# Patient Record
Sex: MALE | Race: WHITE | Employment: OTHER | URBAN - METROPOLITAN AREA
[De-identification: names, ages, dates, MRNs, and addresses within clinical notes are randomized per-mention and may not be internally consistent; named-entity substitution may affect disease eponyms.]

---

## 2017-01-01 ENCOUNTER — OFFICE VISIT (OUTPATIENT)
Dept: FAMILY MEDICINE | Facility: CLINIC | Age: 82
End: 2017-01-01
Payer: MEDICARE

## 2017-01-01 ENCOUNTER — DOCUMENTATION ONLY (OUTPATIENT)
Dept: FAMILY MEDICINE | Facility: CLINIC | Age: 82
End: 2017-01-01

## 2017-01-01 ENCOUNTER — TELEPHONE (OUTPATIENT)
Dept: FAMILY MEDICINE | Facility: CLINIC | Age: 82
End: 2017-01-01

## 2017-01-01 ENCOUNTER — NURSING HOME VISIT (OUTPATIENT)
Dept: GERIATRICS | Facility: CLINIC | Age: 82
End: 2017-01-01

## 2017-01-01 ENCOUNTER — APPOINTMENT (OUTPATIENT)
Dept: GENERAL RADIOLOGY | Facility: CLINIC | Age: 82
End: 2017-01-01
Attending: EMERGENCY MEDICINE
Payer: MEDICARE

## 2017-01-01 ENCOUNTER — DOCUMENTATION ONLY (OUTPATIENT)
Dept: CARE COORDINATION | Facility: CLINIC | Age: 82
End: 2017-01-01

## 2017-01-01 ENCOUNTER — OFFICE VISIT (OUTPATIENT)
Dept: BEHAVIORAL HEALTH | Facility: CLINIC | Age: 82
End: 2017-01-01
Payer: MEDICARE

## 2017-01-01 ENCOUNTER — TRANSFERRED RECORDS (OUTPATIENT)
Dept: HEALTH INFORMATION MANAGEMENT | Facility: CLINIC | Age: 82
End: 2017-01-01

## 2017-01-01 ENCOUNTER — OFFICE VISIT (OUTPATIENT)
Dept: PHARMACY | Facility: CLINIC | Age: 82
End: 2017-01-01
Payer: COMMERCIAL

## 2017-01-01 ENCOUNTER — OFFICE VISIT (OUTPATIENT)
Dept: BEHAVIORAL HEALTH | Facility: CLINIC | Age: 82
End: 2017-01-01
Payer: COMMERCIAL

## 2017-01-01 ENCOUNTER — OFFICE VISIT (OUTPATIENT)
Dept: FAMILY MEDICINE | Facility: CLINIC | Age: 82
End: 2017-01-01
Payer: COMMERCIAL

## 2017-01-01 ENCOUNTER — HOSPITAL LABORATORY (OUTPATIENT)
Dept: OTHER | Facility: CLINIC | Age: 82
End: 2017-01-01

## 2017-01-01 ENCOUNTER — MEDICAL CORRESPONDENCE (OUTPATIENT)
Dept: HEALTH INFORMATION MANAGEMENT | Facility: CLINIC | Age: 82
End: 2017-01-01

## 2017-01-01 ENCOUNTER — APPOINTMENT (OUTPATIENT)
Dept: GENERAL RADIOLOGY | Facility: CLINIC | Age: 82
End: 2017-01-01
Attending: PHYSICIAN ASSISTANT
Payer: MEDICARE

## 2017-01-01 ENCOUNTER — HOSPITAL ENCOUNTER (EMERGENCY)
Facility: CLINIC | Age: 82
Discharge: HOME | End: 2017-05-11
Attending: EMERGENCY MEDICINE | Admitting: EMERGENCY MEDICINE
Payer: MEDICARE

## 2017-01-01 ENCOUNTER — CARE COORDINATION (OUTPATIENT)
Dept: CARE COORDINATION | Facility: CLINIC | Age: 82
End: 2017-01-01

## 2017-01-01 ENCOUNTER — OFFICE VISIT (OUTPATIENT)
Dept: FAMILY MEDICINE | Facility: CLINIC | Age: 82
End: 2017-01-01

## 2017-01-01 ENCOUNTER — APPOINTMENT (OUTPATIENT)
Dept: PHYSICAL THERAPY | Facility: CLINIC | Age: 82
End: 2017-01-01
Attending: PHYSICIAN ASSISTANT
Payer: MEDICARE

## 2017-01-01 ENCOUNTER — DOCUMENTATION ONLY (OUTPATIENT)
Dept: OTHER | Facility: CLINIC | Age: 82
End: 2017-01-01

## 2017-01-01 ENCOUNTER — APPOINTMENT (OUTPATIENT)
Dept: MRI IMAGING | Facility: CLINIC | Age: 82
End: 2017-01-01
Attending: EMERGENCY MEDICINE
Payer: MEDICARE

## 2017-01-01 ENCOUNTER — DISCHARGE SUMMARY NURSING HOME (OUTPATIENT)
Dept: GERIATRICS | Facility: CLINIC | Age: 82
End: 2017-01-01
Payer: MEDICARE

## 2017-01-01 ENCOUNTER — NURSING HOME VISIT (OUTPATIENT)
Dept: GERIATRICS | Facility: CLINIC | Age: 82
End: 2017-01-01
Payer: MEDICARE

## 2017-01-01 ENCOUNTER — HOSPITAL ENCOUNTER (OUTPATIENT)
Facility: CLINIC | Age: 82
Setting detail: OBSERVATION
Discharge: HOME | End: 2017-05-04
Attending: EMERGENCY MEDICINE | Admitting: HOSPITALIST
Payer: MEDICARE

## 2017-01-01 ENCOUNTER — APPOINTMENT (OUTPATIENT)
Dept: CT IMAGING | Facility: CLINIC | Age: 82
End: 2017-01-01
Attending: EMERGENCY MEDICINE
Payer: MEDICARE

## 2017-01-01 VITALS — HEART RATE: 82 BPM | OXYGEN SATURATION: 98 %

## 2017-01-01 VITALS
RESPIRATION RATE: 20 BRPM | HEART RATE: 80 BPM | OXYGEN SATURATION: 94 % | SYSTOLIC BLOOD PRESSURE: 122 MMHG | DIASTOLIC BLOOD PRESSURE: 65 MMHG

## 2017-01-01 VITALS
RESPIRATION RATE: 21 BRPM | HEIGHT: 65 IN | SYSTOLIC BLOOD PRESSURE: 133 MMHG | HEART RATE: 97 BPM | OXYGEN SATURATION: 95 % | BODY MASS INDEX: 38.32 KG/M2 | DIASTOLIC BLOOD PRESSURE: 75 MMHG | WEIGHT: 230 LBS | TEMPERATURE: 98.1 F

## 2017-01-01 VITALS
WEIGHT: 213.4 LBS | RESPIRATION RATE: 20 BRPM | OXYGEN SATURATION: 96 % | BODY MASS INDEX: 35.56 KG/M2 | HEIGHT: 65 IN | SYSTOLIC BLOOD PRESSURE: 134 MMHG | DIASTOLIC BLOOD PRESSURE: 76 MMHG | HEART RATE: 79 BPM | TEMPERATURE: 98 F

## 2017-01-01 VITALS
BODY MASS INDEX: 37.82 KG/M2 | WEIGHT: 227 LBS | TEMPERATURE: 98.5 F | OXYGEN SATURATION: 95 % | DIASTOLIC BLOOD PRESSURE: 72 MMHG | HEART RATE: 91 BPM | RESPIRATION RATE: 21 BRPM | SYSTOLIC BLOOD PRESSURE: 133 MMHG | HEIGHT: 65 IN

## 2017-01-01 VITALS — HEART RATE: 77 BPM | OXYGEN SATURATION: 96 % | SYSTOLIC BLOOD PRESSURE: 130 MMHG | DIASTOLIC BLOOD PRESSURE: 70 MMHG

## 2017-01-01 VITALS
BODY MASS INDEX: 38.85 KG/M2 | TEMPERATURE: 98.2 F | SYSTOLIC BLOOD PRESSURE: 155 MMHG | WEIGHT: 233.2 LBS | OXYGEN SATURATION: 98 % | DIASTOLIC BLOOD PRESSURE: 71 MMHG | HEIGHT: 65 IN | RESPIRATION RATE: 21 BRPM | HEART RATE: 74 BPM

## 2017-01-01 VITALS
BODY MASS INDEX: 38.85 KG/M2 | TEMPERATURE: 97.4 F | HEIGHT: 65 IN | DIASTOLIC BLOOD PRESSURE: 74 MMHG | WEIGHT: 233.2 LBS | RESPIRATION RATE: 20 BRPM | SYSTOLIC BLOOD PRESSURE: 157 MMHG | HEART RATE: 76 BPM | OXYGEN SATURATION: 95 %

## 2017-01-01 VITALS
HEART RATE: 86 BPM | SYSTOLIC BLOOD PRESSURE: 142 MMHG | RESPIRATION RATE: 20 BRPM | DIASTOLIC BLOOD PRESSURE: 74 MMHG | TEMPERATURE: 98.3 F | OXYGEN SATURATION: 99 % | HEIGHT: 65 IN

## 2017-01-01 VITALS
DIASTOLIC BLOOD PRESSURE: 84 MMHG | TEMPERATURE: 97.9 F | RESPIRATION RATE: 20 BRPM | OXYGEN SATURATION: 98 % | HEART RATE: 87 BPM | WEIGHT: 230 LBS | BODY MASS INDEX: 38.32 KG/M2 | HEIGHT: 65 IN | SYSTOLIC BLOOD PRESSURE: 171 MMHG

## 2017-01-01 VITALS
OXYGEN SATURATION: 96 % | TEMPERATURE: 97.7 F | SYSTOLIC BLOOD PRESSURE: 112 MMHG | RESPIRATION RATE: 18 BRPM | DIASTOLIC BLOOD PRESSURE: 65 MMHG | HEART RATE: 66 BPM

## 2017-01-01 VITALS
DIASTOLIC BLOOD PRESSURE: 63 MMHG | OXYGEN SATURATION: 94 % | RESPIRATION RATE: 18 BRPM | HEART RATE: 73 BPM | SYSTOLIC BLOOD PRESSURE: 131 MMHG | TEMPERATURE: 97.1 F

## 2017-01-01 VITALS
DIASTOLIC BLOOD PRESSURE: 70 MMHG | TEMPERATURE: 98 F | WEIGHT: 229.94 LBS | OXYGEN SATURATION: 96 % | BODY MASS INDEX: 38.26 KG/M2 | RESPIRATION RATE: 20 BRPM | SYSTOLIC BLOOD PRESSURE: 132 MMHG

## 2017-01-01 VITALS
RESPIRATION RATE: 18 BRPM | OXYGEN SATURATION: 98 % | SYSTOLIC BLOOD PRESSURE: 140 MMHG | HEART RATE: 80 BPM | DIASTOLIC BLOOD PRESSURE: 60 MMHG

## 2017-01-01 VITALS
HEIGHT: 65 IN | DIASTOLIC BLOOD PRESSURE: 71 MMHG | HEART RATE: 78 BPM | OXYGEN SATURATION: 95 % | WEIGHT: 230 LBS | TEMPERATURE: 97.8 F | SYSTOLIC BLOOD PRESSURE: 159 MMHG | RESPIRATION RATE: 20 BRPM | BODY MASS INDEX: 38.32 KG/M2

## 2017-01-01 VITALS
OXYGEN SATURATION: 96 % | DIASTOLIC BLOOD PRESSURE: 64 MMHG | RESPIRATION RATE: 18 BRPM | SYSTOLIC BLOOD PRESSURE: 120 MMHG | HEART RATE: 74 BPM

## 2017-01-01 VITALS — OXYGEN SATURATION: 97 % | HEART RATE: 78 BPM | DIASTOLIC BLOOD PRESSURE: 78 MMHG | SYSTOLIC BLOOD PRESSURE: 138 MMHG

## 2017-01-01 DIAGNOSIS — R26.9 ABNORMALITY OF GAIT AND MOBILITY: ICD-10-CM

## 2017-01-01 DIAGNOSIS — R53.83 FATIGUE, UNSPECIFIED TYPE: ICD-10-CM

## 2017-01-01 DIAGNOSIS — J43.1 PANLOBULAR EMPHYSEMA (H): ICD-10-CM

## 2017-01-01 DIAGNOSIS — J44.9 CHRONIC OBSTRUCTIVE PULMONARY DISEASE, UNSPECIFIED COPD TYPE (H): ICD-10-CM

## 2017-01-01 DIAGNOSIS — S62.621S: ICD-10-CM

## 2017-01-01 DIAGNOSIS — R53.1 GENERALIZED WEAKNESS: ICD-10-CM

## 2017-01-01 DIAGNOSIS — I50.33 ACUTE ON CHRONIC DIASTOLIC CONGESTIVE HEART FAILURE (H): Primary | ICD-10-CM

## 2017-01-01 DIAGNOSIS — J43.9 EMPHYSEMA, UNSPECIFIED (H): ICD-10-CM

## 2017-01-01 DIAGNOSIS — K59.01 SLOW TRANSIT CONSTIPATION: ICD-10-CM

## 2017-01-01 DIAGNOSIS — Z71.89 ACP (ADVANCE CARE PLANNING): Chronic | ICD-10-CM
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
## 2017-01-01 DIAGNOSIS — S62.621A CLOSED DISPLACED FRACTURE OF MIDDLE PHALANX OF LEFT INDEX FINGER, INITIAL ENCOUNTER: ICD-10-CM

## 2017-01-01 DIAGNOSIS — F51.01 PRIMARY INSOMNIA: Primary | ICD-10-CM

## 2017-01-01 DIAGNOSIS — R52 GENERALIZED PAIN: ICD-10-CM

## 2017-01-01 DIAGNOSIS — F51.01 PRIMARY INSOMNIA: ICD-10-CM

## 2017-01-01 DIAGNOSIS — M79.645 PAIN OF FINGER OF LEFT HAND: Primary | ICD-10-CM

## 2017-01-01 DIAGNOSIS — N40.0 BENIGN PROSTATIC HYPERPLASIA, PRESENCE OF LOWER URINARY TRACT SYMPTOMS UNSPECIFIED, UNSPECIFIED MORPHOLOGY: ICD-10-CM

## 2017-01-01 DIAGNOSIS — M26.629 CHRONIC TMJ PAIN: ICD-10-CM

## 2017-01-01 DIAGNOSIS — F33.1 MAJOR DEPRESSIVE DISORDER, RECURRENT EPISODE, MODERATE (H): ICD-10-CM

## 2017-01-01 DIAGNOSIS — I50.32 CHRONIC DIASTOLIC CONGESTIVE HEART FAILURE (H): ICD-10-CM

## 2017-01-01 DIAGNOSIS — J44.1 COPD EXACERBATION (H): ICD-10-CM

## 2017-01-01 DIAGNOSIS — R60.0 BILATERAL EDEMA OF LOWER EXTREMITY: ICD-10-CM

## 2017-01-01 DIAGNOSIS — I50.33 ACUTE ON CHRONIC DIASTOLIC CONGESTIVE HEART FAILURE (H): ICD-10-CM

## 2017-01-01 DIAGNOSIS — R53.81 PHYSICAL DECONDITIONING: ICD-10-CM

## 2017-01-01 DIAGNOSIS — I10 ESSENTIAL HYPERTENSION: ICD-10-CM

## 2017-01-01 DIAGNOSIS — Z71.89 ADVANCED DIRECTIVES, COUNSELING/DISCUSSION: ICD-10-CM

## 2017-01-01 DIAGNOSIS — R60.0 BILATERAL EDEMA OF LOWER EXTREMITY: Primary | ICD-10-CM

## 2017-01-01 DIAGNOSIS — J44.1 OBSTRUCTIVE CHRONIC BRONCHITIS WITH EXACERBATION (H): ICD-10-CM

## 2017-01-01 DIAGNOSIS — M62.81 GENERALIZED MUSCLE WEAKNESS: ICD-10-CM

## 2017-01-01 DIAGNOSIS — L03.115 CELLULITIS OF RIGHT LOWER EXTREMITY: Primary | ICD-10-CM

## 2017-01-01 DIAGNOSIS — S09.90XA CLOSED HEAD INJURY, INITIAL ENCOUNTER: ICD-10-CM

## 2017-01-01 DIAGNOSIS — G89.29 CHRONIC TMJ PAIN: Primary | ICD-10-CM

## 2017-01-01 DIAGNOSIS — K21.9 GASTROESOPHAGEAL REFLUX DISEASE WITHOUT ESOPHAGITIS: ICD-10-CM

## 2017-01-01 DIAGNOSIS — I50.23 ACUTE ON CHRONIC SYSTOLIC CONGESTIVE HEART FAILURE (H): ICD-10-CM

## 2017-01-01 DIAGNOSIS — J44.1 OBSTRUCTIVE CHRONIC BRONCHITIS WITH EXACERBATION (H): Primary | ICD-10-CM

## 2017-01-01 DIAGNOSIS — R33.8 HYPERTROPHY OF PROSTATE WITH URINARY RETENTION: ICD-10-CM

## 2017-01-01 DIAGNOSIS — F33.1 MAJOR DEPRESSIVE DISORDER, RECURRENT EPISODE, MODERATE (H): Primary | ICD-10-CM

## 2017-01-01 DIAGNOSIS — B02.9 HERPES ZOSTER WITHOUT COMPLICATION: ICD-10-CM

## 2017-01-01 DIAGNOSIS — G47.01 INSOMNIA DUE TO MEDICAL CONDITION: ICD-10-CM

## 2017-01-01 DIAGNOSIS — I10 BENIGN ESSENTIAL HYPERTENSION: ICD-10-CM

## 2017-01-01 DIAGNOSIS — M25.512 ACUTE PAIN OF LEFT SHOULDER: ICD-10-CM

## 2017-01-01 DIAGNOSIS — S92.501A: ICD-10-CM

## 2017-01-01 DIAGNOSIS — I50.23 ACUTE ON CHRONIC SYSTOLIC CONGESTIVE HEART FAILURE (H): Primary | ICD-10-CM
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
## 2017-01-01 DIAGNOSIS — L03.115 CELLULITIS OF RIGHT LOWER EXTREMITY: ICD-10-CM

## 2017-01-01 DIAGNOSIS — Z63.8 CAREGIVER ROLE STRAIN: Primary | ICD-10-CM

## 2017-01-01 DIAGNOSIS — G89.29 CHRONIC TMJ PAIN: ICD-10-CM

## 2017-01-01 DIAGNOSIS — M25.561 ACUTE PAIN OF RIGHT KNEE: ICD-10-CM

## 2017-01-01 DIAGNOSIS — F33.1 DEPRESSION, MAJOR, RECURRENT, MODERATE (H): ICD-10-CM

## 2017-01-01 DIAGNOSIS — R05.9 COUGH: ICD-10-CM

## 2017-01-01 DIAGNOSIS — S91.311D FOOT LACERATION, RIGHT, SUBSEQUENT ENCOUNTER: ICD-10-CM

## 2017-01-01 DIAGNOSIS — N40.1 HYPERTROPHY OF PROSTATE WITH URINARY RETENTION: ICD-10-CM

## 2017-01-01 DIAGNOSIS — S62.621D CLOSED DISPLACED FRACTURE OF MIDDLE PHALANX OF LEFT INDEX FINGER WITH ROUTINE HEALING, SUBSEQUENT ENCOUNTER: Primary | ICD-10-CM

## 2017-01-01 DIAGNOSIS — L03.115 CELLULITIS OF RIGHT FOOT: ICD-10-CM

## 2017-01-01 DIAGNOSIS — K59.03 DRUG-INDUCED CONSTIPATION: ICD-10-CM

## 2017-01-01 DIAGNOSIS — S91.119D: ICD-10-CM

## 2017-01-01 DIAGNOSIS — S09.90XD CLOSED HEAD INJURY, SUBSEQUENT ENCOUNTER: ICD-10-CM

## 2017-01-01 DIAGNOSIS — R53.81 PHYSICAL DECONDITIONING: Primary | ICD-10-CM

## 2017-01-01 DIAGNOSIS — M62.838 MUSCLE SPASM: ICD-10-CM

## 2017-01-01 DIAGNOSIS — K59.00 CONSTIPATION, UNSPECIFIED CONSTIPATION TYPE: ICD-10-CM

## 2017-01-01 DIAGNOSIS — M79.674 PAIN OF TOE OF RIGHT FOOT: ICD-10-CM
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
## 2017-01-01 DIAGNOSIS — S91.114S: ICD-10-CM

## 2017-01-01 DIAGNOSIS — M26.629 CHRONIC TMJ PAIN: Primary | ICD-10-CM

## 2017-01-01 DIAGNOSIS — G89.29 OTHER CHRONIC PAIN: ICD-10-CM

## 2017-01-01 DIAGNOSIS — S01.511A LIP LACERATION, INITIAL ENCOUNTER: ICD-10-CM

## 2017-01-01 DIAGNOSIS — Z51.89 VISIT FOR WOUND CHECK: ICD-10-CM

## 2017-01-01 DIAGNOSIS — H92.02 PAIN OF MASTOID REGION, LEFT: ICD-10-CM

## 2017-01-01 DIAGNOSIS — M26.609 TMJ (TEMPOROMANDIBULAR JOINT SYNDROME): ICD-10-CM

## 2017-01-01 DIAGNOSIS — G62.9 SENSORY NEUROPATHY: ICD-10-CM

## 2017-01-01 DIAGNOSIS — Z51.5 PALLIATIVE CARE PATIENT: ICD-10-CM

## 2017-01-01 DIAGNOSIS — G25.81 RESTLESS LEGS SYNDROME (RLS): ICD-10-CM

## 2017-01-01 LAB
ALBUMIN SERPL-MCNC: 3.8 G/DL (ref 3.4–5)
ALBUMIN UR-MCNC: NEGATIVE MG/DL
ALP SERPL-CCNC: 88 U/L (ref 40–150)
ALT SERPL W P-5'-P-CCNC: 23 U/L (ref 0–70)
ANION GAP SERPL CALCULATED.3IONS-SCNC: 3 MMOL/L (ref 3–14)
ANION GAP SERPL CALCULATED.3IONS-SCNC: 5 MMOL/L (ref 3–14)
ANION GAP SERPL CALCULATED.3IONS-SCNC: 6 MMOL/L (ref 3–14)
ANION GAP SERPL CALCULATED.3IONS-SCNC: 7 MMOL/L (ref 3–14)
ANION GAP SERPL CALCULATED.3IONS-SCNC: 9 MMOL/L (ref 3–14)
APPEARANCE UR: CLEAR
AST SERPL W P-5'-P-CCNC: 27 U/L (ref 0–45)
BACTERIA SPEC CULT: NO GROWTH
BACTERIA SPEC CULT: NO GROWTH
BASOPHILS # BLD AUTO: 0 10E9/L (ref 0–0.2)
BASOPHILS NFR BLD AUTO: 0.2 %
BASOPHILS NFR BLD AUTO: 0.3 %
BASOPHILS NFR BLD AUTO: 0.4 %
BILIRUB SERPL-MCNC: 0.3 MG/DL (ref 0.2–1.3)
BILIRUB UR QL STRIP: NEGATIVE
BUN SERPL-MCNC: 14 MG/DL (ref 7–30)
BUN SERPL-MCNC: 16 MG/DL (ref 7–30)
BUN SERPL-MCNC: 17 MG/DL (ref 7–30)
BUN SERPL-MCNC: 20 MG/DL (ref 7–30)
BUN SERPL-MCNC: 21 MG/DL (ref 7–30)
BUN SERPL-MCNC: 22 MG/DL (ref 7–30)
BUN SERPL-MCNC: 25 MG/DL (ref 7–30)
CALCIUM SERPL-MCNC: 8.8 MG/DL (ref 8.5–10.1)
CALCIUM SERPL-MCNC: 8.9 MG/DL (ref 8.5–10.1)
CALCIUM SERPL-MCNC: 9 MG/DL (ref 8.5–10.1)
CALCIUM SERPL-MCNC: 9 MG/DL (ref 8.5–10.1)
CALCIUM SERPL-MCNC: 9.1 MG/DL (ref 8.5–10.1)
CALCIUM SERPL-MCNC: 9.2 MG/DL (ref 8.5–10.1)
CALCIUM SERPL-MCNC: 9.3 MG/DL (ref 8.5–10.1)
CHLORIDE SERPL-SCNC: 101 MMOL/L (ref 94–109)
CHLORIDE SERPL-SCNC: 102 MMOL/L (ref 94–109)
CHLORIDE SERPL-SCNC: 97 MMOL/L (ref 94–109)
CHLORIDE SERPL-SCNC: 98 MMOL/L (ref 94–109)
CHLORIDE SERPL-SCNC: 99 MMOL/L (ref 94–109)
CO2 SERPL-SCNC: 28 MMOL/L (ref 20–32)
CO2 SERPL-SCNC: 32 MMOL/L (ref 20–32)
CO2 SERPL-SCNC: 33 MMOL/L (ref 20–32)
CO2 SERPL-SCNC: 34 MMOL/L (ref 20–32)
CO2 SERPL-SCNC: 34 MMOL/L (ref 20–32)
CO2 SERPL-SCNC: 35 MMOL/L (ref 20–32)
CO2 SERPL-SCNC: 36 MMOL/L (ref 20–32)
COLOR UR AUTO: YELLOW
CREAT SERPL-MCNC: 0.72 MG/DL (ref 0.66–1.25)
CREAT SERPL-MCNC: 0.79 MG/DL (ref 0.66–1.25)
CREAT SERPL-MCNC: 0.82 MG/DL (ref 0.66–1.25)
CREAT SERPL-MCNC: 0.85 MG/DL (ref 0.66–1.25)
CREAT SERPL-MCNC: 0.86 MG/DL (ref 0.66–1.25)
CREAT SERPL-MCNC: 0.89 MG/DL (ref 0.66–1.25)
CREAT SERPL-MCNC: 0.98 MG/DL (ref 0.66–1.25)
DIFFERENTIAL METHOD BLD: ABNORMAL
EOSINOPHIL # BLD AUTO: 0.2 10E9/L (ref 0–0.7)
EOSINOPHIL NFR BLD AUTO: 1.5 %
EOSINOPHIL NFR BLD AUTO: 2.4 %
EOSINOPHIL NFR BLD AUTO: 3.1 %
ERYTHROCYTE [DISTWIDTH] IN BLOOD BY AUTOMATED COUNT: 14.1 % (ref 10–15)
ERYTHROCYTE [DISTWIDTH] IN BLOOD BY AUTOMATED COUNT: 14.2 % (ref 10–15)
ERYTHROCYTE [DISTWIDTH] IN BLOOD BY AUTOMATED COUNT: 14.4 % (ref 10–15)
ERYTHROCYTE [DISTWIDTH] IN BLOOD BY AUTOMATED COUNT: 14.4 % (ref 10–15)
ERYTHROCYTE [DISTWIDTH] IN BLOOD BY AUTOMATED COUNT: 15 % (ref 10–15)
GFR SERPL CREATININE-BSD FRML MDRD: 73 ML/MIN/1.7M2
GFR SERPL CREATININE-BSD FRML MDRD: 82 ML/MIN/1.7M2
GFR SERPL CREATININE-BSD FRML MDRD: 84 ML/MIN/1.7M2
GFR SERPL CREATININE-BSD FRML MDRD: 85 ML/MIN/1.7M2
GFR SERPL CREATININE-BSD FRML MDRD: 90 ML/MIN/1.7M2
GFR SERPL CREATININE-BSD FRML MDRD: ABNORMAL ML/MIN/1.7M2
GFR SERPL CREATININE-BSD FRML MDRD: NORMAL ML/MIN/1.7M2
GLUCOSE SERPL-MCNC: 107 MG/DL (ref 70–99)
GLUCOSE SERPL-MCNC: 108 MG/DL (ref 70–99)
GLUCOSE SERPL-MCNC: 110 MG/DL (ref 70–99)
GLUCOSE SERPL-MCNC: 124 MG/DL (ref 70–99)
GLUCOSE SERPL-MCNC: 129 MG/DL (ref 70–99)
GLUCOSE SERPL-MCNC: 173 MG/DL (ref 70–99)
GLUCOSE SERPL-MCNC: 87 MG/DL (ref 70–99)
GLUCOSE UR STRIP-MCNC: NEGATIVE MG/DL
HCT VFR BLD AUTO: 34.3 % (ref 40–53)
HCT VFR BLD AUTO: 34.5 % (ref 40–53)
HCT VFR BLD AUTO: 35.1 % (ref 40–53)
HCT VFR BLD AUTO: 37.9 % (ref 40–53)
HCT VFR BLD AUTO: 47.5 % (ref 40–53)
HGB BLD-MCNC: 10.8 G/DL (ref 13.3–17.7)
HGB BLD-MCNC: 11.8 G/DL (ref 13.3–17.7)
HGB BLD-MCNC: 14.6 G/DL (ref 13.3–17.7)
HGB UR QL STRIP: NEGATIVE
HYALINE CASTS #/AREA URNS LPF: 2 /LPF (ref 0–2)
IMM GRANULOCYTES # BLD: 0.1 10E9/L (ref 0–0.4)
IMM GRANULOCYTES # BLD: 0.2 10E9/L (ref 0–0.4)
IMM GRANULOCYTES # BLD: 0.2 10E9/L (ref 0–0.4)
IMM GRANULOCYTES NFR BLD: 1.4 %
IMM GRANULOCYTES NFR BLD: 1.5 %
IMM GRANULOCYTES NFR BLD: 2 %
INR PPP: 0.88 (ref 0.86–1.14)
INTERPRETATION ECG - MUSE: NORMAL
KETONES UR STRIP-MCNC: NEGATIVE MG/DL
LACTATE BLD-SCNC: 1.6 MMOL/L (ref 0.7–2.1)
LACTATE BLD-SCNC: 1.7 MMOL/L (ref 0.7–2.1)
LEUKOCYTE ESTERASE UR QL STRIP: ABNORMAL
LIPASE SERPL-CCNC: 89 U/L (ref 73–393)
LYMPHOCYTES # BLD AUTO: 0.6 10E9/L (ref 0.8–5.3)
LYMPHOCYTES # BLD AUTO: 0.9 10E9/L (ref 0.8–5.3)
LYMPHOCYTES # BLD AUTO: 1 10E9/L (ref 0.8–5.3)
LYMPHOCYTES NFR BLD AUTO: 11.4 %
LYMPHOCYTES NFR BLD AUTO: 16.3 %
LYMPHOCYTES NFR BLD AUTO: 4.4 %
Lab: NORMAL
Lab: NORMAL
MCH RBC QN AUTO: 28.3 PG (ref 26.5–33)
MCH RBC QN AUTO: 29 PG (ref 26.5–33)
MCH RBC QN AUTO: 29.1 PG (ref 26.5–33)
MCH RBC QN AUTO: 29.2 PG (ref 26.5–33)
MCH RBC QN AUTO: 29.4 PG (ref 26.5–33)
MCHC RBC AUTO-ENTMCNC: 30.7 G/DL (ref 31.5–36.5)
MCHC RBC AUTO-ENTMCNC: 30.8 G/DL (ref 31.5–36.5)
MCHC RBC AUTO-ENTMCNC: 31.1 G/DL (ref 31.5–36.5)
MCHC RBC AUTO-ENTMCNC: 31.3 G/DL (ref 31.5–36.5)
MCHC RBC AUTO-ENTMCNC: 31.5 G/DL (ref 31.5–36.5)
MCV RBC AUTO: 92 FL (ref 78–100)
MCV RBC AUTO: 93 FL (ref 78–100)
MCV RBC AUTO: 94 FL (ref 78–100)
MICRO REPORT STATUS: NORMAL
MICRO REPORT STATUS: NORMAL
MONOCYTES # BLD AUTO: 0.5 10E9/L (ref 0–1.3)
MONOCYTES # BLD AUTO: 0.7 10E9/L (ref 0–1.3)
MONOCYTES # BLD AUTO: 0.9 10E9/L (ref 0–1.3)
MONOCYTES NFR BLD AUTO: 6.6 %
MONOCYTES NFR BLD AUTO: 8.5 %
MONOCYTES NFR BLD AUTO: 9.6 %
NEUTROPHILS # BLD AUTO: 11.8 10E9/L (ref 1.6–8.3)
NEUTROPHILS # BLD AUTO: 3.8 10E9/L (ref 1.6–8.3)
NEUTROPHILS # BLD AUTO: 6.6 10E9/L (ref 1.6–8.3)
NEUTROPHILS NFR BLD AUTO: 69.2 %
NEUTROPHILS NFR BLD AUTO: 75.4 %
NEUTROPHILS NFR BLD AUTO: 85.8 %
NITRATE UR QL: NEGATIVE
NRBC # BLD AUTO: 0 10*3/UL
NRBC BLD AUTO-RTO: 0 /100
PH UR STRIP: 5 PH (ref 5–7)
PLATELET # BLD AUTO: 231 10E9/L (ref 150–450)
PLATELET # BLD AUTO: 291 10E9/L (ref 150–450)
PLATELET # BLD AUTO: 369 10E9/L (ref 150–450)
PLATELET # BLD AUTO: 395 10E9/L (ref 150–450)
PLATELET # BLD AUTO: 414 10E9/L (ref 150–450)
POTASSIUM SERPL-SCNC: 4 MMOL/L (ref 3.4–5.3)
POTASSIUM SERPL-SCNC: 4.2 MMOL/L (ref 3.4–5.3)
POTASSIUM SERPL-SCNC: 4.3 MMOL/L (ref 3.4–5.3)
PROT SERPL-MCNC: 7.4 G/DL (ref 6.8–8.8)
RBC # BLD AUTO: 3.67 10E12/L (ref 4.4–5.9)
RBC # BLD AUTO: 3.7 10E12/L (ref 4.4–5.9)
RBC # BLD AUTO: 3.73 10E12/L (ref 4.4–5.9)
RBC # BLD AUTO: 4.05 10E12/L (ref 4.4–5.9)
RBC # BLD AUTO: 5.15 10E12/L (ref 4.4–5.9)
RBC #/AREA URNS AUTO: <1 /HPF (ref 0–2)
SODIUM SERPL-SCNC: 137 MMOL/L (ref 133–144)
SODIUM SERPL-SCNC: 137 MMOL/L (ref 133–144)
SODIUM SERPL-SCNC: 138 MMOL/L (ref 133–144)
SODIUM SERPL-SCNC: 139 MMOL/L (ref 133–144)
SODIUM SERPL-SCNC: 140 MMOL/L (ref 133–144)
SP GR UR STRIP: 1.02 (ref 1–1.03)
SPECIMEN SOURCE: NORMAL
SPECIMEN SOURCE: NORMAL
SQUAMOUS #/AREA URNS AUTO: <1 /HPF (ref 0–1)
TROPONIN I BLD-MCNC: 0.01 UG/L (ref 0–0.1)
URN SPEC COLLECT METH UR: ABNORMAL
UROBILINOGEN UR STRIP-MCNC: 0 MG/DL (ref 0–2)
WBC # BLD AUTO: 11.3 10E9/L (ref 4–11)
WBC # BLD AUTO: 13.7 10E9/L (ref 4–11)
WBC # BLD AUTO: 5.5 10E9/L (ref 4–11)
WBC # BLD AUTO: 8.7 10E9/L (ref 4–11)
WBC # BLD AUTO: 9.9 10E9/L (ref 4–11)
WBC #/AREA URNS AUTO: 6 /HPF (ref 0–2)

## 2017-01-01 PROCEDURE — 71020 XR CHEST 2 VW: CPT | Mod: XU

## 2017-01-01 PROCEDURE — 25000125 ZZHC RX 250: Performed by: PHYSICIAN ASSISTANT

## 2017-01-01 PROCEDURE — 83605 ASSAY OF LACTIC ACID: CPT | Performed by: EMERGENCY MEDICINE

## 2017-01-01 PROCEDURE — 85610 PROTHROMBIN TIME: CPT | Performed by: EMERGENCY MEDICINE

## 2017-01-01 PROCEDURE — 96375 TX/PRO/DX INJ NEW DRUG ADDON: CPT

## 2017-01-01 PROCEDURE — 99349 HOME/RES VST EST MOD MDM 40: CPT | Mod: GW | Performed by: NURSE PRACTITIONER

## 2017-01-01 PROCEDURE — G0378 HOSPITAL OBSERVATION PER HR: HCPCS

## 2017-01-01 PROCEDURE — 99350 HOME/RES VST EST HIGH MDM 60: CPT | Performed by: NURSE PRACTITIONER

## 2017-01-01 PROCEDURE — 25000132 ZZH RX MED GY IP 250 OP 250 PS 637: Performed by: PHYSICIAN ASSISTANT

## 2017-01-01 PROCEDURE — A9585 GADOBUTROL INJECTION: HCPCS | Performed by: RADIOLOGY

## 2017-01-01 PROCEDURE — 25000128 H RX IP 250 OP 636: Performed by: EMERGENCY MEDICINE

## 2017-01-01 PROCEDURE — 80048 BASIC METABOLIC PNL TOTAL CA: CPT | Performed by: EMERGENCY MEDICINE

## 2017-01-01 PROCEDURE — 90834 PSYTX W PT 45 MINUTES: CPT | Mod: GW | Performed by: SOCIAL WORKER

## 2017-01-01 PROCEDURE — 12001 RPR S/N/AX/GEN/TRNK 2.5CM/<: CPT

## 2017-01-01 PROCEDURE — 90834 PSYTX W PT 45 MINUTES: CPT | Performed by: SOCIAL WORKER

## 2017-01-01 PROCEDURE — 70450 CT HEAD/BRAIN W/O DYE: CPT

## 2017-01-01 PROCEDURE — 99354 ZZC PROLONGED SERV,OFFICE,1ST HR: CPT | Performed by: NURSE PRACTITIONER

## 2017-01-01 PROCEDURE — 99310 SBSQ NF CARE HIGH MDM 45: CPT | Mod: GW | Performed by: NURSE PRACTITIONER

## 2017-01-01 PROCEDURE — 96365 THER/PROPH/DIAG IV INF INIT: CPT | Mod: 59

## 2017-01-01 PROCEDURE — 99207 ZZC CDG-CORRECTLY CODED, REVIEWED AND AGREE: CPT | Performed by: NURSE PRACTITIONER

## 2017-01-01 PROCEDURE — 99285 EMERGENCY DEPT VISIT HI MDM: CPT | Mod: 25

## 2017-01-01 PROCEDURE — 40000275 ZZH STATISTIC RCP TIME EA 10 MIN

## 2017-01-01 PROCEDURE — 99316 NF DSCHRG MGMT 30 MIN+: CPT | Mod: GW | Performed by: NURSE PRACTITIONER

## 2017-01-01 PROCEDURE — 73130 X-RAY EXAM OF HAND: CPT | Mod: LT

## 2017-01-01 PROCEDURE — 94640 AIRWAY INHALATION TREATMENT: CPT

## 2017-01-01 PROCEDURE — 81001 URINALYSIS AUTO W/SCOPE: CPT | Performed by: PHYSICIAN ASSISTANT

## 2017-01-01 PROCEDURE — 85025 COMPLETE CBC W/AUTO DIFF WBC: CPT | Performed by: EMERGENCY MEDICINE

## 2017-01-01 PROCEDURE — 72125 CT NECK SPINE W/O DYE: CPT

## 2017-01-01 PROCEDURE — 80048 BASIC METABOLIC PNL TOTAL CA: CPT

## 2017-01-01 PROCEDURE — 80053 COMPREHEN METABOLIC PANEL: CPT | Performed by: EMERGENCY MEDICINE

## 2017-01-01 PROCEDURE — 73630 X-RAY EXAM OF FOOT: CPT | Mod: RT

## 2017-01-01 PROCEDURE — 93005 ELECTROCARDIOGRAM TRACING: CPT

## 2017-01-01 PROCEDURE — 27210509 ZZH TRAY VALVED DOUBLE LUMEN

## 2017-01-01 PROCEDURE — 73720 MRI LWR EXTREMITY W/O&W/DYE: CPT | Mod: RT

## 2017-01-01 PROCEDURE — 99497 ADVNCD CARE PLAN 30 MIN: CPT | Performed by: NURSE PRACTITIONER

## 2017-01-01 PROCEDURE — 99350 HOME/RES VST EST HIGH MDM 60: CPT | Mod: GW | Performed by: NURSE PRACTITIONER

## 2017-01-01 PROCEDURE — 99219 ZZC INITIAL OBSERVATION CARE,LEVL II: CPT | Mod: GW | Performed by: PHYSICIAN ASSISTANT

## 2017-01-01 PROCEDURE — 25500064 ZZH RX 255 OP 636: Performed by: RADIOLOGY

## 2017-01-01 PROCEDURE — 36415 COLL VENOUS BLD VENIPUNCTURE: CPT

## 2017-01-01 PROCEDURE — 29130 APPL FINGER SPLINT STATIC: CPT | Mod: F2

## 2017-01-01 PROCEDURE — 99607 MTMS BY PHARM ADDL 15 MIN: CPT | Performed by: PHARMACIST

## 2017-01-01 PROCEDURE — 99217 ZZC OBSERVATION CARE DISCHARGE: CPT | Mod: GW | Performed by: PHYSICIAN ASSISTANT

## 2017-01-01 PROCEDURE — 99309 SBSQ NF CARE MODERATE MDM 30: CPT | Mod: GV | Performed by: NURSE PRACTITIONER

## 2017-01-01 PROCEDURE — 99606 MTMS BY PHARM EST 15 MIN: CPT | Performed by: PHARMACIST

## 2017-01-01 PROCEDURE — 73562 X-RAY EXAM OF KNEE 3: CPT | Mod: RT

## 2017-01-01 PROCEDURE — 99207 ZZC CDG-CORRECTLY CODED, REVIEWED AND AGREE: CPT | Performed by: INTERNAL MEDICINE

## 2017-01-01 PROCEDURE — 72170 X-RAY EXAM OF PELVIS: CPT

## 2017-01-01 PROCEDURE — 99309 SBSQ NF CARE MODERATE MDM 30: CPT | Mod: GW | Performed by: NURSE PRACTITIONER

## 2017-01-01 PROCEDURE — 99306 1ST NF CARE HIGH MDM 50: CPT | Mod: GV | Performed by: INTERNAL MEDICINE

## 2017-01-01 PROCEDURE — 97161 PT EVAL LOW COMPLEX 20 MIN: CPT | Mod: GP | Performed by: PHYSICAL THERAPIST

## 2017-01-01 PROCEDURE — 73552 X-RAY EXAM OF FEMUR 2/>: CPT | Mod: RT

## 2017-01-01 PROCEDURE — 94640 AIRWAY INHALATION TREATMENT: CPT | Mod: 76

## 2017-01-01 PROCEDURE — 99605 MTMS BY PHARM NP 15 MIN: CPT | Performed by: PHARMACIST

## 2017-01-01 PROCEDURE — 87040 BLOOD CULTURE FOR BACTERIA: CPT | Performed by: EMERGENCY MEDICINE

## 2017-01-01 PROCEDURE — 96361 HYDRATE IV INFUSION ADD-ON: CPT | Mod: 59

## 2017-01-01 PROCEDURE — 99350 HOME/RES VST EST HIGH MDM 60: CPT | Mod: GV | Performed by: NURSE PRACTITIONER

## 2017-01-01 PROCEDURE — 84484 ASSAY OF TROPONIN QUANT: CPT

## 2017-01-01 PROCEDURE — 36569 INSJ PICC 5 YR+ W/O IMAGING: CPT

## 2017-01-01 PROCEDURE — 96374 THER/PROPH/DIAG INJ IV PUSH: CPT

## 2017-01-01 PROCEDURE — 40000193 ZZH STATISTIC PT WARD VISIT: Performed by: PHYSICAL THERAPIST

## 2017-01-01 PROCEDURE — 83690 ASSAY OF LIPASE: CPT | Performed by: EMERGENCY MEDICINE

## 2017-01-01 PROCEDURE — 36415 COLL VENOUS BLD VENIPUNCTURE: CPT | Performed by: EMERGENCY MEDICINE

## 2017-01-01 RX ORDER — BISACODYL 10 MG
10 SUPPOSITORY, RECTAL RECTAL 2 TIMES DAILY PRN
Status: DISCONTINUED | OUTPATIENT
Start: 2017-01-01 | End: 2017-01-01 | Stop reason: HOSPADM

## 2017-01-01 RX ORDER — ONDANSETRON 2 MG/ML
4 INJECTION INTRAMUSCULAR; INTRAVENOUS EVERY 30 MIN PRN
Status: DISCONTINUED | OUTPATIENT
Start: 2017-01-01 | End: 2017-01-01

## 2017-01-01 RX ORDER — AMOXICILLIN 250 MG
1-2 CAPSULE ORAL 2 TIMES DAILY PRN
Status: DISCONTINUED | OUTPATIENT
Start: 2017-01-01 | End: 2017-01-01 | Stop reason: HOSPADM

## 2017-01-01 RX ORDER — HALOPERIDOL 0.5 MG/1
.5-1 TABLET ORAL EVERY 6 HOURS PRN
Status: ON HOLD | COMMUNITY
End: 2017-01-01

## 2017-01-01 RX ORDER — TAMSULOSIN HYDROCHLORIDE 0.4 MG/1
0.4 CAPSULE ORAL DAILY
Status: DISCONTINUED | OUTPATIENT
Start: 2017-01-01 | End: 2017-01-01 | Stop reason: HOSPADM

## 2017-01-01 RX ORDER — PREDNISONE 10 MG/1
10 TABLET ORAL DAILY
COMMUNITY

## 2017-01-01 RX ORDER — GADOBUTROL 604.72 MG/ML
10 INJECTION INTRAVENOUS ONCE
Status: DISCONTINUED | OUTPATIENT
Start: 2017-01-01 | End: 2017-01-01 | Stop reason: CLARIF

## 2017-01-01 RX ORDER — NALOXONE HYDROCHLORIDE 0.4 MG/ML
.1-.4 INJECTION, SOLUTION INTRAMUSCULAR; INTRAVENOUS; SUBCUTANEOUS
Status: DISCONTINUED | OUTPATIENT
Start: 2017-01-01 | End: 2017-01-01 | Stop reason: HOSPADM

## 2017-01-01 RX ORDER — ACETAMINOPHEN 500 MG
1000 TABLET ORAL
Qty: 240 TABLET | Refills: 0 | COMMUNITY
Start: 2017-01-01

## 2017-01-01 RX ORDER — TORSEMIDE 10 MG/1
10 TABLET ORAL DAILY
Qty: 30 TABLET | Refills: 1 | Status: SHIPPED | OUTPATIENT
Start: 2017-01-01 | End: 2017-01-01

## 2017-01-01 RX ORDER — DIPHENHYDRAMINE HCL 25 MG
1 CAPSULE ORAL
COMMUNITY

## 2017-01-01 RX ORDER — LIDOCAINE 50 MG/G
OINTMENT TOPICAL 3 TIMES DAILY PRN
Qty: 50 G | Refills: 1 | COMMUNITY
Start: 2017-01-01

## 2017-01-01 RX ORDER — GABAPENTIN 300 MG/1
CAPSULE ORAL
Qty: 120 CAPSULE | Refills: 0 | OUTPATIENT
Start: 2017-01-01

## 2017-01-01 RX ORDER — HALOPERIDOL 0.5 MG/1
.5-1 TABLET ORAL EVERY 6 HOURS PRN
Status: DISCONTINUED | OUTPATIENT
Start: 2017-01-01 | End: 2017-01-01 | Stop reason: HOSPADM

## 2017-01-01 RX ORDER — TRAZODONE HYDROCHLORIDE 50 MG/1
TABLET, FILM COATED ORAL
Qty: 90 TABLET | Refills: 1 | Status: SHIPPED | OUTPATIENT
Start: 2017-01-01 | End: 2017-01-01

## 2017-01-01 RX ORDER — OXYCODONE HYDROCHLORIDE 5 MG/1
2.5-5 TABLET ORAL
Qty: 20 TABLET | Refills: 0 | Status: SHIPPED | OUTPATIENT
Start: 2017-01-01

## 2017-01-01 RX ORDER — FORMOTEROL FUMARATE DIHYDRATE 20 UG/2ML
20 SOLUTION RESPIRATORY (INHALATION) EVERY 12 HOURS
Qty: 2 ML | Refills: 1 | Status: SHIPPED | OUTPATIENT
Start: 2017-01-01 | End: 2017-01-01

## 2017-01-01 RX ORDER — AMOXICILLIN 250 MG
1 CAPSULE ORAL 2 TIMES DAILY
Qty: 100 TABLET | Refills: 3 | COMMUNITY
Start: 2017-01-01 | End: 2017-01-01

## 2017-01-01 RX ORDER — ONDANSETRON 2 MG/ML
4 INJECTION INTRAMUSCULAR; INTRAVENOUS EVERY 6 HOURS PRN
Status: DISCONTINUED | OUTPATIENT
Start: 2017-01-01 | End: 2017-01-01 | Stop reason: HOSPADM

## 2017-01-01 RX ORDER — LORAZEPAM 0.5 MG/1
.25-.5 TABLET ORAL EVERY 4 HOURS PRN
Status: DISCONTINUED | OUTPATIENT
Start: 2017-01-01 | End: 2017-01-01 | Stop reason: HOSPADM

## 2017-01-01 RX ORDER — METHADONE HYDROCHLORIDE 5 MG/1
2.5 TABLET ORAL EVERY 12 HOURS
Status: ON HOLD | COMMUNITY
End: 2017-01-01

## 2017-01-01 RX ORDER — GABAPENTIN 300 MG/1
600 CAPSULE ORAL 2 TIMES DAILY
Status: DISCONTINUED | OUTPATIENT
Start: 2017-01-01 | End: 2017-01-01 | Stop reason: HOSPADM

## 2017-01-01 RX ORDER — DULOXETIN HYDROCHLORIDE 30 MG/1
60 CAPSULE, DELAYED RELEASE ORAL DAILY
Status: DISCONTINUED | OUTPATIENT
Start: 2017-01-01 | End: 2017-01-01 | Stop reason: HOSPADM

## 2017-01-01 RX ORDER — SIMETHICONE 80 MG
160 TABLET,CHEWABLE ORAL 2 TIMES DAILY
Status: DISCONTINUED | OUTPATIENT
Start: 2017-01-01 | End: 2017-01-01 | Stop reason: HOSPADM

## 2017-01-01 RX ORDER — MONTELUKAST SODIUM 10 MG/1
10 TABLET ORAL AT BEDTIME
Status: DISCONTINUED | OUTPATIENT
Start: 2017-01-01 | End: 2017-01-01 | Stop reason: HOSPADM

## 2017-01-01 RX ORDER — LIDOCAINE HYDROCHLORIDE 10 MG/ML
INJECTION, SOLUTION EPIDURAL; INFILTRATION; INTRACAUDAL; PERINEURAL
Status: DISCONTINUED
Start: 2017-01-01 | End: 2017-01-01 | Stop reason: HOSPADM

## 2017-01-01 RX ORDER — TAMSULOSIN HYDROCHLORIDE 0.4 MG/1
0.4 CAPSULE ORAL DAILY
Qty: 90 CAPSULE | Refills: 0 | Status: SHIPPED | OUTPATIENT
Start: 2017-01-01

## 2017-01-01 RX ORDER — BUDESONIDE 0.5 MG/2ML
0.5 INHALANT ORAL 2 TIMES DAILY
Status: DISCONTINUED | OUTPATIENT
Start: 2017-01-01 | End: 2017-01-01 | Stop reason: HOSPADM

## 2017-01-01 RX ORDER — MORPHINE SULFATE 20 MG/ML
2.5-5 SOLUTION ORAL
Status: ON HOLD | COMMUNITY
End: 2017-01-01

## 2017-01-01 RX ORDER — LIDOCAINE 40 MG/G
CREAM TOPICAL
Status: DISCONTINUED | OUTPATIENT
Start: 2017-01-01 | End: 2017-01-01

## 2017-01-01 RX ORDER — TIOTROPIUM BROMIDE 18 UG/1
CAPSULE ORAL; RESPIRATORY (INHALATION)
Qty: 30 CAPSULE | Refills: 1 | Status: SHIPPED | OUTPATIENT
Start: 2017-01-01 | End: 2017-01-01

## 2017-01-01 RX ORDER — DULOXETIN HYDROCHLORIDE 60 MG/1
CAPSULE, DELAYED RELEASE ORAL
Qty: 90 CAPSULE | Refills: 0 | Status: SHIPPED | OUTPATIENT
Start: 2017-01-01

## 2017-01-01 RX ORDER — TORSEMIDE 10 MG/1
10 TABLET ORAL DAILY
Status: DISCONTINUED | OUTPATIENT
Start: 2017-01-01 | End: 2017-01-01 | Stop reason: HOSPADM

## 2017-01-01 RX ORDER — POLYETHYLENE GLYCOL 3350 17 G/17G
17 POWDER, FOR SOLUTION ORAL DAILY PRN
COMMUNITY

## 2017-01-01 RX ORDER — FORMOTEROL FUMARATE DIHYDRATE 20 UG/2ML
20 SOLUTION RESPIRATORY (INHALATION) EVERY 12 HOURS
Qty: 2 ML | Refills: 1 | COMMUNITY
Start: 2017-01-01 | End: 2017-01-01

## 2017-01-01 RX ORDER — PREDNISONE 10 MG/1
10 TABLET ORAL DAILY
Status: DISCONTINUED | OUTPATIENT
Start: 2017-01-01 | End: 2017-01-01 | Stop reason: HOSPADM

## 2017-01-01 RX ORDER — BUPROPION HYDROCHLORIDE 150 MG/1
150 TABLET ORAL DAILY
Status: DISCONTINUED | OUTPATIENT
Start: 2017-01-01 | End: 2017-01-01 | Stop reason: HOSPADM

## 2017-01-01 RX ORDER — LIDOCAINE 40 MG/G
CREAM TOPICAL
Status: DISCONTINUED | OUTPATIENT
Start: 2017-01-01 | End: 2017-01-01 | Stop reason: HOSPADM

## 2017-01-01 RX ORDER — POLYETHYLENE GLYCOL 3350 17 G/17G
17 POWDER, FOR SOLUTION ORAL DAILY PRN
Status: DISCONTINUED | OUTPATIENT
Start: 2017-01-01 | End: 2017-01-01 | Stop reason: HOSPADM

## 2017-01-01 RX ORDER — SIMETHICONE 180 MG
180 CAPSULE ORAL 2 TIMES DAILY
COMMUNITY

## 2017-01-01 RX ORDER — SENNOSIDES 8.6 MG
2 TABLET ORAL 2 TIMES DAILY
COMMUNITY
End: 2017-01-01

## 2017-01-01 RX ORDER — GABAPENTIN 300 MG/1
CAPSULE ORAL
Qty: 120 CAPSULE | Refills: 0 | Status: SHIPPED | OUTPATIENT
Start: 2017-01-01

## 2017-01-01 RX ORDER — TRAMADOL HYDROCHLORIDE 50 MG/1
25 TABLET ORAL 3 TIMES DAILY PRN
Qty: 90 TABLET | Refills: 0 | Status: SHIPPED | OUTPATIENT
Start: 2017-01-01 | End: 2017-01-01

## 2017-01-01 RX ORDER — POLYETHYLENE GLYCOL 3350 17 G/17G
17 POWDER, FOR SOLUTION ORAL DAILY PRN
Status: DISCONTINUED | OUTPATIENT
Start: 2017-01-01 | End: 2017-01-01

## 2017-01-01 RX ORDER — AMOXICILLIN 250 MG
2 CAPSULE ORAL 2 TIMES DAILY
Status: DISCONTINUED | OUTPATIENT
Start: 2017-01-01 | End: 2017-01-01 | Stop reason: HOSPADM

## 2017-01-01 RX ORDER — ACETAMINOPHEN 325 MG/1
650 TABLET ORAL EVERY 4 HOURS PRN
Status: DISCONTINUED | OUTPATIENT
Start: 2017-01-01 | End: 2017-01-01 | Stop reason: HOSPADM

## 2017-01-01 RX ORDER — GADOBUTROL 604.72 MG/ML
10 INJECTION INTRAVENOUS ONCE
Status: COMPLETED | OUTPATIENT
Start: 2017-01-01 | End: 2017-01-01

## 2017-01-01 RX ORDER — IPRATROPIUM BROMIDE AND ALBUTEROL SULFATE 2.5; .5 MG/3ML; MG/3ML
1 SOLUTION RESPIRATORY (INHALATION)
COMMUNITY

## 2017-01-01 RX ORDER — IPRATROPIUM BROMIDE AND ALBUTEROL SULFATE 2.5; .5 MG/3ML; MG/3ML
1 SOLUTION RESPIRATORY (INHALATION) 4 TIMES DAILY
Status: DISCONTINUED | OUTPATIENT
Start: 2017-01-01 | End: 2017-01-01 | Stop reason: HOSPADM

## 2017-01-01 RX ORDER — TORSEMIDE 10 MG/1
20 TABLET ORAL DAILY
Qty: 30 TABLET | Refills: 1 | COMMUNITY
Start: 2017-01-01

## 2017-01-01 RX ORDER — MORPHINE SULFATE 4 MG/ML
4 INJECTION, SOLUTION INTRAMUSCULAR; INTRAVENOUS
Status: COMPLETED | OUTPATIENT
Start: 2017-01-01 | End: 2017-01-01

## 2017-01-01 RX ORDER — IPRATROPIUM BROMIDE AND ALBUTEROL SULFATE 2.5; .5 MG/3ML; MG/3ML
1 SOLUTION RESPIRATORY (INHALATION) 4 TIMES DAILY
COMMUNITY

## 2017-01-01 RX ORDER — ALBUTEROL SULFATE 0.83 MG/ML
2.5 SOLUTION RESPIRATORY (INHALATION)
Status: DISCONTINUED | OUTPATIENT
Start: 2017-01-01 | End: 2017-01-01 | Stop reason: HOSPADM

## 2017-01-01 RX ORDER — HYDROXYZINE HYDROCHLORIDE 25 MG/1
25 TABLET, FILM COATED ORAL EVERY 6 HOURS PRN
Status: DISCONTINUED | OUTPATIENT
Start: 2017-01-01 | End: 2017-01-01 | Stop reason: HOSPADM

## 2017-01-01 RX ORDER — BISACODYL 10 MG
10 SUPPOSITORY, RECTAL RECTAL DAILY PRN
COMMUNITY

## 2017-01-01 RX ORDER — AMOXICILLIN 250 MG
4 CAPSULE ORAL 2 TIMES DAILY
Qty: 100 TABLET | Refills: 3 | COMMUNITY
Start: 2017-01-01

## 2017-01-01 RX ORDER — TRAMADOL HYDROCHLORIDE 50 MG/1
50 TABLET ORAL 3 TIMES DAILY PRN
Qty: 90 TABLET | Refills: 0 | Status: ON HOLD | COMMUNITY
Start: 2017-01-01 | End: 2017-01-01

## 2017-01-01 RX ORDER — SODIUM CHLORIDE 9 MG/ML
INJECTION, SOLUTION INTRAVENOUS CONTINUOUS
Status: DISCONTINUED | OUTPATIENT
Start: 2017-01-01 | End: 2017-01-01

## 2017-01-01 RX ORDER — ATROPINE SULFATE 10 MG/ML
2-4 SOLUTION/ DROPS OPHTHALMIC
COMMUNITY

## 2017-01-01 RX ORDER — LORAZEPAM 0.5 MG/1
.25-.5 TABLET ORAL EVERY 4 HOURS PRN
Status: ON HOLD | COMMUNITY
End: 2017-01-01

## 2017-01-01 RX ORDER — TRAMADOL HYDROCHLORIDE 50 MG/1
25 TABLET ORAL
Status: ON HOLD | COMMUNITY
End: 2017-01-01

## 2017-01-01 RX ORDER — METHADONE HYDROCHLORIDE 5 MG/1
2.5 TABLET ORAL EVERY 12 HOURS
Qty: 10 TABLET | Refills: 0 | Status: SHIPPED | DISCHARGE
Start: 2017-01-01

## 2017-01-01 RX ORDER — GUAIFENESIN 200 MG/1
400 TABLET ORAL EVERY 4 HOURS PRN
Qty: 60 TABLET | Refills: 0 | COMMUNITY
Start: 2017-01-01

## 2017-01-01 RX ORDER — ACETAMINOPHEN 325 MG/1
650 TABLET ORAL EVERY 6 HOURS PRN
Qty: 30 TABLET | Refills: 0 | Status: SHIPPED | OUTPATIENT
Start: 2017-01-01 | End: 2017-01-01 | Stop reason: DRUGHIGH

## 2017-01-01 RX ORDER — HYDROXYZINE HYDROCHLORIDE 25 MG/1
25 TABLET, FILM COATED ORAL EVERY 6 HOURS PRN
Qty: 20 TABLET | Refills: 0 | Status: SHIPPED | OUTPATIENT
Start: 2017-01-01

## 2017-01-01 RX ORDER — PIPERACILLIN SODIUM, TAZOBACTAM SODIUM 36; 4.5 G/152ML; G/152ML
3.38 INJECTION, POWDER, LYOPHILIZED, FOR SOLUTION INTRAVENOUS 4 TIMES DAILY
Qty: 675.2 ML | Refills: 0 | Status: SHIPPED | OUTPATIENT
Start: 2017-01-01 | End: 2017-01-01

## 2017-01-01 RX ORDER — ONDANSETRON 4 MG/1
4 TABLET, ORALLY DISINTEGRATING ORAL EVERY 6 HOURS PRN
Status: DISCONTINUED | OUTPATIENT
Start: 2017-01-01 | End: 2017-01-01 | Stop reason: HOSPADM

## 2017-01-01 RX ADMIN — SIMETHICONE CHEW TAB 80 MG 160 MG: 80 TABLET ORAL at 09:00

## 2017-01-01 RX ADMIN — GABAPENTIN 600 MG: 300 CAPSULE ORAL at 08:59

## 2017-01-01 RX ADMIN — PREDNISONE 10 MG: 10 TABLET ORAL at 17:22

## 2017-01-01 RX ADMIN — OXYCODONE HYDROCHLORIDE 5 MG: 5 TABLET ORAL at 06:44

## 2017-01-01 RX ADMIN — ACETAMINOPHEN 650 MG: 325 TABLET, FILM COATED ORAL at 16:00

## 2017-01-01 RX ADMIN — BUDESONIDE 0.5 MG: 0.5 INHALANT RESPIRATORY (INHALATION) at 08:23

## 2017-01-01 RX ADMIN — BUDESONIDE 0.5 MG: 0.5 INHALANT RESPIRATORY (INHALATION) at 21:56

## 2017-01-01 RX ADMIN — GADOBUTROL 10 ML: 604.72 INJECTION INTRAVENOUS at 14:38

## 2017-01-01 RX ADMIN — MONTELUKAST SODIUM 10 MG: 10 TABLET, FILM COATED ORAL at 20:24

## 2017-01-01 RX ADMIN — HYDROXYZINE HYDROCHLORIDE 25 MG: 25 TABLET ORAL at 20:23

## 2017-01-01 RX ADMIN — PREDNISONE 10 MG: 10 TABLET ORAL at 09:00

## 2017-01-01 RX ADMIN — METOPROLOL TARTRATE 12.5 MG: 25 TABLET ORAL at 09:00

## 2017-01-01 RX ADMIN — MORPHINE SULFATE 4 MG: 4 INJECTION, SOLUTION INTRAMUSCULAR; INTRAVENOUS at 09:26

## 2017-01-01 RX ADMIN — TAMSULOSIN HYDROCHLORIDE 0.4 MG: 0.4 CAPSULE ORAL at 09:00

## 2017-01-01 RX ADMIN — METHADONE HYDROCHLORIDE 2.5 MG: 5 TABLET ORAL at 09:00

## 2017-01-01 RX ADMIN — SENNOSIDES AND DOCUSATE SODIUM 1 TABLET: 8.6; 5 TABLET ORAL at 20:22

## 2017-01-01 RX ADMIN — IPRATROPIUM BROMIDE AND ALBUTEROL SULFATE 3 ML: .5; 3 SOLUTION RESPIRATORY (INHALATION) at 11:42

## 2017-01-01 RX ADMIN — OMEPRAZOLE 20 MG: 20 CAPSULE, DELAYED RELEASE ORAL at 17:22

## 2017-01-01 RX ADMIN — BUPROPION HYDROCHLORIDE 150 MG: 150 TABLET, FILM COATED, EXTENDED RELEASE ORAL at 09:00

## 2017-01-01 RX ADMIN — IPRATROPIUM BROMIDE AND ALBUTEROL SULFATE 3 ML: .5; 3 SOLUTION RESPIRATORY (INHALATION) at 21:49

## 2017-01-01 RX ADMIN — OMEPRAZOLE 20 MG: 20 CAPSULE, DELAYED RELEASE ORAL at 08:59

## 2017-01-01 RX ADMIN — TAZOBACTAM SODIUM AND PIPERACILLIN SODIUM 3.38 G: 375; 3 INJECTION, SOLUTION INTRAVENOUS at 12:10

## 2017-01-01 RX ADMIN — ACETAMINOPHEN 650 MG: 325 TABLET, FILM COATED ORAL at 06:45

## 2017-01-01 RX ADMIN — ONDANSETRON 4 MG: 2 INJECTION INTRAMUSCULAR; INTRAVENOUS at 09:26

## 2017-01-01 RX ADMIN — SENNOSIDES AND DOCUSATE SODIUM 1 TABLET: 8.6; 5 TABLET ORAL at 09:00

## 2017-01-01 RX ADMIN — TORSEMIDE 10 MG: 10 TABLET ORAL at 09:00

## 2017-01-01 RX ADMIN — SODIUM CHLORIDE: 9 INJECTION, SOLUTION INTRAVENOUS at 09:30

## 2017-01-01 RX ADMIN — OXYCODONE HYDROCHLORIDE 5 MG: 5 TABLET ORAL at 20:23

## 2017-01-01 RX ADMIN — METOPROLOL TARTRATE 12.5 MG: 25 TABLET ORAL at 20:23

## 2017-01-01 RX ADMIN — OXYCODONE HYDROCHLORIDE 5 MG: 5 TABLET ORAL at 17:23

## 2017-01-01 RX ADMIN — DULOXETINE HYDROCHLORIDE 60 MG: 30 CAPSULE, DELAYED RELEASE PELLETS ORAL at 09:00

## 2017-01-01 RX ADMIN — DULOXETINE HYDROCHLORIDE 60 MG: 30 CAPSULE, DELAYED RELEASE PELLETS ORAL at 17:21

## 2017-01-01 RX ADMIN — GABAPENTIN 600 MG: 300 CAPSULE ORAL at 20:23

## 2017-01-01 RX ADMIN — IPRATROPIUM BROMIDE AND ALBUTEROL SULFATE 3 ML: .5; 3 SOLUTION RESPIRATORY (INHALATION) at 16:25

## 2017-01-01 RX ADMIN — METHADONE HYDROCHLORIDE 2.5 MG: 5 TABLET ORAL at 20:22

## 2017-01-01 RX ADMIN — IPRATROPIUM BROMIDE AND ALBUTEROL SULFATE 3 ML: .5; 3 SOLUTION RESPIRATORY (INHALATION) at 08:23

## 2017-01-01 RX ADMIN — BUPROPION HYDROCHLORIDE 150 MG: 150 TABLET, FILM COATED, EXTENDED RELEASE ORAL at 17:20

## 2017-01-01 RX ADMIN — SIMETHICONE CHEW TAB 80 MG 160 MG: 80 TABLET ORAL at 20:23

## 2017-01-01 RX ADMIN — TAMSULOSIN HYDROCHLORIDE 0.4 MG: 0.4 CAPSULE ORAL at 17:23

## 2017-01-01 RX ADMIN — ACETAMINOPHEN 650 MG: 325 TABLET, FILM COATED ORAL at 14:07

## 2017-01-01 SDOH — SOCIAL STABILITY - SOCIAL INSECURITY: OTHER SPECIFIED PROBLEMS RELATED TO PRIMARY SUPPORT GROUP: Z63.8

## 2017-01-01 ASSESSMENT — ENCOUNTER SYMPTOMS
VOMITING: 0
HEADACHES: 1
DIZZINESS: 0
DIARRHEA: 0
NAUSEA: 1
LIGHT-HEADEDNESS: 0
WOUND: 1

## 2017-01-04 NOTE — PROGRESS NOTES
SUBJECTIVE:                                                    Karan Vaz is a 84 year old male with a complex PMH significant for COPD, CHF, sensory neuropathy, MDD, RLS, is being followed by the complex care program and is seen in the home today for the following health issues:     Patient is without hearing today. Communication was very challenging. Encounter held with patient and wife.     Heart Failure Follow-up    Symptoms:    Shortness of breath: happens with exertion and at rest- stable oxygen dependent 3L    Lower extremity edema: worse at night, elevates legs and improves.     Chest pain: No    Using more pillows than normal: No    Cough at night: No    Weight:    Checking weight daily: No    Weight change: none    Cardiology visits, ER/UC, or hospital admissions since last visit: None    Medication side effects: none     Depression Followup    Status since last visit: Stable. Says that sleep again is his most concerning symptom.     See PHQ-9 for current symptoms.  Other associated symptoms: None    Complicating factors:   Significant life event:  Yes-  declining health    Current substance abuse:  None  Anxiety or Panic symptoms:  No    PHQ-9  English PHQ-9   Any Language          Left sided ear/jaw  pain  Ongoing for months. Was seen by ENT and had biopsy returned negative for dysplasia and or malignancy. All imaging returned negative.  Was felt that it was r/t to TMJ arthralgia. Was recommended to have his top dentures properly fitted. Dentures were realigned and he was advised to do salt water rinses but pain remains the same. No new symptoms. Per wife patient has been taking 3-4 tablets of 500 mg tylenol at one time.     CT MAXILLOFACIAL WITHOUT CONTRAST 10/11/2016 6:01 PM       HISTORY: Left posterior ear pain.     TECHNIQUE: Axial images were obtained through the maxillofacial region  without contrast. Radiation dose for this scan was reduced using  automated exposure control, adjustment of  "the mA and/or kV according  to patient size, or iterative reconstruction technique. Coronal and  sagittal reconstructions were also acquired.     FINDINGS: The visualized paranasal sinuses are clear. Visualized left  mastoid air cells are clear. Left middle ear cavity and right middle  ear cavity are clear. Vascular calcifications are noted. There is no  evidence for fracture. Degenerative changes are seen in the upper  cervical spine. Soft tissues are unremarkable including the  nasopharynx.     IMPRESSION: Negative maxillofacial CT    Insomnia  Was started on Trazodone 25 mg HS last month. He says it's done \"nothing\". He's waking twice a night to go to the bathroom then he returns back to sleep within 30 min. This is a significant improvement from our initial encounter when he reported that he does not sleep at all.  He eats ice cream right before bed,wathces TV  and he goes to bed at 11pm. He doesn't have a bedtime ritual.     Wife wants to know if its safe to leave him home alone. She is the primary caregiver. Long discussion held today surrounding his needs and that it would be beneficial to have patient's daughter involved in conversation. When discussed extra services patient says they have no money for this.     COPD Follow-Up    Symptoms are currently: stable    Current fatigue or dyspnea with ambulation: stable     Shortness of breath: stable    Increased or change in Cough/Sputum: No    Fever(s): No    Baseline ambulation without stopping to rest 0 feet. Able to walk up 0 flights of stairs without stopping to rest.    Any ER/UC or hospital admissions since your last visit? No     History   Smoking status     Former Smoker     Quit date: 01/01/1990   Smokeless tobacco     Never Used     Comment: 20 years ago     No results found for this basename: FEV1, [id number]       Amount of exercise or physical activity: able to walk a few feet around house with walker     Problems taking medications regularly: No " daughter sets up his medications with a radha box.     Medication side effects: none    Diet: regular (no restrictions)    Self Care: with help from wife  Activity/mobility:walks with walker.   Nutrition: eats what he wants. Really doesn't monitor his diet  Housing: lives at home with wife.   Significant life event in past year: physical health decline  :     Problem list and histories reviewed & adjusted, as indicated.  Additional history: as documented    Patient Active Problem List   Diagnosis     Myoclonus     History of peptic ulcer disease     elastofibroma thoracic wall, benign     Restless legs syndrome (RLS)     Sensory neuropathy (H)     Health Care Home     Hypertrophy of prostate with urinary retention     Carpal tunnel syndrome     Glaucoma     Degeneration of cervical intervertebral disc     Anal sphincter incompetence     ACP (advance care planning)     Primary insomnia     Panlobular emphysema (H)     Primary osteoarthritis of right shoulder     Bilateral edema of lower extremity     Essential hypertension     Acute on chronic respiratory failure (H)     Acute on chronic diastolic congestive heart failure (H)     Dysphagia     Gastroesophageal reflux disease, esophagitis presence not specified     Major depressive disorder, recurrent episode, moderate (H)     Past Surgical History   Procedure Laterality Date     C shoulder surg proc unlisted       Shoulder     C shoulder surg proc unlisted       Shoulder     C shoulder surg proc unlisted       Shoulder     Hc revise median n/carpal tunnel surg  7/01/02     simonet, left     Hc revise median n/carpal tunnel surg  09/24/02     Dr Montiel left     Arthroplasty hip bilateral       Arthroscopy shoulder distal clavicle repair  5/30/2012     Procedure:ARTHROSCOPY SHOULDER DISTAL CLAVICLE RESECTION; Left shoulder arthroscopy, Bicep tenontomy, debridement of partial thickness cuff tear, Subacromial Decompression.; Surgeon:MONIE MENDIETA; Location: OR      Prost. microwave thermotx  2012     dr Sin      Sigmoidoscopy flexible N/A 2/16/2016     Procedure: SIGMOIDOSCOPY FLEXIBLE;  Surgeon: Ky Small MD;  Location:  GI       Social History   Substance Use Topics     Smoking status: Former Smoker     Quit date: 01/01/1990     Smokeless tobacco: Never Used      Comment: 20 years ago     Alcohol Use: No     Family History   Problem Relation Age of Onset     Colon Cancer No family hx of          Current Outpatient Prescriptions   Medication Sig Dispense Refill     gabapentin (NEURONTIN) 300 MG capsule TAKE TWO CAPSULES BY MOUTH TWICE DAILY 120 capsule 0     budesonide (PULMICORT) 0.5 MG/2ML neb solution Take 2 mLs (0.5 mg) by nebulization 2 times daily 60 mL 3     torsemide (DEMADEX) 10 MG tablet TAKE ONE TABLET BY MOUTH ONE TIME DAILY  30 tablet 0     omeprazole (PRILOSEC) 20 MG CR capsule TAKE ONE CAPSULE BY MOUTH ONE TIME DAILY  30 capsule 0     SPIRIVA HANDIHALER 18 MCG capsule INHALE 1 CAPSULE (18 MCG) BY INHALATION ROUTE ONCE DAILY 30 capsule 0     BROVANA 15 MCG/2ML NEBU neb solution Inhale 1 vial (2mls) by nebulization 2 times a day 120 mL 0     metoprolol (LOPRESSOR) 25 MG tablet TAKE ONE-HALF TABLET BY MOUTH 2 TIMES DAY 90 tablet 0     buPROPion (WELLBUTRIN XL) 150 MG 24 hr tablet TAKE ONE TABLET BY MOUTH EVERY NIGHT AT BEDTIME 90 tablet 0     tamsulosin (FLOMAX) 0.4 MG capsule TAKE ONE CAPSULE BY MOUTH EVERY DAY 90 capsule 0     montelukast (SINGULAIR) 10 MG tablet TAKE ONE TABLET BY MOUTH EVERY NIGHT AT BEDTIME 90 tablet 0     traZODone (DESYREL) 50 MG tablet Take 12.5 mg at bedtime for 1 week, then increase 25 mg at bedtime. 90 tablet 1     mirtazapine (REMERON) 15 MG tablet Decrease 7.5 mg at bedtime for 1 week then discontinue. 30 tablet 0     albuterol (PROAIR HFA, PROVENTIL HFA, VENTOLIN HFA) 108 (90 BASE) MCG/ACT inhaler Inhale 2 puffs into the lungs every 6 hours as needed for shortness of breath / dyspnea 1 Inhaler 6     Simethicone 180 MG  CAPS Take 1 capsule by mouth 2 times daily 60 capsule      lidocaine (XYLOCAINE) 5 % ointment Apply topically as needed for moderate pain 50 g 1     senna-docusate (SENOKOT-S;PERICOLACE) 8.6-50 MG per tablet Take 1 tablet by mouth 2 times daily as needed for constipation 100 tablet 3     albuterol (2.5 MG/3ML) 0.083% nebulizer solution INHALE 1 VIAL BY NEBULIZATION EVERY 2 HOURS AS NEEDED FOR DYSPNEA  540 mL 0     order for DME Equipment being ordered: Oxygen Titration visit [facility]  Fax # [phone]  [phone]     DULoxetine (CYMBALTA) 60 MG capsule Take 1 capsule (60 mg) by mouth daily 90 capsule 0     MAPAP 500 MG tablet TAKE 1 TABLET BY MOUTH EVERY 4 HOURS AS NEEDED AND 2 TABLETS BY MOUTH EVERY NIGHT AT BEDTIME 240 tablet 0     Allergies   Allergen Reactions     No Known Allergies      Recent Labs   Lab Test  10/11/16   1620  04/18/16   0600   02/21/16   0450   02/19/16   0630   05/14/14   1345   04/07/13   0553   06/23/11   0500 03/30/11   A1C   --    --    --    --    --   6.3*   --    --    --   5.4   --    --    --    LDL   --    --    --    --    --    --    --    --    --    --    --   132*   --    HDL   --    --    --    --    --    --    --    --    --    --    --   41   --    TRIG   --    --    --    --    --    --    --    --    --    --    --   195*   --    ALT  23   --    --   58   --   37   < >  22   --    --    < >   --    --    CR  0.94  0.74   < >  0.81   < >  0.74   < >  0.86   < >  0.72   < >   --    --    GFRESTIMATED  76  >90  Non  GFR Calc     < >  >90  Non  GFR Calc     < >  >90  Non  GFR Calc     < >  81   < >  >90   < >   --    --    GFRESTBLACK  >90   GFR Calc    >90   GFR Calc     < >  >90   GFR Calc     < >  >90   GFR Calc     < >   --    --   >90   < >   --    --    POTASSIUM  4.6  4.3   < >  4.0   < >  4.5   < >  4.9   < >  4.2   < >   --    --    TSH   --    --    --     --    --    --    --   1.15   --    --    --    --   0.86    < > = values in this interval not displayed.      BP Readings from Last 3 Encounters:   11/29/16 120/70   11/16/16 144/68   11/01/16 116/58    Wt Readings from Last 3 Encounters:   10/11/16 225 lb (102.059 kg)   10/11/16 226 lb (102.513 kg)   04/22/16 204 lb 12.8 oz (92.897 kg)            Labs reviewed in EPIC  Problem list, Medication list, Allergies, and Medical/Social/Surgical histories reviewed in [facility] and updated as appropriate.    ROS:  Constitutional, HEENT, cardiovascular, pulmonary, GI, , musculoskeletal, neuro, skin, endocrine and psych systems are negative, except as otherwise noted.    OBJECTIVE:                                                    /70 mmHg  Pulse 77  SpO2 96%  There is no weight on file to calculate BMI.  GENERAL:  male, alert, sitting up at kitchen table, oxygen dependent .  EYES: Eyes grossly normal to inspection, PERRL and conjunctivae and sclerae normal  HENT:Head-normocephalic without lesions. Left ear: auricle and pinna non tender, mouth 1 tooth lower, no erythema no palpable masses.   RESP: lungs clear to auscultation - no rales, rhonchi or wheezes  CV: regular rate and rhythm, normal S1 S2, no S3 or S4, no murmur, click or rub  ABDOMEN: obese, soft, nontender, without hepatosplenomegaly or masses and bowel sounds normal  MS: no gross musculoskeletal defects noted, edema improved.  SKIN: no suspicious lesions or rashes  NEURO: Normal strength and tone, mentation intact and speech normal  PSYCH: seemed disengaged, somewhat cooperative but pleasant    Diagnostic Test Results:  none      ASSESSMENT/PLAN:                                                      1. Primary insomnia  Was started on trazodone 25 mg last encounter. Patient does not feel his sleep has improved though what he tells me today compared with or initial encounter there has been significant improvement  He has very poor sleep hygiene. Long  discussion surrounding this today. Unsure if he is committed to make any lifestyle modifications. No medication change.     2. Acute on chronic diastolic congestive heart failure (H)  Stable. Breathing is at baseline oxygen dependent.     3. TMJ (temporomandibular joint syndrome)  Pain is unchanged since last encounter. Was seen by dentist and had dentures sized.    Patient has been self medicating with high doses of acetaminophen. Discussed risks of this.   Reviewed treatment options both pharmacologic therapy and self care . He's uninterested in medication. He's also not very engaged in behavior change. MTM out to see patient next week. Starting a low dose skeletal muscle relaxant could be beneficial.        EMI Osborn [facility]  75 min spent in direct face to face time with this patient and wife in the home, greater than 50% in counseling sleep, meds, breathing, as stated above and coordination of care as stated above.

## 2017-01-04 NOTE — PROGRESS NOTES
[facility] Complex [facility]  January 4, 2017      Behavioral Health Clinician Progress Note    Patient Name: Karan Vaz           Service Type: Individual      Service Location:   Face to Face in Home / Community     Session Start Time: 10:30 a.m.  Session End Time: 11:20 a.m.      Session Length: 38 - 52      Attendees: Patient and PCP    Visit Activities (Refresh list every visit): [facility] [employer]    Diagnostic Assessment Date: 12/8/16  Treatment Plan Review Date: 4/4/17  See Flowsheets for today's PHQ-9 and SIDNEY-7 results  Previous PHQ-9:   PHQ-9 SCORE 7/2/2012 10/12/2015 10/25/2016   Total Score 19 - -   Total Score - 18 22     Previous SIDNEY-7:   SIDNEY-7 SCORE 10/25/2016   Total Score 18       TANNER LEVEL:  TANNER Score (Last Two) 3/25/2016   TANNER Raw Score 0   Activation Score 0   TANNER Level 1       DATA  Extended Session (60+ minutes): No  Interactive Complexity: No  Crisis: No    Treatment Objective(s) Addressed in This Session:  Target Behavior(s): disease management/lifestyle changes depression    Depressed Mood: Increase interest, engagement, and pleasure in doing things  Decrease frequency and intensity of feeling down, depressed, hopeless  Improve quantity and quality of night time sleep / decrease daytime naps  Feel less tired and more energy during the day   Identify negative self-talk and behaviors: challenge core beliefs, myths, and actions  Decrease thoughts that you'd be better off dead or of suicide / self-harm    Current Stressors / Issues:  [facility] co-visit with patient, PCP, and patient's wife, Britt. Patient's hearing aid needs replacement, impairing communication today. Patient reports that medication is not helping with sleep due to chronic RLS. Patient reports he will wake in the middle of the night and walk to the kitchen to get something to eat. Discussed sleep hygiene regimen - pt reports that he turns off the television and lies down around 11:00, then wakes in the middle of the night and  cannot fall back to sleep. Patient's wife wakes him in the morning and he returns to sleep after taking his medications, and gets up for the day around noon. Explained sleep hygiene recommendations and whether it would be helpful if pt tried to rest within 30 minutes of taking trazodone. Discussed meditating on peaceful images or engaging in a calming practice before going to bed.   Patient reports his mood is generally unchanged since the last visit. He enjoyed the holidays, though worried about his breathing much of the time.   Patient has concerns about incurring charges for clinic visits.     Progress on Treatment Objective(s) / Homework:  Stable - ACTION (Actively working towards change); Intervened by reinforcing change plan / affirming steps taken    Motivational Interviewing    MI Intervention: Co-Developed Goal: review/update psychiatric medications, Expressed Empathy/Understanding, Supported Autonomy, Collaboration, Evocation, Permission to raise concern or advise, Open-ended questions and Reflections: simple and complex     Change Talk Expressed by the Patient: Desire to change Ability to change Reasons to change Need to change    Provider Response to Change Talk: E - Evoked more info from patient about behavior change, A - Affirmed patient's thoughts, decisions, or attempts at behavior change, R - Reflected patient's change talk and S - Summarized patient's change talk statements      Care Plan review completed: No    Medication Review:  No changes to current psychiatric medication(s) MTM will review dosage of Trazodone.      Medication Compliance:  Yes    Changes in Health Issues:   Yes: Sleep disturbance, Associated Psychological Distress  Chronic disease management, Associated Psychological Distress    Chemical Use Review:   Substance Use: Chemical use reviewed, no active concerns identified      Tobacco Use: No current tobacco use.      Assessment: Current Emotional / Mental Status (status of  significant symptoms):  Risk status (Self / Other harm or suicidal ideation)  Patient denies a history of suicidal ideation, suicide attempts, self-injurious behavior, homicidal ideation, homicidal behavior and and other safety concerns  Patient denies current fears or concerns for personal safety.  Patient denies current or recent suicidal ideation or behaviors.  Patient denies current or recent homicidal ideation or behaviors.  Patient denies current or recent self injurious behavior or ideation.  Patient denies other safety concerns.  A safety and risk management plan has not been developed at this time, however patient was encouraged to call [place] [phone] should there be a change in any of these risk factors.  Patient endorsed passive thoughts of being better off dead, as he is frustrated by loss of independence and reliance on his wife.    Appearance:   Appropriate   Eye Contact:   Good   Psychomotor Behavior: Agitated  Normal   Attitude:   Cooperative   Orientation:   All  Speech   Rate / Production: Normal    Volume:  Normal   Mood:    Anxious  Normal  Affect:    Appropriate   Thought Content:  Clear   Thought Form:  Coherent  Logical   Insight:    Fair     Diagnoses:  1. Major depressive disorder, recurrent episode, moderate (H)        Collateral Reports Completed:  Not Applicable    Plan: (Homework, other):  Patient was given information about behavioral services and scheduled a follow up appointment with the clinic [facility] on 1/12/17 with MTM. He was also given information about mental health symptoms and treatment options , deep Breathing Strategies to practice when experiencing anxiety and depression and sleep hygiene strategies.  CD Recommendations: No indications of CD issues.       TORSTEN Patel, [facility]                                                    Treatment Plan    Client's Name: Karan Vaz  YOB: 1932    Date: 1/4/17    DSM-V Diagnoses: 296.32 Major Depressive Disorder,  Recurrent Episode, Moderate _ and With anxious distress  Psychosocial / Contextual Factors: patient lives at home with his wife, who is also his primary caregiver. Both are stressed as it is difficult for his wife to care for him.  WHODAS: 58    Referral / Collaboration:  The following referral(s) was/were discussed but client declines follow up at this time. [facility].    Anticipated number of session or this episode of care: 12-24/year      MeasurableTreatment Goal(s) related to diagnosis / functional impairment(s)  Goal 1: Client will practice sleep hygiene and reduce anxiety in regard to breathing.    I will know I've met my goal when I can sleep and breathe better.      Objective #A (Client Action)    Client will identify mindfulness strategies to more effectively address stressors.  Status: New - Date: 1/4/17     Intervention(s)  Therapist will teach mindfulness skills: deep breathing, imagery, mediation, sleep hygiene.    Client has reviewed and agreed to the above plan.      ATILIO Patel, [facility]  January 4, 2017

## 2017-01-04 NOTE — MR AVS SNAPSHOT
After Visit Summary   1/4/2017    Karan Vaz    MRN: [id number]           Patient Information     Date Of Birth          10/7/1932        Visit Information        Provider Department      1/4/2017 12:15 PM Nidia Mitchell APRN CNP [facility]        Today's Diagnoses     Primary insomnia    -  1     Acute on chronic diastolic congestive heart failure (H)         TMJ (temporomandibular joint syndrome)           Care Instructions    No medication changes    Please do not take more than 3000 mg of Tylenol in 24 hrs    Stay on trazodone 25 mg at bedtime. We had a long discussion about good sleep hygiene. No eating past 7 pm, limit fluids to avoid having to go to the bathroom, limit TV and reading. Take trazodone 30 mins before bedtime.     For left sided ear pain we reviewed all of your imaging studies and there was nothing abnormal that was found.Try to practice relaxation techniques, and avoiding stress.  WE can consider trying a muscle relaxant to help your muscles around your jaw relax. Karla will be out next week.             Follow-ups after your visit        Your next 10 appointments already scheduled     Jan 12, 2017  3:15 PM   Office Visit with Karla Palafox [facility] ([facility])    [address] [place] [id number] [phone]           Bring a current list of meds and any records pertaining to this visit.  For Physicals, please bring immunization records and any forms needing to be filled out.  Please arrive 10 minutes early to complete paperwork.            Jan 12, 2017  3:15 PM   [facility]    [address] [place] [id number] [phone]            Feb 10, 2017 11:00 AM   Return Visit with EMI Orta CNP   [facility] ([facility])    [address]  [address]  [facility] [address] [phone]            Mar 10, 2017  9:45 AM   Return Visit with EMI Orta CNP   [facility] ([facility])    [address]  [address]  [facility] [id number] [phone]              Who to contact     If you have questions or need follow up information about today's clinic visit or your schedule please contact [facility] directly at [phone].  Normal or non-critical lab and imaging results will be communicated to you by MyChart, letter or phone within 4 business days after the clinic has received the results. If you do not hear from us within 7 days, please contact the clinic through MyChart or phone. If you have a critical or abnormal lab result, we will notify you by phone as soon as possible.  Submit refill requests through [facility] or call your pharmacy and they will forward the refill request to us. Please allow 3 business days for your refill to be completed.          Additional Information About Your Visit        Care EveryWhere ID     This is your Care EveryWhere ID. This could be used by other organizations to access your [place] medical records  [id number]        Your Vitals Were     Pulse Pulse Oximetry                77 96%           Blood Pressure from Last 3 Encounters:   01/04/17 130/70   11/29/16 120/70   11/16/16 144/68    Weight from Last 3 Encounters:   10/11/16 225 lb (102.059 kg)   10/11/16 226 lb (102.513 kg)   04/22/16 204 lb 12.8 oz (92.897 kg)              Today, you had the following     No orders found for display       Primary Care Provider Office Phone # Fax #    EMI Orta [phone]       [facility] [address]  [facility] [address]        Thank you!     Thank you for choosing [facility]  for your care. Our goal is  always to provide you with excellent care. Hearing back from our patients is one way we can continue to improve our services. Please take a few minutes to complete the written survey that you may receive in the mail after your visit with us. Thank you!             Your Updated Medication List - Protect others around you: Learn how to safely use, store and throw away your medicines at www.disposemymeds.org.          This list is accurate as of: 1/4/17 11:59 PM.  Always use your most recent med list.                   Brand Name Dispense Instructions for use    * albuterol (2.5 MG/3ML) 0.083% neb solution     540 mL    INHALE 1 VIAL BY NEBULIZATION EVERY 2 HOURS AS NEEDED FOR DYSPNEA       * albuterol 108 (90 BASE) MCG/ACT Inhaler    PROAIR HFA/PROVENTIL HFA/VENTOLIN HFA    1 Inhaler    Inhale 2 puffs into the lungs every 6 hours as needed for shortness of breath / dyspnea       BROVANA 15 MCG/2ML Nebu neb solution   Generic drug:  arformoterol     120 mL    Inhale 1 vial (2mls) by nebulization 2 times a day       budesonide 0.5 MG/2ML neb solution    PULMICORT    60 mL    Take 2 mLs (0.5 mg) by nebulization 2 times daily       buPROPion 150 MG 24 hr tablet    WELLBUTRIN XL    90 tablet    TAKE ONE TABLET BY MOUTH EVERY NIGHT AT BEDTIME       DULoxetine 60 MG EC capsule    CYMBALTA    90 capsule    Take 1 capsule (60 mg) by mouth daily       gabapentin 300 MG capsule    NEURONTIN    120 capsule    TAKE TWO CAPSULES BY MOUTH TWICE DAILY       lidocaine 5 % ointment    XYLOCAINE    50 g    Apply topically as needed for moderate pain       MAPAP 500 MG tablet   Generic drug:  acetaminophen     240 tablet    TAKE 1 TABLET BY MOUTH EVERY 4 HOURS AS NEEDED AND 2 TABLETS BY MOUTH EVERY NIGHT AT BEDTIME       metoprolol 25 MG tablet    LOPRESSOR    90 tablet    TAKE ONE-HALF TABLET BY MOUTH 2 TIMES DAY       mirtazapine 15 MG tablet    REMERON    30 tablet    Decrease 7.5 mg at bedtime for 1 week then discontinue.        montelukast 10 MG tablet    SINGULAIR    90 tablet    TAKE ONE TABLET BY MOUTH EVERY NIGHT AT BEDTIME       omeprazole 20 MG CR capsule    priLOSEC    30 capsule    TAKE ONE CAPSULE BY MOUTH ONE TIME DAILY       order for DME      Equipment being ordered: Oxygen Titration visit [facility] Fax # [phone]       senna-docusate 8.6-50 MG per tablet    SENOKOT-S;PERICOLACE    100 tablet    Take 1 tablet by mouth 2 times daily as needed for constipation       Simethicone 180 MG Caps     60 capsule    Take 1 capsule by mouth 2 times daily       SPIRIVA HANDIHALER 18 MCG capsule   Generic drug:  tiotropium     30 capsule    INHALE 1 CAPSULE (18 MCG) BY INHALATION ROUTE ONCE DAILY       tamsulosin 0.4 MG capsule    FLOMAX    90 capsule    TAKE ONE CAPSULE BY MOUTH EVERY DAY       torsemide 10 MG tablet    DEMADEX    30 tablet    TAKE ONE TABLET BY MOUTH ONE TIME DAILY       traZODone 50 MG tablet    DESYREL    90 tablet    Take 12.5 mg at bedtime for 1 week, then increase 25 mg at bedtime.       * Notice:  This list has 2 medication(s) that are the same as other medications prescribed for you. Read the directions carefully, and ask your doctor or other care provider to review them with you.

## 2017-01-12 NOTE — PROGRESS NOTES
[facility]  January 12, 2017      Behavioral Health Clinician Progress Note    Patient Name: Karan Vaz           Service Type: Individual      Service Location:   Face to Face in Home / Community     Session Start Time: 3:15 p.m.  Session End Time: 4:05 p.m.      Session Length: 38 - 52      Attendees: Patient and MTM    Visit Activities (Refresh list every visit): [facility] Covisit    Diagnostic Assessment Date: 12/8/16  Treatment Plan Review Date: 4/4/17  See Flowsheets for today's PHQ-9 and SIDNEY-7 results  Previous PHQ-9:   PHQ-9 SCORE 7/2/2012 10/12/2015 10/25/2016   Total Score 19 - -   Total Score - 18 22     Previous SIDNEY-7:   SIDNEY-7 SCORE 10/25/2016   Total Score 18       TANNER LEVEL:  TANNER Score (Last Two) 3/25/2016   TANNER Raw Score 0   Activation Score 0   TANNER Level 1       DATA  Extended Session (60+ minutes): No  Interactive Complexity: No  Crisis: No    Treatment Objective(s) Addressed in This Session:  Target Behavior(s): disease management/lifestyle changes depression    Depressed Mood: Increase interest, engagement, and pleasure in doing things  Decrease frequency and intensity of feeling down, depressed, hopeless  Improve quantity and quality of night time sleep / decrease daytime naps  Feel less tired and more energy during the day   Identify negative self-talk and behaviors: challenge core beliefs, myths, and actions  Decrease thoughts that you'd be better off dead or of suicide / self-harm    Current Stressors / Issues:  [facility] co-visit with patient and MTM. Patient reports primary concerns for today's visit are sleep and restless legs, which get worse at night. Patient reports he goes to bed around 11:00, then lies awake or sits on the edge of the bed, falling asleep around 3:00. Britt wakes him around 9:00 to administer medications, and he returns to sleep from about 10:00 to 12:00. Discussed whether patient can try staying awake at 9:00 - patient agreed to try. Patient was  given sleep hygiene recommendations/handout, which he reports will not help if he cannot manage RLS.   Patient agreed to try establishing a sleep routine including when to try sleeping after taking medication.  Patient reports his mood is generally unchanged and that he worries about breathing. Patient states he is trying to practice taking slow, deep breaths, and relaxing his body.  Patient reviewed medications with MTM. He states he is sleeping somewhat better with the trazodone and will continue taking this as prescribed.  Patient and his wife report feeling overwhelmed with her having to provide all cares. They are open to [facility] if insurance covers it.   PCP will review for whether there is a skilled nursing need.    Progress on Treatment Objective(s) / Homework:  New Objective established this session - PREPARATION (Decided to change - considering how); Intervened by negotiating a change plan and determining options / strategies for behavior change, identifying triggers, exploring social supports, and working towards setting a date to begin behavior change    Motivational Interviewing    MI Intervention: Co-Developed Goal: review/update psychiatric medications, Expressed Empathy/Understanding, Supported Autonomy, Collaboration, Evocation, Permission to raise concern or advise, Open-ended questions and Reflections: simple and complex     Change Talk Expressed by the Patient: Desire to change Ability to change Reasons to change Need to change    Provider Response to Change Talk: E - Evoked more info from patient about behavior change, A - Affirmed patient's thoughts, decisions, or attempts at behavior change, R - Reflected patient's change talk and S - Summarized patient's change talk statements      Care Plan review completed: No    Medication Review:  Changes to psychiatric medications, see updated Medication List in EPIC.      Medication Compliance:  Yes    Changes in Health Issues:  Please see medical  chart.    Chemical Use Review:   Substance Use: Chemical use reviewed, no active concerns identified      Tobacco Use: No current tobacco use.      Assessment: Current Emotional / Mental Status (status of significant symptoms):  Risk status (Self / Other harm or suicidal ideation)  Patient denies a history of suicidal ideation, suicide attempts, self-injurious behavior, homicidal ideation, homicidal behavior and and other safety concerns  Patient denies current fears or concerns for personal safety.  Patient denies current or recent suicidal ideation or behaviors.  Patient denies current or recent homicidal ideation or behaviors.  Patient denies current or recent self injurious behavior or ideation.  Patient denies other safety concerns.  A safety and risk management plan has not been developed at this time, however patient was encouraged to call [place] [phone] should there be a change in any of these risk factors.  Patient endorsed passive thoughts of being better off dead, as he is frustrated by loss of independence and reliance on his wife.    Appearance:   Appropriate   Eye Contact:   Good   Psychomotor Behavior: Agitated  Normal   Attitude:   Cooperative   Orientation:   All  Speech   Rate / Production: Normal    Volume:  Normal   Mood:    Anxious  Depressed  Normal  Affect:    Appropriate   Thought Content:  Clear   Thought Form:  Coherent  Logical   Insight:    Fair     Diagnoses:  1. Major depressive disorder, recurrent episode, moderate (H)        Collateral Reports Completed:  Collateral with PCP advising of patient request for skilled nursing. PCP will review for appropriateness of [facility] referral.    Plan: (Homework, other):  Patient was given information about behavioral services and will schedule a follow up visit as needed. He was also given information about mental health symptoms and treatment options , deep Breathing Strategies to practice when experiencing anxiety and depression and  sleep hygiene strategies.  CD Recommendations: No indications of CD issues.       Roshan Powell, [facility], [facility]                                                    Treatment Plan    Client's Name: Karan Vaz  YOB: 1932    Date: 1/12/17    DSM-V Diagnoses: 296.32 Major Depressive Disorder, Recurrent Episode, Moderate With anxious distress  Psychosocial / Contextual Factors: patient lives at home with his wife, who is also his primary caregiver. It is increasingly difficult for her to care for him, and they are open to home care if it's covered by insurance. Patient's primary goal is to remain in his home.  WHODAS: 58    Referral / Collaboration:  Was/were discussed and client will pursue: [facility] referral    Anticipated number of session or this episode of care: 12-24 per year      MeasurableTreatment Goal(s) related to diagnosis / functional impairment(s)  Goal 1: Client will manage anxiety and depression by taking medications as prescribed and practicing adaptive coping skills to manage SOB.    I will know I've met my goal when I can sleep and not worry so much.      Objective #A (Client Action)    Client will Decrease frequency and intensity of feeling down, depressed, hopeless  Improve quantity and quality of night time sleep / decrease daytime naps  Feel less tired and more energy during the day   Identify negative self-talk and behaviors: challenge core beliefs, myths, and actions  Decrease thoughts that you'd be better off dead or of suicide / self-harm.  Status: New - Date: 1/12/17     Intervention(s)  Therapist will teach mindful breathing and meditation to induce relaxation.    Objective #B  Client will Increase interest, engagement, and pleasure in doing things  Decrease frequency and intensity of feeling down, depressed, hopeless  Feel less tired and more energy during the day   Identify negative self-talk and behaviors: challenge core beliefs, myths, and actions.  Status: New - Date:  1/12/17     Intervention(s)  Therapist will provide educational materials on sleep hygiene.    Client has reviewed and agreed to the above plan.      Roshan Powell January 12, 2017

## 2017-01-12 NOTE — PROGRESS NOTES
"SUBJECTIVE/OBJECTIVE:                                                    Karan Vaz is a 84 year old male seen at their home for a follow-up visit for Medication Therapy Management.  He was referred to me from Nidia Mitchell. Seen as a covisit with Roshan Powell [facility]. Wife Morenita also present for visit.  This is a follow-up visit but the first of this calendar year.    Chief Complaint: Follow up from our visit on 11/16/16.  insomnia    Medication Adherence: no issues reported by patient and has assistance setting up med boxes (daughter). Hard to swallow pills but able to get them down with applesauce.  Takes pills twice a day. Nebulizer twice a day and wife helps him with this.    COPD: Current medications: Albuterol MDI, Nebs BID and (Pt reports using albuterol MDI rarely but carries in pocket), Tiotropium (Spiriva) once daily, Arformoterol (Brovana) twice daily and budesonide BID. Pt rinses their mouth after using steroid inhaler.  Sometimes forgets to rinse.   Cost of Brovana and Spiriva is an issue, about $150 each per month. Called pharmacy since Performomist had been ordered to check cost but it's the same price as Brovana.   Pt is not experiencing side effects.   Pt reports the following symptoms: SOB with exertion, same as usual. Has times when he is so SOB that he is scared he will suffocate.    Spirometry has been completed, seen by Dr Oliveira at [facility]. He is not planning on going back at this time. Told \"nothing else I can do for you\"  Per 4/2016 provider note:  \"Pt has severe COPD, emphysema on CT and FEV1 of 44% predicted last year. He's had multiple hospitalizations for AECOPD with slower recovery each time.  He is on home O2 at 3 LPM and had significant LE edema which is now resolved although his 2016 echo did not show RV failure.  He uses a walker at home and has < 100 feet walking distance.  The only \"positive\" factor is that is PCO2 is not chronically elevated.   Overall he's had a " "downhill course despite max medical therapy and multiple hospitalizations.  His life expectancy over next 1-2  years is low (but not zero) and he's unlikely to return to good functional status.  At a minimum I recommended DNI status but ok to treat the next episode of respiratory failure with bipap, O2, nebs, antibiotics etc.  They will think this over and make decisions. \"  RLS/neuropathy: currently taking gabapentin 600mg BID, Cymbalta 60mg daily. Complains of restless legs, problematic in the afternoon through the evening. Keeps him up at night. This has been a longstanding problem and followed by neurologist for years. Goes to bed at 11pm, lies awake until 2-3am. Up at 9am for medicines then back to sleep until 12pm.   Reviewed neurology records, some difficulty discerning RLS from neuropathy. Stated in notes Cymbalta seemed to make a small positive difference. Takes 6 apap at night to help out with RLS and helps a little.   Per chart review, has tried a number of medications in the past including clonazepam (up to 1mg TID), ropinirole, pramipexole (GI upset), Sinemet (hypotension), amitriptyline.  There was brief mention in a PCP note in 2011 of taking Lyrica but unable to find any other documentation of this trial in neurology notes or historical meds.  Depression/insomnia:  Current medications include: Duloxetine 60mg once daily, Bupropion XL 150mg once daily and trazodone 25mg daily. Pt reports that depression symptoms are unchanged. Continues to state sleep is his biggest complaint. Trazodone has not been helpful. Patient reports the following stressors: chronic health condition (RLS, COPD)  PHQ-9 SCORE 7/2/2012 10/12/2015 10/25/2016   Total Score 19 - -   Total Score - 18 22       Current labs include:  BP Readings from Last 3 Encounters:   01/12/17 138/78   01/04/17 130/70   11/29/16 120/70     A1C      6.3   2/19/2016.  CHOL      212   6/23/2011  TRIG      195   6/23/2011  HDL       41   6/23/2011  LDL   "    132   6/23/2011    Liver Function Studies -   Recent Labs   Lab Test  10/11/16   1620   PROTTOTAL  8.2   ALBUMIN  4.0   BILITOTAL  0.4   ALKPHOS  107   AST  33   ALT  23       No results found for: UCRR, MICROL, UMALCR    Last Basic Metabolic Panel:  NA      137   10/11/2016   POTASSIUM      4.6   10/11/2016  CHLORIDE      101   10/11/2016  BUN       21   10/11/2016  BUN   NOT APPLICABLE   12/19/2014  CR     0.94   10/11/2016  GFR ESTIMATE   Date Value Ref Range Status   10/11/2016 76 >60 mL/min/1.7m2 Final     Comment:     Non  GFR Calc   04/18/2016 >90  Non  GFR Calc   >60 mL/min/1.7m2 Final   04/14/2016 >90  Non  GFR Calc   >60 mL/min/1.7m2 Final     GFR ESTIMATE IF BLACK   Date Value Ref Range Status   10/11/2016 >90   GFR Calc   >60 mL/min/1.7m2 Final   04/18/2016 >90   GFR Calc   >60 mL/min/1.7m2 Final   04/14/2016 >90   GFR Calc   >60 mL/min/1.7m2 Final     TSH   Date Value Ref Range Status   05/14/2014 1.15 0.40 - 4.50 mIU/L Final   ]  /78 mmHg  Pulse 78  SpO2 97%    Most Recent Immunizations   Administered Date(s) Administered     Influenza (H1N1) 12/15/2009     Influenza (IIV3) 08/27/2012     Influenza Vaccine IM 3yrs+ 4 Valent IIV4 09/27/2016     Pneumococcal (PCV 13) 02/02/2015     Pneumococcal 23 valent 01/01/2002     TD (ADULT, 7+) 01/23/2007     Zoster vaccine, live 01/12/2011     ASSESSMENT:                                                    Current medications were reviewed today.      Medication Adherence: no issues identified    COPD: stable. Helped look for lower cost alternatives to current regimen. Could switch to a combination anticholinergic+LABA and reviewed formulary but none are covered. Sent Rx for Anoro Ellipta to see what cost would be. Called pharmacy to follow-up, cost is >$400 without pursuing a PA. At this time I don't see any other options to reduce cost other than switching  to QID Duoneb, however wife was reluctant to do this considering the amount of time it would take out of the day for administration. Will continue with current regimen.   RLS/neuropathy: needs improvement. Will continue to work on adjusting sleep schedule. Could consider trial of Lyrica as an alternative to gabapentin for RLS.   Depression/insomnia: needs improvement. Pt received information from [facility] today on sleep hygiene. Appears to get enough sleep total but sleep onset delayed due to late wake-up time. Discussed trying to transition to earlier bedtime and trial trazodone at higher dose 50mg to see if this helps reduce sleep onset time.       PLAN:                                                      Increase trazodone to 50mg 30-60 minutes prior to bedtime    I spent 60 minutes with this patient today.  All changes were made via collaborative practice agreement with Nidia Mitchell. A copy of the visit note was provided to the patient's primary care provider.     Will follow up in 1 month.    The patient was mailed a summary of these recommendations as an after visit summary.    Karla Palafox, PharmD, BCACP

## 2017-01-16 NOTE — PATIENT INSTRUCTIONS
Recommendations from today's MTM visit:                                                      1. We talked about alternatives to your breathing medications to try to reduce cost. Unfortunately the only option seems to be changing to a nebulizer medication that you would have to use 4 times a day.  We decided to continue with your current medications for now so you aren't tied to that nebulizer all day.     2. Please try to wake up earlier in the morning to make yourself more tired at night. We also increased trazodone to 50mg, which I would like you to take 30-60 minutes before bedtime.    Next MTM visit: 1 month    To schedule another MTM appointment, please call the clinic directly or you may call the MTM scheduling line at [phone] or toll-free at [phone].     My Clinical Pharmacist's contact information:                                                      It was a pleasure seeing you today!  Please feel free to contact me with any questions or concerns you have.      Karla Palafox, PharmD, BCACP     You may receive a survey about the MTM services you received.  I would appreciate your feedback to help me serve you better in the future. Please fill it out and return it when you can. Your comments will be anonymous.

## 2017-01-17 NOTE — TELEPHONE ENCOUNTER
Received call from carol Worthy's daughter. She states that she has concerns about medications being changed, and that she is not notified of medication changes.     Will route to triage.

## 2017-01-18 NOTE — TELEPHONE ENCOUNTER
Received call from daughter Zaynba yesterday afternoon to ask about med changes and communication. Pt did sign a release for communication with Stephanie at his last appt.     Stephanie is concerned about fall risk with the increased dose of trazodone. Stated pt had not seemed to have any effect thus far from low dose trazodone and would try this last dose increase before d/c and trying another therapy. Reviewed how best to communicate changes in medications with Stephanie. She asked for providers to attach notes to his med list that is kept with bag of medications and also call her after visits if possible. Encouraged MyChart but she does not have a computer. Reviewed upcoming appts with Stephanie in case she is able to be present for the appointments.    Also had a question on Perforomist, was picked up from pharmacy and wondering if this was replacing something. Reviewed chart, Brovana is no longer covered and Perforomist is the covered alternative. Will update med list.    Routed to PCP as ROBEL.     Karla Palafox, PharmD, BCACP

## 2017-01-18 NOTE — TELEPHONE ENCOUNTER
Spoke with spouse and informed her that her daughter contacted our clinic with concerns.  Spouse stated it was ok to talk with daughter.  She then stated information is written down for patient/spouse to follow but the daughter would also like a call after the visits.      Requested for spouse to make this connection with the daughter and then if the daughter has questions after this, she can feel free to call the clinic.  This will eliminate one step and have one contact with the family instead of several.  Instructed on how clinic has limited staff to make extra follow up calls, but reinforced if there are further questions beyond the visit she should feel free to call.      Spouse agreed with this plan.    Spouse then stated patient still having difficulty getting up in the morning.  She states with the increased trazodone dose he has been more fatigued in the morning and not wanting to get out of bed.  Reviewed patient's sleep habits.  She states he will appear tired so she will tell him to go to bed but he won't.  He ends up staying up until about midnight each night and he takes the trazodone at about 8:30 pm.  Instructed to have him try to go to bed even 30 minutes earlier for one week, then try another 30 minutes earlier the next week.  She states she will try but is unable to force him.      Parvin Cardenas RN

## 2017-01-19 NOTE — TELEPHONE ENCOUNTER
Received fax stating that PA for anoro ellipta has not yet been received.  Do not even see this medication on med list.    Formulary alternatives are arcapta neohaler and serevent diskus.    Patient has a [employer] plan and PA can be started by calling [phone]    Can we check on status?

## 2017-01-20 NOTE — TELEPHONE ENCOUNTER
PA initiated for Kaylene Hdz Blst w/Dev via [email or web address]. Pending approval.     Cierra Joseph MA

## 2017-01-20 NOTE — TELEPHONE ENCOUNTER
It appears another PA was sent in 1/20/17.    Will route to PharmD. Do not see anoro ellipta on pt's med list. Below are covered alternatives.

## 2017-01-21 NOTE — TELEPHONE ENCOUNTER
This was ordered as a possible cost-reducing option. Neither of the formulary alternatives would be cost-saving as they are not combination inhalers. I was looking for a LABA-anticholinergic combo inhaler.    Unnecessary to further pursue this PA>    Karla Palafox, OmarD, BCACP

## 2017-01-23 NOTE — PROGRESS NOTES
"Clinic Care Coordination Contact  OUTREACH    Referral Information:  Referral Source: PCP  Reason for Contact: Referral to address in-home supports and services, including PCA.      [place] Utilization:   ED Visits in last year: 2  Hospital visits in last year: 1  Last PCP appointment: 01/04/17  Missed Appointments: 0     Clinical Concerns:  Current Medical Concerns: Karan would like to have a flu shot and a hospice evaluation. Will route to PCP and RN pool.   Current Behavioral Concerns: Not discussed at this visit.    Education Provided to patient: N/A      Clinical Pathway: None    Medication Management:  Britt reports her daughter Zaynab pre-packs his medications every week.      Functional Status:  Mobility Status: Dependent/Assisted by Another  Transportation: Britt drives her car, but does not take Karan with her. Karan does not have specialized transportation and declined further information on this.    Karan and Britt report they are unsure what services would be the most helpful for them. Karan stated he does not want anyone he does not know to help him with personal cares (bathing, dressing, etc.). He declined PCA services and Britt declined homemaking services. Britt stated, \"I get tired, but I will do everything around here.\"      Psychosocial:  Current living arrangement:: I live in a private home with spouse  Financial/Insurance: Karan had a bill from [employer] from diagnostic imaging from 10/27/16 for 247.00. He was angry the provider did not tell him he would get a bill for this services and stated he would not pay it. The bill indicated the claim was denied because the \"Patient cannot be identified.\" SW assisted Karan in calling patient services to clarify the insurance. This bill will be resubmitted to insurance with the correct insurance information.\  Prema stated they own their house and have some savings, but do not want to use this for cares. There appears to be some conflict with their " "children regarding finances.  Karan stated his children want him and Britt to sell their home and move into assisted living. Karan states he does not want to move and wants to \"die in this house.\" Discussed the POLST and goals of care. Karan stated he is not sure what he wants his goals of care to be. SW discussed hospice services and palliative care. Karan was interested in getting more information about these services and would like a hospice evaluation.     SW reviewed upcoming appointments and assisted Britt in writing them on a calendar.     Resources and Interventions:  Current Resources: None      Goals:   Barriers: Karan is not open to assistance from people he does not know. He does not want to spend money on services and has limited insight into his medical needs and Britt's functional ability. There appears to be conflict between Karan and his adult children about finances and his ability to live in independent living. Britt is his main caretaker and has her own medical issues.   Strengths: Karan has strong beliefs about what his care should look like. He has a supportive wife and children who are willing to help.  Patient/Caregiver understanding: Karan indicated he understood and agreed with the current plan of care.      Upcoming appointment: 02/10/17     Plan: MADDISON will call Zaynab for collateral information on services, supports, and a plan of care. Routing to PCP and RN pool for hospice evaluation and flu shot. MADDISON will follow up at next appointment with PCP on 2/10/16.     ELIZABETH Velasquez  Social Work Care Coordinator  [facility], Mobile Team      "

## 2017-01-23 NOTE — PROGRESS NOTES
Clinic Care Coordination Contact  OUTREACH    Referral Information:  Referral Source: PCP  Reason for Contact: ***      [place] Utilization:   ED Visits in last year: 2  Hospital visits in last year: 1  Last PCP appointment: 01/04/17  Missed Appointments: 0     Clinical Concerns:  Current Medical Concerns: ***    Current Behavioral Concerns: ***    Education Provided to patient: ***      Clinical Pathway: None    Medication Management:  ***     Functional Status:  Mobility Status: Dependent/Assisted by Another  Transportation: ***     Psychosocial:  Current living arrangement:: I live in a private home with spouse  Financial/Insurance: ***  ***     Resources and Interventions:  Current Resources:  ;          Goals:   Barriers: ***  Strengths: ***  Patient/Caregiver understanding: ***     Upcoming appointment: 02/10/17     Plan: ***

## 2017-01-24 NOTE — TELEPHONE ENCOUNTER
"Received a call from Karan's daughter Zaynab and she is very frustrated and upset with the home care.  She has a ton of questions as to why the home care was chosen, how it was chosen and then what her expectations are of them.  She currently sets up his meds and there was a change of medication d/t ins issues.  They changed the medication, however, she was never notified so when she saw the new med, she was concerned as to how it changed.  She has spoken to Nidia Mcbride and Karla who come out to the home and is very disappointed on how they have handled her concerns.  She was receiving a written note from them for report so she asked if they could please call her and the response was \"if we have time\".  She was hoping Karan could come back here for care, however I did say that he is complex and that is why Home care became involved.  She really just needed to vent and ask if it was appropriate to discuss her frustrations with the home care.  I listened and let her know that her frustration is valid and she has every right to have a sit down to discuss her concerns etc.  She would like a call back from you, however she works tomorrow evening so was hoping you could call tomorrow during the day or Thursday during the day to answer some questions.  She is having a bit of trouble getting information from her mother as she gets confused and then Zaynab is confused and it just goes round and round.    Zaynab's phone #[phone]  "

## 2017-01-26 NOTE — TELEPHONE ENCOUNTER
"29 minute conversation with Zaynab, daughter voicing her frustrations with [facility].  She feels the transition is not going smoothly at all.  She is expecting a call when changes are made especially with medications as she sets up the meds.  She has spoken to a nurse at the [facility] who told her, \"it is the Complex Care Clinic's job to contact the daughter when things change.\"  Daughter is not currently listed as emergency contact.      Daughter kept repeating her displeasure with how this clinic runs and expects calls back from the provider, not the nurse.  She also kept repeating how well she likes the [facility].  Informed her that they can always return back to their former clinic if this is not the best fit for them.  She did not like this response stating it is very difficult to get the patient into the clinic due to the oxygen and his mobility.  She also continued to repeat how her parents were getting more forgetful and she can't rely on them giving her any of the information from the visits.      Daughter stated she was still waiting on 3 refills from [facility] but they keep going to the previous provider, yet could not tell RN what meds she had reordered.  When asked if she had contacted [facility] to inform them that the provider had changed, she stated she thought she was going to get help with everything and why was she responsible for this?  Instructed her that when patient's change providers, it is often up to the patient/family to contact the pharmacy with that change.  She did not agree with this.  RN told her that she would contact [facility] and let them know that the provider had changed.  Spoke with Ana at [facility] and informed of change.  Ana had sent to [facility] the refill requests for gabapentin, omeprazole and tylenol.  Gave verbal ok to fill all but the tylenol as this is not on the current med list.  Zaynab also was talking about a sleep med claiming the dose on " the bottle was different from the order and she could have overdosed the patient.    Had to repeat to her that it would be best if she could attend the provider visits so her concerns could be discussed.  She stated she is never called about the appointments.  Again, instructed her that we call the patient with a reminder and often the provider leave information at visits with the next appointment and any changes made during the visit.      Zaynab stated our phone conversation was not very helpful but agreed to be at the next provider visit.  She is requesting to make sure there will be plenty of time to discuss her concerns.      Routing to provider as FYI.  Recommend having [facility] at visit also.    Parvin Cardenas RN

## 2017-01-26 NOTE — TELEPHONE ENCOUNTER
"At each encounter, I leave a note for the daughter regarding any medication changes as well to facilitate medication set up and administration.   At both appointments that [facility] and I have had, we've discussed the need for more support in the home, which patient and wife continue to decline saying they do not want to spend their money on services. SW has been involved in care.     It is unclear what role the family dynamic plays in the primary care team's \"communicaiton issues\".   "

## 2017-01-26 NOTE — TELEPHONE ENCOUNTER
Dtr having some concerns regarding CCC not running smoothly. She would like a return phone call to discuss.    Cierra Joseph MA

## 2017-01-26 NOTE — TELEPHONE ENCOUNTER
Pt daughter Arianda expresses frustration with regard to communication from mobile complex care clinic- states she is person who sets up medications and is not being informed of changes    I recommend she set up meeting with complex care group.  I will also ask Meli(clinical coordinator) and possibly Andree (MTM) to contact them and make sure they address concerns of daughter ariadna-I think we just need to know who is the contact for this family and who needs to know what

## 2017-01-26 NOTE — TELEPHONE ENCOUNTER
The patient asked Lindsay Hooper at their visit if he could have a flu shot and a hospice evaluation.    Pended hospice eval for Hilaria if there is an appropriate diagnosis. If pt is admitted to hospice, they could give him a flu shot.    Jocelyn Mireles RN

## 2017-01-26 NOTE — Clinical Note
Health Care Home - Access Care Plan    About Me  Patient Name:  Karan Vaz    YOB: 1932  Age:                            84 year old   Dai MRN:         [id number] Telephone Information:     Home Phone [phone]   Mobile none       Address:    [address] [address] Email address:  No e-mail address on record      Emergency Contact(s)  Name Relationship Lgl Grd Work Phone Home Phone Mobile Phone   1. GALI VAZ Spouse  none [phone] none   2. EPHRAIM CALVILLO Daughter Yes none [phone]   3. PRANAY VAZ Son Yes none [phone]             Health Maintenance:      My Access Plan  Medical Emergency 911   Questions or concerns during clinic hours Primary Clinic Line, I will call the clinic directly: Primary Clinic: [facility]- [phone]   24 Hour Appointment Line [phone] or  [phone] ([phone])  (toll free)   24 Hour Nurse Line [phone] (toll free)   Questions or concerns outside clinic hours 24 Hour Appointment Line, I will call the after-hours on-call line:   [facility] [phone] or [phone] ([phone]) (toll-free)   Preferred Urgent Care     Preferred Hospital     Preferred Pharmacy [id number] [place], [place] - [address] Behavioral Health Crisis Line Crisis Connection, [phone] or [phone]     My Care Team Members  Patient Care Team       Relationship Specialty Notifications Start End    Nidia Mitchell APRN CNP PCP - General Nurse Practitioner - Adult Health  12/1/16     Phone: [phone] Fax: [phone]          CLINICS INT PRIM CARE [address] [facility] [address] [facility] [address]    Lindsay Hooper Clinic Care Coordinator  Admissions 1/23/17     Phone: [phone] Fax: [phone]            My Medical and Care Information  Problem List   Patient Active Problem List   Diagnosis     Myoclonus     History of peptic ulcer  disease     elastofibroma thoracic wall, benign     Restless legs syndrome (RLS)     Sensory neuropathy (H)     Health Care Home     Hypertrophy of prostate with urinary retention     Carpal tunnel syndrome     Glaucoma     Degeneration of cervical intervertebral disc     Anal sphincter incompetence     ACP (advance care planning)     Primary insomnia     Panlobular emphysema (H)     Primary osteoarthritis of right shoulder     Bilateral edema of lower extremity     Essential hypertension     Acute on chronic respiratory failure (H)     Acute on chronic diastolic congestive heart failure (H)     Dysphagia     Gastroesophageal reflux disease, esophagitis presence not specified     Major depressive disorder, recurrent episode, moderate (H)      Current Medications and Allergies:  See printed Medication Report

## 2017-01-26 NOTE — TELEPHONE ENCOUNTER
Received call from Meli/[facility] clinic care coordinator regarding below information and Meli reached out to this writer since patient's insurance is [facility].  Discussed with Meli that this writer will contact Kelly Hooper who is the  care coordinator for complex [place] care clinic to discuss Zaynab's concerns.      Contacted Kelly Hooper/ care coordinator complex care clinic ([phone]) who reported that she reviewed complex care clinic RN note from telephone contact with Zaynab from earlier today.  Kelly had face-to-face visit with patient and patient's wife on 1/23/17 and was planning to contact dtr, Zaynab, today to discuss that home visit on 1/23/17 and possible needed resources for patient and wife.  Kelly was also routed below information so she can continue to address concerns with Zaynab regarding communication with complex care clinic.  Kelly encouraged that if Zaynab contacts [facility], Kelly is comfortable with reminding Zaynab to contact Kelly at [phone] since she is the care coordinator for complex care clinic and will assist with resolving issues/concerns for patient.      Patient has provider visit with complex care clinic on 2/10/17 and MTM on 3/1/17.     Neda Ibrahim, RN   FPA [facility]   [phone]  January 26, 2017

## 2017-01-30 NOTE — TELEPHONE ENCOUNTER
Lung function is quite poor and would likely be a candidate for hospice, however without any note stating he has had an acute worsening, seems like the order could wait for PCP to return next week.   Could RN please contact pt for details on symptoms and if he is comfortable with waiting to address this next week? Would be best to discuss with PCP at upcoming appt on 2/10 if able to wait.    Karla Palafox, OmarD, BCACP

## 2017-01-30 NOTE — TELEPHONE ENCOUNTER
The patient requested more information on hospice services generally. I agree with Karla's note that it seems it could wait until next week. Due to the ongoing communication issues with the daughter, it may be beneficial if the RN can call to make sure the patient is comfortable with waiting.     ELIZABETH Velasquez  Social Work Care Coordinator  [facility] Care [facility], Mobile Team

## 2017-01-30 NOTE — TELEPHONE ENCOUNTER
I do not know this patient and PCP is gone this week.  Can this decision wait until she returns or can PharmD or  weigh in please.  Valerie Orlando [facility]

## 2017-01-31 NOTE — TELEPHONE ENCOUNTER
If the patient simply wanted more information about hospice, I will defer to Hilaria at her visit with him on 2/10. And unsure if his daughter is aware of his interest in hospice.

## 2017-02-06 NOTE — TELEPHONE ENCOUNTER
Pharmacy sent us a refill request for the following meds:    Pending Prescriptions:                       Disp   Refills    torsemide (DEMADEX) 10 MG tablet          30 tab*1            Sig: Take 1 tablet (10 mg) by mouth daily    I am sending this to the patient's PCP Nidia Mitchell to refill.    Veronika Granger [facility]

## 2017-02-07 PROBLEM — R26.9 ABNORMALITY OF GAIT AND MOBILITY: Status: ACTIVE | Noted: 2017-01-01

## 2017-02-08 NOTE — PROGRESS NOTES
"Clinic Care Coordination Contact  Care Team Conversations    Lengthy conversation with Karan's daughter Zaynab regarding services and supports in an effort to coordinate care. Karan and Britt have no services in the home, which is of concern to Zaynab. She reported assisting her parents regularly but, \"is not involved in the day to day.\" She stated her parents are very private and do not readily share information with her. This increasingly concerns her as she believes Britt's memory is getting worse. Karan had lifeline about a year ago, but cancelled it after one month. Zaynab stated they discussed longterm and hospice one year ago but it was \"not pushed\" because her parents are able to maintain their house. These options have not been discussed further as, \"My parents resist me at every corner.\" Zaynab is frustrated with her parents and stated they consistently decline help with services, cares, and help in the home. Zaynab would like to have a contingency plan in place or to discuss her parents wishes should they be unable to care for themselves at home, but stated her parents \"flat out refuse to talk about it with me.\"   MADDISON and Zaynab discussed self-determination and the patient's right to decline services versus health and safety. At this time, Karan is able to get his needs met in his home.   Zaynab agreed to attend the next PCP appointment on 2/10/17 for ongoing conversation regarding coordination of care.    Zaynab went on to express concerns regarding the [facility]. She spoke at length of concerns she previously expressed with Jay Cardenas RN. She sets up medications weekly and is concerned she is not informed of medication changes. Zaynab requested PCP contact her directly after appointments to discuss medication changes. She expressed being unsure if the Complex Care Clinic is \"a good fit\" for her parents and stated she was considering talking to Karan about switching back to his previous clinic. " Zaynab will attend the next appointment with PCP, [facility], and SW to discuss Complex Care services, expectations, and communication with the care team.    ELIZABETH Velasquez  Social Work Care Coordinator  [facility], Mobile Team

## 2017-02-09 NOTE — PROGRESS NOTES
Clinic Care Coordination Contact  OUTREACH    Referral Information:  Referral Source: PCP  Reason for Contact: Care conference to discuss communication.       Universal Utilization:   ED Visits in last year: 2  Hospital visits in last year: 1  Last PCP appointment: 02/10/17  Missed Appointments: 0    Clinical Concerns:  Current Medical Concerns: Medical concerns discussed with Nimisha Wilson CNP. See PCP note dated 2/10/17.    Current Behavioral Concerns: Behavioral health concerns addressed with Roshan Powell [facility]. See [facility] note dated 2/10/17.     Education Provided to patient: N/A   Clinical Pathway Name: None  Clinical Pathway: None    Medication Management:  See psychosocial status.      Functional Status:  Mobility Status: Dependent/Assisted by Another  Equipment Currently Used at Home: walker, standard, respiratory supplies, oxygen      Psychosocial:  Current living arrangement:: I live in a private home with spouse    Meeting with Nimisha Wilson CNP, [facility], Karan De La Torre, and Zaynab to discuss communication expectations between Zaynab (who sets up the medications) and the [facility]. Zaynab expressed concerns with not knowing when medication changes are happening or what the medication changes are. Zaynab agreed to leave a communication notebook in the kitchen so she can write down concerns for the appointments and providers can write any changes in the notebook.   Discussed additional in-home support for Estrella. Britt and Karan maintained they do not want assistance with personal cares and household work. Britt did say she might be interested in having respite care in the home so she can leave the house to run errands. In a previous visit, Karan expressed interest in hospice services, but was having difficulty understanding hospice after multiple attempts by  to explain hospice care. Nimisha Wilson stated she would discuss hospice and palliative care with patient and make a referral  for an informational session if the patient agrees.     Resources and Interventions:  Current Resources: None  Advanced Care Plans/Directives on file:: Yes     Goals:   Barriers: Karan is not open to assistance from people he does not know. He does not want to spend money on services and has limited insight into his medical needs and Britt's functional ability. There appears to be conflict between Karan and his adult children about finances and his ability to live in independent living. Britt is his main caretaker and has her own medical issues.    Strengths: Karan has strong beliefs about what his care should look like. He has a supportive wife and children who are willing to help.  Patient/Caregiver understanding: Karan indicated he understood and agreed with the current plan of care.   Frequency of Care Coordination: Monthly  Upcoming appointment: 03/10/17     Plan: M[facility]DIS[facility] will mail information on PCA services and companionship resources to Zaynab. Zaynab will call MADDISON with any questions or concerns. Return visit with PCP on 3/10/17 at 9:45 am.     ELIZABETH Velasquez  Social Work Care Coordinator  [facility], Mobile Team

## 2017-02-10 NOTE — PROGRESS NOTES
[facility] - [facility] Mobile  February 10, 2017      Behavioral Health Clinician Progress Note    Patient Name: Karan Vaz           Service Type: Individual      Service Location:   Face to Face in Home / Community     Session Start Time: 11:00 a.m.  Session End Time: 11:50 a.m.      Session Length: 38 - 52      Attendees: Patient, Spouse / Significant Other and patient's daughter, and EMI Dalal, CNP, in lieu of PCP, [facility] Care Coordinator ELIZABETH Velasquez    Visit Activities (Refresh list every visit): [facility]    Diagnostic Assessment Date: 12/8/16  Treatment Plan Review Date: 4/4/17  See Flowsheets for today's PHQ-9 and SIDNEY-7 results  Previous PHQ-9:   PHQ-9 SCORE 7/2/2012 10/12/2015 10/25/2016   Total Score 19 - -   Total Score - 18 22     Previous SIDNEY-7:   SIDNEY-7 SCORE 10/25/2016   Total Score 18       TANNER LEVEL:  TANNER Score (Last Two) 3/25/2016   TANNER Raw Score 0   Activation Score 0   TANNER Level 1       DATA  Extended Session (60+ minutes): No  Interactive Complexity: No  Crisis: No    Treatment Objective(s) Addressed in This Session:  Target Behavior(s): disease management/lifestyle changes depression    Depressed Mood: Increase interest, engagement, and pleasure in doing things  Decrease frequency and intensity of feeling down, depressed, hopeless  Improve quantity and quality of night time sleep / decrease daytime naps  Feel less tired and more energy during the day   Identify negative self-talk and behaviors: challenge core beliefs, myths, and actions  Decrease thoughts that you'd be better off dead or of suicide / self-harm    Current Stressors / Issues:  [facility] co-visit with patient, his wife, his daughter, PCP, and [facility] Care Coordinator in patient's home. This is a routine follow up visit. Patient's daughter, Zaynab, is present today to discuss her concerns about the transition to [facility], as she is in charge of setting up patient's medications. Zaynab reports that her primary concern  "is communication regarding any changes in medication or treatments, as she is not confident that her parents understand everything or are able to properly communicate with her. Zaynab will keep a notebook to record changes and instructions, and states this should be sufficiently helpful.  Discussed future in-home care options as patient's wife is struggling at times to maintain ADLs, bathing, etc, and cannot leave him alone. Patient's wife states it is important to her to be able to get out of the house once a day to run errands. They have had problems with in-home services in the past, though are willing to consider options as long as services are affordable or covered by insurance.  Patient reports no mood changes, and that he is trying to wake and stay awake earlier in the morning - that he wakes between 9:00/10:00 and doesn't go back to sleep. Patient reports he is sleeping \"a little better\", though continues to wake frequently at night.  [facility] follow up at next PCP visit. Advised that [facility] services are available as needed and that patient can call the clinic to schedule a visit.    Progress on Treatment Objective(s) / Homework:  New Objective established this session - PREPARATION (Decided to change - considering how); Intervened by negotiating a change plan and determining options / strategies for behavior change, identifying triggers, exploring social supports, and working towards setting a date to begin behavior change    Motivational Interviewing    MI Intervention: Co-Developed Goal: review/update psychiatric medications, Expressed Empathy/Understanding, Supported Autonomy, Collaboration, Evocation, Permission to raise concern or advise, Open-ended questions and Reflections: simple and complex     Change Talk Expressed by the Patient: Desire to change Ability to change Reasons to change Need to change    Provider Response to Change Talk: E - Evoked more info from patient about behavior change, A - Affirmed " patient's thoughts, decisions, or attempts at behavior change, R - Reflected patient's change talk and S - Summarized patient's change talk statements      Care Plan review completed: No    Medication Review:  No changes to current psychiatric medication(s)     Medication Compliance:  Yes    Changes in Health Issues:  Please see medical chart.    Chemical Use Review:   Substance Use: Chemical use reviewed, no active concerns identified      Tobacco Use: No current tobacco use.      Assessment: Current Emotional / Mental Status (status of significant symptoms):  Risk status (Self / Other harm or suicidal ideation)  Patient denies a history of suicidal ideation, suicide attempts, self-injurious behavior, homicidal ideation, homicidal behavior and and other safety concerns  Patient denies current fears or concerns for personal safety.  Patient denies current or recent suicidal ideation or behaviors.  Patient denies current or recent homicidal ideation or behaviors.  Patient denies current or recent self injurious behavior or ideation.  Patient denies other safety concerns.  A safety and risk management plan has not been developed at this time, however patient was encouraged to call [place] [phone] should there be a change in any of these risk factors.  Patient endorsed passive thoughts of being better off dead, as he is frustrated by loss of independence and reliance on his wife.    Appearance:   Appropriate   Eye Contact:   Good   Psychomotor Behavior: Normal   Attitude:   Cooperative   Orientation:   All  Speech   Rate / Production: Normal    Volume:  Normal   Mood:    Anxious  Normal  Affect:    Appropriate   Thought Content:  Clear   Thought Form:  Coherent  Logical   Insight:    Fair     Diagnoses:  1. Major depressive disorder, recurrent episode, moderate (H)        Collateral Reports Completed:  Routed note to PCP    Plan: (Homework, other):  Patient was given information about behavioral services and will  schedule a follow up visit as needed. He was also given information about mental health symptoms and treatment options , deep Breathing Strategies to practice when experiencing anxiety and depression and sleep hygiene strategies.  CD Recommendations: No indications of CD issues.       Roshan Powell [facility]                                                    Treatment Plan    Client's Name: Karan Vaz  YOB: 1932    Date: 1/12/17    DSM-V Diagnoses: 296.32 Major Depressive Disorder, Recurrent Episode, Moderate With anxious distress  Psychosocial / Contextual Factors: patient lives at home with his wife, who is also his primary caregiver. It is increasingly difficult for her to care for him, and they are open to home care if it's covered by insurance. Patient's primary goal is to remain in his home.  WHODAS: 58    Referral / Collaboration:  Was/were discussed and client will pursue: [facility] referral    Anticipated number of session or this episode of care: 12-24 per year      MeasurableTreatment Goal(s) related to diagnosis / functional impairment(s)  Goal 1: Client will manage anxiety and depression by taking medications as prescribed and practicing adaptive coping skills to manage SOB.    I will know I've met my goal when I can sleep and not worry so much.      Objective #A (Client Action)    Client will Decrease frequency and intensity of feeling down, depressed, hopeless  Improve quantity and quality of night time sleep / decrease daytime naps  Feel less tired and more energy during the day   Identify negative self-talk and behaviors: challenge core beliefs, myths, and actions  Decrease thoughts that you'd be better off dead or of suicide / self-harm.  Status: New - Date: 1/12/17     Intervention(s)  Therapist will teach mindful breathing and meditation to induce relaxation.    Objective #B  Client will Increase interest, engagement, and pleasure in doing things  Decrease frequency and  intensity of feeling down, depressed, hopeless  Feel less tired and more energy during the day   Identify negative self-talk and behaviors: challenge core beliefs, myths, and actions.  Status: New - Date: 1/12/17     Intervention(s)  Therapist will provide educational materials on sleep hygiene.    Client has reviewed and agreed to the above plan.      Roshan Powell January 12, 2017

## 2017-02-10 NOTE — TELEPHONE ENCOUNTER
Spoke with pt's dtr, Zaynab, and advised of the changes and recommendations made at visit today, per Nimisha Wilson NP.    1. Tylenol - increase from QD to BID  2. Restart Lidocaine ointment TID  3. Senokot-S - scheduled 1 tab BID  4. Encourage pt to take Fibercon  5. Elevate legs 15 mins TID  6. Hospice eval will be ordered and advised Zaynab to ask her mom when they are coming so she can be there if they want. Advised they usually make a visit within 48 hours of receiving a referral.  7. Advised that AVSs will be mailed to Zaynab going forward, which she thinks is a good idea. Also advised that the provider would write in the notebook at pt's home if there are any med changes.    Zaynab said she is almost positive that the pt has been taking Senokot-S BID. She said she will check when she is at her parents' home again and will let me know if there is another med he has been taking for constipation.    Zaynab was thankful for the call.    Jocelyn Mireles RN

## 2017-02-10 NOTE — PROGRESS NOTES
SUBJECTIVE:                                                    Karan Vaz is a 84 year old male with a complex PMH significant for COPD, CHF, sensory neuropathy, MDD, RLS, is being followed by the complex care program and is seen in the home today for the following health issues:      Encounter held with patient and wife and daughter     Discussed extra home services, PCA, not interested in HHA services, daughter and son setting up meds maybe interested in switching to [facility] order pharmacy, wife wants to go out everyday but they don't want to pay for additional services, no longer has a lifeline but considering getting another one,     Long discussion with wife and patient about Hospice and they are interested in pursuing.  I will put in a referral     Daughter has many issues with communication, not getting call backs, medication miscommunication, discussed CCC system and having a notebook at the home for written communication    NOSE BLEED  Duration of complaint: started last week, called and got through to [facility] and was told to go into the ED if it didn't get better, confused about who to call, discussed sx relief, pinch nose and use an ice pack on the back of the neck      Constipation  Duration of complaint: chronic, not taking fibercon regularly, doesn't like miralax, not taking Senakot-S regularly, hard frequent stools, discussed taking fibercon daily and Senakot-S 2x daily        Heart Failure Follow-up    Symptoms:    Shortness of breath: happens with exertion and at rest- stable oxygen dependent 3L    Lower extremity edema: worse at night, not elevating his legs or wearing his FABIAN stockings, refuses, long discussion about elevating legs for at least 15 minutes 3x daily and getting tube socks .     Chest pain: No    Using more pillows than normal: No    Cough at night: No    Weight:    Checking weight daily: No    Weight change: none    Cardiology visits, ER/UC, or hospital admissions since last  visit: None    Medication side effects: none     Depression Followup    Status since last visit: Stable.     See PHQ-9 for current symptoms.  Other associated symptoms: None    Complicating factors:   Significant life event:  Yes-  declining health    Current substance abuse:  None  Anxiety or Panic symptoms:  No    PHQ-9  English PHQ-9   Any Language          Left sided ear/jaw  pain  Ongoing for months. Was seen by ENT and had biopsy returned negative for dysplasia and or malignancy. All imaging returned negative.  Was felt that it was r/t to TMJ arthralgia. Was recommended to have his top dentures properly fitted. Dentures were realigned. Discussed using lidocaine cream 3x daily for pain relief and to try padding the back of his ear where his O2 tubing rubs      CT MAXILLOFACIAL WITHOUT CONTRAST 10/11/2016 6:01 PM       HISTORY: Left posterior ear pain.     TECHNIQUE: Axial images were obtained through the maxillofacial region  without contrast. Radiation dose for this scan was reduced using  automated exposure control, adjustment of the mA and/or kV according  to patient size, or iterative reconstruction technique. Coronal and  sagittal reconstructions were also acquired.     FINDINGS: The visualized paranasal sinuses are clear. Visualized left  mastoid air cells are clear. Left middle ear cavity and right middle  ear cavity are clear. Vascular calcifications are noted. There is no  evidence for fracture. Degenerative changes are seen in the upper  cervical spine. Soft tissues are unremarkable including the  nasopharynx.     IMPRESSION: Negative maxillofacial CT    Insomnia  Currently taking 50mg of Trazodone daily with good results       COPD Follow-Up    Symptoms are currently: stable    Current fatigue or dyspnea with ambulation: yes but at baseline for him     Shortness of breath: stable    Increased or change in Cough/Sputum: No    Fever(s): No    Baseline ambulation without stopping to rest 10 feet. Able  to walk up 0 flights of stairs without stopping to rest.    Any ER/UC or hospital admissions since your last visit? No     History   Smoking status     Former Smoker     Quit date: 01/01/1990   Smokeless tobacco     Never Used     Comment: 20 years ago     No results found for this basename: FEV1, TOR0DKI       Amount of exercise or physical activity: able to walk a few feet around house with walker     Problems taking medications regularly: No daughter sets up his medications with a radha box.     Medication side effects: none    Diet: regular (no restrictions)    Self Care: with help from wife  Activity/mobility:walks with walker.   Nutrition: eats what he wants. Really doesn't monitor his diet  Housing: lives at home with wife.   Significant life event in past year: physical health decline  :     Problem list and histories reviewed & adjusted, as indicated.  Additional history: as documented    Patient Active Problem List   Diagnosis     Myoclonus     History of peptic ulcer disease     elastofibroma thoracic wall, benign     Restless legs syndrome (RLS)     Sensory neuropathy (H)     Health Care Home     Hypertrophy of prostate with urinary retention     Carpal tunnel syndrome     Glaucoma     Degeneration of cervical intervertebral disc     Anal sphincter incompetence     ACP (advance care planning)     Primary insomnia     Panlobular emphysema (H)     Primary osteoarthritis of right shoulder     Bilateral edema of lower extremity     Essential hypertension     Acute on chronic respiratory failure (H)     Acute on chronic diastolic congestive heart failure (H)     Dysphagia     Gastroesophageal reflux disease, esophagitis presence not specified     Major depressive disorder, recurrent episode, moderate (H)     Abnormality of gait and mobility     Past Surgical History   Procedure Laterality Date     C shoulder surg proc unlisted       Shoulder     C shoulder surg proc unlisted       Shoulder     C shoulder surg  proc unlisted       Shoulder     Hc revise median n/carpal tunnel surg  7/01/02     simonet, left     Hc revise median n/carpal tunnel surg  09/24/02     Dr Montiel left     Arthroplasty hip bilateral       Arthroscopy shoulder distal clavicle repair  5/30/2012     Procedure:ARTHROSCOPY SHOULDER DISTAL CLAVICLE RESECTION; Left shoulder arthroscopy, Bicep tenontomy, debridement of partial thickness cuff tear, Subacromial Decompression.; Surgeon:MONIE MENDIETA; Location: OR     Prost. microwave thermotx  2012     dr Sin      Sigmoidoscopy flexible N/A 2/16/2016     Procedure: SIGMOIDOSCOPY FLEXIBLE;  Surgeon: Ky Small MD;  Location:  GI       Social History   Substance Use Topics     Smoking status: Former Smoker     Quit date: 01/01/1990     Smokeless tobacco: Never Used      Comment: 20 years ago     Alcohol Use: No     Family History   Problem Relation Age of Onset     Colon Cancer No family hx of          Current Outpatient Prescriptions   Medication Sig Dispense Refill     omeprazole (PRILOSEC) 20 MG CR capsule TAKE ONE CAPSULE BY MOUTH ONE TIME DAILY  30 capsule 0     MAPAP 500 MG tablet TAKE ONE TABLET BY MOUTH EVERY FOUR HOURS AS NEEDED AND TAKE 2 TABLET BY MOUTH EVERY NIGHT AT BEDTIME.  GENERIC FOR TYLENOL (ACETAMINOPHEN) 240 tablet 0     gabapentin (NEURONTIN) 300 MG capsule TAKE TWO CAPSULES BY MOUTH TWICE DAILY  120 capsule 0     traZODone (DESYREL) 50 MG tablet Take 50mg at bedtime. 90 tablet 1     torsemide (DEMADEX) 10 MG tablet Take 1 tablet (10 mg) by mouth daily 30 tablet 1     formoterol (PERFOROMIST) 20 MCG/2ML neb solution Take 2 mLs (20 mcg) by nebulization every 12 hours 2 mL 1     SPIRIVA HANDIHALER 18 MCG capsule INHALE 1 CAPSULE (18 MCG) BY INHALATION ROUTE ONCE DAILY 30 capsule 0     DULoxetine (CYMBALTA) 60 MG EC capsule Take 1 capsule (60 mg) by mouth daily 90 capsule 0     budesonide (PULMICORT) 0.5 MG/2ML neb solution Take 2 mLs (0.5 mg) by nebulization 2 times daily 60 mL  3     metoprolol (LOPRESSOR) 25 MG tablet TAKE ONE-HALF TABLET BY MOUTH 2 TIMES DAY 90 tablet 0     buPROPion (WELLBUTRIN XL) 150 MG 24 hr tablet TAKE ONE TABLET BY MOUTH EVERY NIGHT AT BEDTIME 90 tablet 0     tamsulosin (FLOMAX) 0.4 MG capsule TAKE ONE CAPSULE BY MOUTH EVERY DAY 90 capsule 0     montelukast (SINGULAIR) 10 MG tablet TAKE ONE TABLET BY MOUTH EVERY NIGHT AT BEDTIME 90 tablet 0     albuterol (PROAIR HFA, PROVENTIL HFA, VENTOLIN HFA) 108 (90 BASE) MCG/ACT inhaler Inhale 2 puffs into the lungs every 6 hours as needed for shortness of breath / dyspnea 1 Inhaler 6     Simethicone 180 MG CAPS Take 1 capsule by mouth 2 times daily 60 capsule      lidocaine (XYLOCAINE) 5 % ointment Apply topically as needed for moderate pain 50 g 1     senna-docusate (SENOKOT-S;PERICOLACE) 8.6-50 MG per tablet Take 1 tablet by mouth 2 times daily as needed for constipation 100 tablet 3     albuterol (2.5 MG/3ML) 0.083% nebulizer solution INHALE 1 VIAL BY NEBULIZATION EVERY 2 HOURS AS NEEDED FOR DYSPNEA  540 mL 0     order for DME Equipment being ordered: Oxygen Titration visit [facility]  Fax # [phone]  0     Allergies   Allergen Reactions     No Known Allergies      Recent Labs   Lab Test  10/11/16   1620  04/18/16   0600   02/21/16   0450   02/19/16   0630   05/14/14   1345   04/07/13   0553   06/23/11   0500 03/30/11   A1C   --    --    --    --    --   6.3*   --    --    --   5.4   --    --    --    LDL   --    --    --    --    --    --    --    --    --    --    --   132*   --    HDL   --    --    --    --    --    --    --    --    --    --    --   41   --    TRIG   --    --    --    --    --    --    --    --    --    --    --   195*   --    ALT  23   --    --   58   --   37   < >  22   --    --    < >   --    --    CR  0.94  0.74   < >  0.81   < >  0.74   < >  0.86   < >  0.72   < >   --    --    GFRESTIMATED  76  >90  Non  GFR Calc     < >  >90  Non  GFR Calc     < >   >90  Non  GFR Calc     < >  81   < >  >90   < >   --    --    GFRESTBLACK  >90   GFR Calc    >90   GFR Calc     < >  >90   GFR Calc     < >  >90   GFR Calc     < >   --    --   >90   < >   --    --    POTASSIUM  4.6  4.3   < >  4.0   < >  4.5   < >  4.9   < >  4.2   < >   --    --    TSH   --    --    --    --    --    --    --   1.15   --    --    --    --   0.86    < > = values in this interval not displayed.      BP Readings from Last 3 Encounters:   01/12/17 138/78   01/04/17 130/70   11/29/16 120/70    Wt Readings from Last 3 Encounters:   10/11/16 225 lb (102.059 kg)   10/11/16 226 lb (102.513 kg)   04/22/16 204 lb 12.8 oz (92.897 kg)            Labs reviewed in EP[facility]  Problem list, Medication list, Allergies, and Medical/Social/Surgical histories reviewed in [facility] and updated as appropriate.    ROS:  Constitutional, HEENT, cardiovascular, pulmonary, GI, , musculoskeletal, neuro, skin, endocrine and psych systems are negative, except as otherwise noted.    OBJECTIVE:                                                    /60  Pulse 80  Resp 18  SpO2 98%  There is no weight on file to calculate BMI.  GENERAL:  male, alert, sitting up at dining room  table, oxygen dependent, feet dependent, overweight,   HENT:Head-normocephalic without lesions. Left ear: auricle and pinna non tender, no erythema no palpable masses, tender to palpation of bone behind ear    RESP: lungs clear to auscultation - no rales, rhonchi or wheezes  CV: regular rate and rhythm, normal S1 S2, no S3 or S4, no murmur, click or rub, edema 1+ slight pitting  ABDOMEN: obese, soft, nontender, or masses and bowel sounds diminished  MS: no gross musculoskeletal defects noted, edema improved.  SKIN: no suspicious lesions or rashes  NEURO: Normal strength and tone, mentation intact and speech normal but very quiet   PSYCH:  disengaged, somewhat cooperative but  pleasant, poor concentration,     Diagnostic Test Results:  none      ASSESSMENT/PLAN:                                                    ASSESSMENT / PLAN:  (F33.1) Major depressive disorder, recurrent episode, moderate (H)  (primary encounter diagnosis)  Comment: stable on Wellbutrin XL  Plan: monitor    (I10) Essential hypertension  Comment: stable  Plan: no med changes    (J43.1) Panlobular emphysema (H)  Comment: Continues to decline  Plan: hospice order placed     (F51.01) Primary insomnia  Comment: states it is due to his restless legs  Plan: will increase his acetaminophen 1,000mg 2x daily    (I50.33) Acute on chronic diastolic congestive heart failure (H)  Comment: edema controled, SOB continues to limit his mobility, O2 continuous 3L  Plan: Continue regime, hospice referral placed     (R26.9) Abnormality of gait and mobility  Comment: unstable at risk for falling  Plan: discussed risks of leaving patient on his own when wife goes to the store, recommend hiring a volunteer to stay with him, and getting a lifeline,     SLOW TRANSIT CONSTIPATION: not taking fibercon regularly of Senakot-S, will start fibercon daily and Senakot S 2x daily    Patient Instructions   Please increase your acetaminophen to 1,000mg 2x daily instead of one    Please restart your lidocaine cream 3x daily as needed for pain    Please take your fibercon every mornig as ordered    Please take your Senakot-S 2x daily     I will order hospice for you and they will call you to set up an appt     Please elevate your legs when you are sitting up in your WC, please try and get some tube socks to wear when you are up    We will see you again in one month, 3/10/17      Nimisha WHITE  [facility]  Visit time 90   min with > than 50% of the time spent in counseling on: care coordination, CHF, emphysema, constipation, pain, edema  20 minutes spent discussing advanced care directives

## 2017-02-10 NOTE — PROGRESS NOTES
SUBJECTIVE:                                                    Karan Vaz is a 84 year old male with a complex PMH significant for COPD, CHF, sensory neuropathy, MDD, RLS, is being followed by the complex care program and is seen in the home today for the following health issues:      Encounter held with patient and wife and daughter     Discussed extra home services, PCA, not interested in HHA services, daughter and son setting up meds maybe interested in switching to [facility], wife wants to go out everyday but they don't want to pay for additional services, no longer has a lifeline but considering getting another one,     Daughter has many issues with communication, not getting call backs, medication miscommunication, discussed CCC system and having a notebook at the home for written communication    NOSE BLEED  Duration of complaint: started last week, called and got through to [facility] and was told to go into the ED if it didn't get better        Heart Failure Follow-up    Symptoms:    Shortness of breath: happens with exertion and at rest- stable oxygen dependent 3L    Lower extremity edema: worse at night, elevates legs and improves.     Chest pain: No    Using more pillows than normal: No    Cough at night: No    Weight:    Checking weight daily: No    Weight change: none    Cardiology visits, ER/UC, or hospital admissions since last visit: None    Medication side effects: none     Depression Followup    Status since last visit: Stable. Says that sleep again is his most concerning symptom.     See PHQ-9 for current symptoms.  Other associated symptoms: None    Complicating factors:   Significant life event:  Yes-  declining health    Current substance abuse:  None  Anxiety or Panic symptoms:  No    PHQ-9  English PHQ-9   Any Language          Left sided ear/jaw  pain  Ongoing for months. Was seen by ENT and had biopsy returned negative for dysplasia and or malignancy. All imaging returned  negative.  Was felt that it was r/t to TMJ arthralgia. Was recommended to have his top dentures properly fitted. Dentures were realigned and he was advised to do salt water rinses but pain remains the same. No new symptoms. Per wife patient has been taking 3-4 tablets of 500 mg tylenol at one time.     CT MAXILLOFACIAL WITHOUT CONTRAST 10/11/2016 6:01 PM       HISTORY: Left posterior ear pain.     TECHNIQUE: Axial images were obtained through the maxillofacial region  without contrast. Radiation dose for this scan was reduced using  automated exposure control, adjustment of the mA and/or kV according  to patient size, or iterative reconstruction technique. Coronal and  sagittal reconstructions were also acquired.     FINDINGS: The visualized paranasal sinuses are clear. Visualized left  mastoid air cells are clear. Left middle ear cavity and right middle  ear cavity are clear. Vascular calcifications are noted. There is no  evidence for fracture. Degenerative changes are seen in the upper  cervical spine. Soft tissues are unremarkable including the  nasopharynx.     IMPRESSION: Negative maxillofacial CT    Insomnia  Was started on Trazodone 25 mg HS      COPD Follow-Up    Symptoms are currently: stable    Current fatigue or dyspnea with ambulation: stable     Shortness of breath: stable    Increased or change in Cough/Sputum: No    Fever(s): No    Baseline ambulation without stopping to rest 0 feet. Able to walk up 0 flights of stairs without stopping to rest.    Any ER/UC or hospital admissions since your last visit? No     History   Smoking status     Former Smoker     Quit date: 01/01/1990   Smokeless tobacco     Never Used     Comment: 20 years ago     No results found for this basename: FEV1, ATH2ULO       Amount of exercise or physical activity: able to walk a few feet around house with walker     Problems taking medications regularly: No daughter sets up his medications with a radha box.     Medication side  effects: none    Diet: regular (no restrictions)    Self Care: with help from wife  Activity/mobility:walks with walker.   Nutrition: eats what he wants. Really doesn't monitor his diet  Housing: lives at home with wife.   Significant life event in past year: physical health decline  :     Problem list and histories reviewed & adjusted, as indicated.  Additional history: as documented    Patient Active Problem List   Diagnosis     Myoclonus     History of peptic ulcer disease     elastofibroma thoracic wall, benign     Restless legs syndrome (RLS)     Sensory neuropathy (H)     Health Care Home     Hypertrophy of prostate with urinary retention     Carpal tunnel syndrome     Glaucoma     Degeneration of cervical intervertebral disc     Anal sphincter incompetence     ACP (advance care planning)     Primary insomnia     Panlobular emphysema (H)     Primary osteoarthritis of right shoulder     Bilateral edema of lower extremity     Essential hypertension     Acute on chronic respiratory failure (H)     Acute on chronic diastolic congestive heart failure (H)     Dysphagia     Gastroesophageal reflux disease, esophagitis presence not specified     Major depressive disorder, recurrent episode, moderate (H)     Abnormality of gait and mobility     Past Surgical History   Procedure Laterality Date     C shoulder surg proc unlisted       Shoulder     C shoulder surg proc unlisted       Shoulder     C shoulder surg proc unlisted       Shoulder     Hc revise median n/carpal tunnel surg  7/01/02     maeganet, left     Hc revise median n/carpal tunnel surg  09/24/02     Dr Montiel left     Arthroplasty hip bilateral       Arthroscopy shoulder distal clavicle repair  5/30/2012     Procedure:ARTHROSCOPY SHOULDER DISTAL CLAVICLE RESECTION; Left shoulder arthroscopy, Bicep tenontomy, debridement of partial thickness cuff tear, Subacromial Decompression.; Surgeon:MONIE MENDIETA; Location: OR     [facility]. microwave thermotx  2012      dr Sin      Sigmoidoscopy flexible N/A 2/16/2016     Procedure: SIGMOIDOSCOPY FLEXIBLE;  Surgeon: Ky Small MD;  Location:  GI       Social History   Substance Use Topics     Smoking status: Former Smoker     Quit date: 01/01/1990     Smokeless tobacco: Never Used      Comment: 20 years ago     Alcohol Use: No     Family History   Problem Relation Age of Onset     Colon Cancer No family hx of          Current Outpatient Prescriptions   Medication Sig Dispense Refill     omeprazole (PRILOSEC) 20 MG CR capsule TAKE ONE CAPSULE BY MOUTH ONE TIME DAILY  30 capsule 0     MAPAP 500 MG tablet TAKE ONE TABLET BY MOUTH EVERY FOUR HOURS AS NEEDED AND TAKE 2 TABLET BY MOUTH EVERY NIGHT AT BEDTIME.  GENERIC FOR TYLENOL (ACETAMINOPHEN) 240 tablet 0     gabapentin (NEURONTIN) 300 MG capsule TAKE TWO CAPSULES BY MOUTH TWICE DAILY  120 capsule 0     traZODone (DESYREL) 50 MG tablet Take 50mg at bedtime. 90 tablet 1     torsemide (DEMADEX) 10 MG tablet Take 1 tablet (10 mg) by mouth daily 30 tablet 1     formoterol (PERFOROMIST) 20 MCG/2ML neb solution Take 2 mLs (20 mcg) by nebulization every 12 hours 2 mL 1     SPIRIVA HANDIHALER 18 MCG capsule INHALE 1 CAPSULE (18 MCG) BY INHALATION ROUTE ONCE DAILY 30 capsule 0     DULoxetine (CYMBALTA) 60 MG EC capsule Take 1 capsule (60 mg) by mouth daily 90 capsule 0     budesonide (PULMICORT) 0.5 MG/2ML neb solution Take 2 mLs (0.5 mg) by nebulization 2 times daily 60 mL 3     metoprolol (LOPRESSOR) 25 MG tablet TAKE ONE-HALF TABLET BY MOUTH 2 TIMES DAY 90 tablet 0     buPROPion (WELLBUTRIN XL) 150 MG 24 hr tablet TAKE ONE TABLET BY MOUTH EVERY NIGHT AT BEDTIME 90 tablet 0     tamsulosin (FLOMAX) 0.4 MG capsule TAKE ONE CAPSULE BY MOUTH EVERY DAY 90 capsule 0     montelukast (SINGULAIR) 10 MG tablet TAKE ONE TABLET BY MOUTH EVERY NIGHT AT BEDTIME 90 tablet 0     albuterol (PROAIR HFA, PROVENTIL HFA, VENTOLIN HFA) 108 (90 BASE) MCG/ACT inhaler Inhale 2 puffs into the lungs every 6  hours as needed for shortness of breath / dyspnea 1 Inhaler 6     Simethicone 180 MG CAPS Take 1 capsule by mouth 2 times daily 60 capsule      lidocaine (XYLOCAINE) 5 % ointment Apply topically as needed for moderate pain 50 g 1     senna-docusate (SENOKOT-S;PERICOLACE) 8.6-50 MG per tablet Take 1 tablet by mouth 2 times daily as needed for constipation 100 tablet 3     albuterol (2.5 MG/3ML) 0.083% nebulizer solution INHALE 1 VIAL BY NEBULIZATION EVERY 2 HOURS AS NEEDED FOR DYSPNEA  540 mL 0     order for DME Equipment being ordered: Oxygen Titration visit [facility]  Fax # [phone]  0     Allergies   Allergen Reactions     No Known Allergies      Recent Labs   Lab Test  10/11/16   1620  04/18/16   0600   02/21/16   0450   02/19/16   0630   05/14/14   1345   04/07/13   0553   06/23/11   0500 03/30/11   A1C   --    --    --    --    --   6.3*   --    --    --   5.4   --    --    --    LDL   --    --    --    --    --    --    --    --    --    --    --   132*   --    HDL   --    --    --    --    --    --    --    --    --    --    --   41   --    TRIG   --    --    --    --    --    --    --    --    --    --    --   195*   --    ALT  23   --    --   58   --   37   < >  22   --    --    < >   --    --    CR  0.94  0.74   < >  0.81   < >  0.74   < >  0.86   < >  0.72   < >   --    --    GFRESTIMATED  76  >90  Non  GFR Calc     < >  >90  Non  GFR Calc     < >  >90  Non  GFR Calc     < >  81   < >  >90   < >   --    --    GFRESTBLACK  >90   GFR Calc    >90   GFR Calc     < >  >90   GFR Calc     < >  >90   GFR Calc     < >   --    --   >90   < >   --    --    POTASSIUM  4.6  4.3   < >  4.0   < >  4.5   < >  4.9   < >  4.2   < >   --    --    TSH   --    --    --    --    --    --    --   1.15   --    --    --    --   0.86    < > = values in this interval not displayed.      BP Readings from Last 3  Encounters:   01/12/17 138/78   01/04/17 130/70   11/29/16 120/70    Wt Readings from Last 3 Encounters:   10/11/16 225 lb (102.059 kg)   10/11/16 226 lb (102.513 kg)   04/22/16 204 lb 12.8 oz (92.897 kg)            Labs reviewed in EPIC  Problem list, Medication list, Allergies, and Medical/Social/Surgical histories reviewed in [facility] and updated as appropriate.    ROS:  Constitutional, HEENT, cardiovascular, pulmonary, GI, , musculoskeletal, neuro, skin, endocrine and psych systems are negative, except as otherwise noted.    OBJECTIVE:                                                    There were no vitals taken for this visit.  There is no weight on file to calculate BMI.  GENERAL:  male, alert, sitting up at kitchen table, oxygen dependent .  EYES: Eyes grossly normal to inspection, PERRL and conjunctivae and sclerae normal  HENT:Head-normocephalic without lesions. Left ear: auricle and pinna non tender, mouth 1 tooth lower, no erythema no palpable masses.   RESP: lungs clear to auscultation - no rales, rhonchi or wheezes  CV: regular rate and rhythm, normal S1 S2, no S3 or S4, no murmur, click or rub  ABDOMEN: obese, soft, nontender, without hepatosplenomegaly or masses and bowel sounds normal  MS: no gross musculoskeletal defects noted, edema improved.  SKIN: no suspicious lesions or rashes  NEURO: Normal strength and tone, mentation intact and speech normal  PSYCH: seemed disengaged, somewhat cooperative but pleasant    Diagnostic Test Results:  none      ASSESSMENT/PLAN:

## 2017-02-10 NOTE — MR AVS SNAPSHOT
After Visit Summary   2/10/2017    Karan Vaz    MRN: [id number]           Patient Information     Date Of Birth          10/7/1932        Visit Information        Provider Department      2/10/2017 11:00 AM Sindi Wilson APRN CNP [facility]        Today's Diagnoses     Major depressive disorder, recurrent episode, moderate (H)    -  1    Essential hypertension        Panlobular emphysema (H)        Primary insomnia        Acute on chronic diastolic congestive heart failure (H)        Abnormality of gait and mobility        Herpes zoster without complication        Slow transit constipation        Constipation, unspecified constipation type        Other chronic pain        Advanced directives, counseling/discussion          Care Instructions    Please increase your acetaminophen to 1,000mg 2x daily instead of one    Please restart your lidocaine cream 3x daily as needed for pain    Please take your fibercon every mornig as ordered    Please take your Senakot-S 2x daily     I will order hospice for you and they will call you to set up an appt     Please elevate your legs when you are sitting up in your WC, please try and get some tube socks to wear when you are up    We will see you again in one month, 3/10/17        Follow-ups after your visit        Additional Services     HOSPICE REFERRAL       **Order classes of: [place] [facility], [facility] and [facility] will route to the Home Care and Hospice Referral Pool.  Home Care or Hospice will then contact the patient to schedule their appointment.**    If you do not hear from Home Care and Hospice, or you would like to call to schedule, please call the referring place of service that your provider has listed below.  ______________________________________________________________________    Your provider has referred you to: [facility]: [facility] [phone]    [email or web address]    Extended Emergency Contact Information  Primary Emergency Contact: Britt Vaz  Address: [address] [place] [address] [facility]  Home Phone: [phone]  Work Phone: none  Mobile Phone: none  Relation: Spouse  Secondary Emergency Contact: Zaynab Low  Address: [address] [place] [address] [facility]  Home Phone: [phone]  Work Phone: none  Mobile Phone: [phone]  Relation: Daughter  PLEASE EVALUATE AND TREAT (Evaluation timeline is 24 - 48 hrs. Please call if there is need for a variance to this timeline).    REASON FOR REFERRAL: Hospice - Diagnosis: CHF and end stage emphysema     ADDITIONAL SERVICES NEEDED: Life Line, and SW    OTHER PERTINENT INFORMATION: Patient was last seen by provider on 2/10/17 for Recheck on CHF, Pain, emphysema safety.    Current Outpatient Prescriptions:  lidocaine (XYLOCAINE) 5 % ointment, Apply topically 3 times daily as needed for moderate pain, Disp: 50 g, Rfl: 1  omeprazole (PRILOSEC) 20 MG CR capsule, TAKE ONE CAPSULE BY MOUTH ONE TIME DAILY , Disp: 30 capsule, Rfl: 0  MAPAP 500 MG tablet, TAKE ONE TABLET BY MOUTH EVERY FOUR HOURS AS NEEDED AND TAKE 2 TABLET BY MOUTH EVERY NIGHT AT BEDTIME.  GENERIC FOR TYLENOL (ACETAMINOPHEN), Disp: 240 tablet, Rfl: 0  gabapentin (NEURONTIN) 300 MG capsule, TAKE TWO CAPSULES BY MOUTH TWICE DAILY , Disp: 120 capsule, Rfl: 0  traZODone (DESYREL) 50 MG tablet, Take 50mg at bedtime., Disp: 90 tablet, Rfl: 1  torsemide (DEMADEX) 10 MG tablet, Take 1 tablet (10 mg) by mouth daily, Disp: 30 tablet, Rfl: 1  formoterol (PERFOROMIST) 20 MCG/2ML neb solution, Take 2 mLs (20 mcg) by nebulization every 12 hours, Disp: 2 mL, Rfl: 1  SPIRIVA HANDIHALER 18 MCG capsule, INHALE 1 CAPSULE (18 MCG) BY INHALATION ROUTE ONCE DAILY, Disp: 30 capsule, Rfl: 0  DULoxetine (CYMBALTA) 60 MG EC capsule, Take 1 capsule (60 mg) by mouth daily, Disp: 90 capsule, Rfl:  0  budesonide (PULMICORT) 0.5 MG/2ML neb solution, Take 2 mLs (0.5 mg) by nebulization 2 times daily, Disp: 60 mL, Rfl: 3  metoprolol (LOPRESSOR) 25 MG tablet, TAKE ONE-HALF TABLET BY MOUTH 2 TIMES DAY, Disp: 90 tablet, Rfl: 0  buPROPion (WELLBUTRIN XL) 150 MG 24 hr tablet, TAKE ONE TABLET BY MOUTH EVERY NIGHT AT BEDTIME, Disp: 90 tablet, Rfl: 0  tamsulosin (FLOMAX) 0.4 MG capsule, TAKE ONE CAPSULE BY MOUTH EVERY DAY, Disp: 90 capsule, Rfl: 0  montelukast (SINGULAIR) 10 MG tablet, TAKE ONE TABLET BY MOUTH EVERY NIGHT AT BEDTIME, Disp: 90 tablet, Rfl: 0  albuterol (PROAIR HFA, PROVENTIL HFA, VENTOLIN HFA) 108 (90 BASE) MCG/ACT inhaler, Inhale 2 puffs into the lungs every 6 hours as needed for shortness of breath / dyspnea, Disp: 1 Inhaler, Rfl: 6  Simethicone 180 MG CAPS, Take 1 capsule by mouth 2 times daily, Disp: 60 capsule, Rfl:   senna-docusate (SENOKOT-S;PERICOLACE) 8.6-50 MG per tablet, Take 1 tablet by mouth 2 times daily as needed for constipation, Disp: 100 tablet, Rfl: 3  albuterol (2.5 MG/3ML) 0.083% nebulizer solution, INHALE 1 VIAL BY NEBULIZATION EVERY 2 HOURS AS NEEDED FOR DYSPNEA , Disp: 540 mL, Rfl: 0  order for DME, Equipment being ordered: Oxygen Titration visit [facility]  Fax # [phone], Disp: , Rfl: 0      Patient Active Problem List:     Myoclonus     History of peptic ulcer disease     elastofibroma thoracic wall, benign     Restless legs syndrome (RLS)     Sensory neuropathy (H)     Health Care Home     Hypertrophy of prostate with urinary retention     Carpal tunnel syndrome     Glaucoma     Degeneration of cervical intervertebral disc     Anal sphincter incompetence     ACP (advance care planning)     Primary insomnia     Panlobular emphysema (H)     Primary osteoarthritis of right shoulder     Bilateral edema of lower extremity     Essential hypertension     Acute on chronic respiratory failure (H)     Acute on chronic diastolic congestive heart failure (H)     Dysphagia      Gastroesophageal reflux disease, esophagitis presence not specified     Major depressive disorder, recurrent episode, moderate (H)     Abnormality of gait and mobility      Documentation of Face to Face and Certification for Home Health Services    I certify that patient, Karan Vaz is under my care and that I, or a Nurse Practitioner or Physician's Assistant working with me, had a face-to-face encounter that meets the physician face-to-face encounter requirements with this patient on: 2/10/2017.    This encounter with the patient was in whole, or in part, for the following medical condition, which is the primary reason for Home Health Care: emphysema, CHF, pain safety.    I certify that, based on my findings, the following services are medically necessary Home Health Services: Nursing and hospice services    My clinical findings support the need for the above services because: Nurse is needed: hospice care.    Further, I certify that my clinical findings support that this patient is homebound (i.e. absences from home require considerable and taxing effort and are for medical reasons or [facility] services or infrequently or of short duration when for other reasons) because: Requires assistance of another person or specialized equipment to access medical services because patient: Is unable to walk greater than 20 feet without rest. and Range of motion limitations prevents ability to exit home safely..    Based on the above findings, I certify that this patient is confined to the home and needs intermittent skilled nursing care, physical therapy and/or speech therapy.  The patient is under my care, and I have initiated the establishment of the plan of care.  This patient will be followed by a physician who will periodically review the plan of care.    Physician/Provider to provide follow up care: Nidia Mitchell [place] certified Physician at time of discharge: J Luis Collier    Please be  aware that coverage of these services is subject to the terms and limitations of your health insurance plan.  Call member services at your health plan with any benefit or coverage questions.                  Follow-up notes from your care team     Return in about 4 weeks (around 3/10/2017), or if symptoms worsen or fail to improve.      Your next 10 appointments already scheduled     Mar 01, 2017  2:00 PM CST   Office Visit with Karla Palafox [facility] ([facility])    [address] [place] [address]  [address] [place] [id number] [phone]           Bring a current list of meds and any records pertaining to this visit.  For Physicals, please bring immunization records and any forms needing to be filled out.  Please arrive 10 minutes early to complete paperwork.            Mar 10, 2017  9:45 AM CST   [facility] with EMI [facility]ta CNP   [facility]    [address] [place] [address]  [address] [place] [address] [phone]              Who to contact     If you have questions or need follow up information about today's clinic visit or your schedule please contact [facility] directly at [phone].  Normal or non-critical lab and imaging results will be communicated to you by MyChart, letter or phone within 4 business days after the clinic has received the results. If you do not hear from us within 7 days, please contact the clinic through [facility] or phone. If you have a critical or abnormal lab result, we will notify you by phone as soon as possible.  Submit refill requests through [employer] or call your pharmacy and they will forward the refill request to us. Please allow 3 business days for your refill to be completed.          Additional Information About Your Visit        [facility] Information     [employer] lets you send messages to your doctor, view your test  "results, renew your prescriptions, schedule appointments and more. To sign up, go to [email or web address] . Click on \"Log in\" on the left side of the screen, which will take you to the Welcome page. Then click on \"Sign up Now\" on the right side of the page.     You will be asked to enter the access code listed below, as well as some personal information. Please follow the directions to create your username and password.     Your access code is: [email or web address]  Expires: 2017 11:02 PM     Your access code will  in 90 days. If you need help or a new code, please call your [facility] clinic or [phone].        Care EveryWhere ID     This is your Care EveryWhere ID. This could be used by other organizations to access your [facility] medical records  [id number]        Your Vitals Were     Pulse Respirations Pulse Oximetry             80 18 98%          Blood Pressure from Last 3 Encounters:   02/10/17 140/60   17 138/78   17 130/70    Weight from Last 3 Encounters:   10/11/16 225 lb (102.1 kg)   10/11/16 226 lb (102.5 kg)   16 204 lb 12.8 oz (92.9 kg)              We Performed the Following     ADVANCE CARE PLANNING DISCUSS DOCUMENTED IN MEDICAL RECORD     HOSPICE REFERRAL          Today's Medication Changes          These changes are accurate as of: 2/10/17 11:59 PM.  If you have any questions, ask your nurse or doctor.               These medicines have changed or have updated prescriptions.        Dose/Directions    lidocaine 5 % ointment   Commonly known as:  XYLOCAINE   This may have changed:  when to take this   Used for:  Herpes zoster without complication   Changed by:  Sindi Wilson, APRN CNP        Apply topically 3 times daily as needed for moderate pain   Quantity:  50 g   Refills:  1       MAPAP 500 MG tablet   This may have changed:  See the new instructions.   Used for:  Other chronic pain   Generic drug:  acetaminophen   Changed by:  Sindi Wilson, " EMI CNP        Dose:  1000 mg   Take 2 tablets (1,000 mg) by mouth 2 times daily   Quantity:  240 tablet   Refills:  0       senna-docusate 8.6-50 MG per tablet   Commonly known as:  SENOKOT-S;PERICOLACE   This may have changed:    - when to take this  - reasons to take this   Used for:  Constipation, unspecified constipation type   Changed by:  Sindi Wilson APRN CNP        Dose:  1 tablet   Take 1 tablet by mouth 2 times daily   Quantity:  100 tablet   Refills:  3                Primary Care Provider Office Phone # Fax #    Nidia Ruiz EMI Mitchell [phone]       [facility] [address] [place] [address] [address] [place] [id number]        Thank you!     Thank you for choosing [facility]  for your care. Our goal is always to provide you with excellent care. Hearing back from our patients is one way we can continue to improve our services. Please take a few minutes to complete the written survey that you may receive in the mail after your visit with us. Thank you!             Your Updated Medication List - Protect others around you: Learn how to safely use, store and throw away your medicines at www.disposemymeds.org.          This list is accurate as of: 2/10/17 11:59 PM.  Always use your most recent med list.                   Brand Name Dispense Instructions for use    * albuterol (2.5 MG/3ML) 0.083% neb solution     540 mL    INHALE 1 VIAL BY NEBULIZATION EVERY 2 HOURS AS NEEDED FOR DYSPNEA       * albuterol 108 (90 BASE) MCG/ACT Inhaler    PROAIR HFA/PROVENTIL HFA/VENTOLIN HFA    1 Inhaler    Inhale 2 puffs into the lungs every 6 hours as needed for shortness of breath / dyspnea       budesonide 0.5 MG/2ML neb solution    PULMICORT    60 mL    Take 2 mLs (0.5 mg) by nebulization 2 times daily       buPROPion 150 MG 24 hr tablet    WELLBUTRIN XL    90 tablet    TAKE ONE TABLET BY MOUTH EVERY NIGHT AT BEDTIME       DULoxetine 60 MG EC capsule     CYMBALTA    90 capsule    Take 1 capsule (60 mg) by mouth daily       formoterol 20 MCG/2ML neb solution    PERFOROMIST    2 mL    Take 2 mLs (20 mcg) by nebulization every 12 hours       gabapentin 300 MG capsule    NEURONTIN    120 capsule    TAKE TWO CAPSULES BY MOUTH TWICE DAILY       lidocaine 5 % ointment    XYLOCAINE    50 g    Apply topically 3 times daily as needed for moderate pain       MAPAP 500 MG tablet   Generic drug:  acetaminophen     240 tablet    Take 2 tablets (1,000 mg) by mouth 2 times daily       metoprolol 25 MG tablet    LOPRESSOR    90 tablet    TAKE ONE-HALF TABLET BY MOUTH 2 TIMES DAY       montelukast 10 MG tablet    SINGULAIR    90 tablet    TAKE ONE TABLET BY MOUTH EVERY NIGHT AT BEDTIME       omeprazole 20 MG CR capsule    priLOSEC    30 capsule    TAKE ONE CAPSULE BY MOUTH ONE TIME DAILY       order for DME      Equipment being ordered: Oxygen Titration visit [facility] Fax # [phone]       senna-docusate 8.6-50 MG per tablet    SENOKOT-S;PERICOLACE    100 tablet    Take 1 tablet by mouth 2 times daily       Simethicone 180 MG Caps     60 capsule    Take 1 capsule by mouth 2 times daily       SPIRIVA HANDIHALER 18 MCG capsule   Generic drug:  tiotropium     30 capsule    INHALE 1 CAPSULE (18 MCG) BY INHALATION ROUTE ONCE DAILY       tamsulosin 0.4 MG capsule    FLOMAX    90 capsule    TAKE ONE CAPSULE BY MOUTH EVERY DAY       torsemide 10 MG tablet    DEMADEX    30 tablet    Take 1 tablet (10 mg) by mouth daily       traZODone 50 MG tablet    DESYREL    90 tablet    Take 50mg at bedtime.       * Notice:  This list has 2 medication(s) that are the same as other medications prescribed for you. Read the directions carefully, and ask your doctor or other care provider to review them with you.

## 2017-02-13 NOTE — PATIENT INSTRUCTIONS
Please increase your acetaminophen to 1,000mg 2x daily instead of one    Please restart your lidocaine cream 3x daily as needed for pain    Please take your fibercon every mornig as ordered    Please take your Senakot-S 2x daily     I will order hospice for you and they will call you to set up an appt     Please elevate your legs when you are sitting up in your WC, please try and get some tube socks to wear when you are up    We will see you again in one month, 3/10/17

## 2017-02-14 NOTE — TELEPHONE ENCOUNTER
"Wife called back about the pain.  Karan 's pain today is \"terrible\" and it is centered behind the ear.  He took 2 tylenol and it has done nothing for him.  Wife states that he was also in pain yesterday.    Advised her that triage nurse will call her.    [phone]  "

## 2017-02-14 NOTE — TELEPHONE ENCOUNTER
Information noted.  He needs someone to look into his left ear, and to press on his mastoid to exclude mastoiditis.  Can this be scheduled with an available practitioner?  If not, Urgent Care might be his only option.

## 2017-02-14 NOTE — TELEPHONE ENCOUNTER
Spoke with spouse who is very anxious.  She states patient is having pain behind his left ear which radiates down to his chin.  She states the lidocaine gives relief only for a couple minutes then the pain returns.  She has increased the acetaminophen to 1,000 mg BID which has not been giving relief.  She also has tried ice and warm packs which also have not given relief.      Patient not with fever, and no URI symptoms noted.  She is uncertain if this could be wax or possible ear infection when questioned further.  He denies any sinus pain or pressure.    Routing to provider to review.  Will randal as high priority due to the anxiety of the spouse.  FYI, hospice referral received at [facility] but nothing scheduled yet.    Parvin Cardenas RN

## 2017-02-15 NOTE — TELEPHONE ENCOUNTER
Left voicemail for Britt, spouse to return call with update on patient condition.    Parvin Cardenas RN

## 2017-02-15 NOTE — TELEPHONE ENCOUNTER
Looked at current schedules. Appointment already for the 10th with PCP but needing sooner is going to be difficult. PCP is scheduled full and Liset Huerta doesn't have availability either. Possibly Nimisha if able or urgent care is still an option?    ROMEO Rosenberg

## 2017-02-16 NOTE — TELEPHONE ENCOUNTER
"Est CrCl ([place]-Gault, AjBW 77.7kg) ~ 64 ml/min  Wt Readings from Last 1 Encounters:   10/11/16 225 lb (102.1 kg)     Ht Readings from Last 1 Encounters:   10/11/16 5' 5\" (1.651 m)     Creatinine   Date Value Ref Range Status   10/11/2016 0.94 0.66 - 1.25 mg/dL Final   ]      OK to dose gabapentin TID, would recommend increasing with caution due to age. CrCl calculated above is likely an overestimation given his low muscle mass. Consider the following:    Week 1: take gabapentin 600mg AM, 300mg PM, 600mg HS (at least 6 hours apart)  Week 2: increase to gabapentin 600mg TID    Monitor for increase sleepiness or dizziness, call PCP if bothersome    Karla Palafox, OmarD, BCACP   "

## 2017-02-16 NOTE — TELEPHONE ENCOUNTER
carol De La Torre's wife called (again) about the pain behind his ear. She said the lidocaine does not help with the pain and they don't know what else to do.    Please see notes below as well for Dr. Collier's recommendation and hospice appt yesterday.

## 2017-02-16 NOTE — TELEPHONE ENCOUNTER
Nimisha would like Karla's opinion about whether pt could be on higher dose of gabapentin for the TMJ/ear pain. He currently takes 600 mg BID and Nimisha is thinking of increasing to TID.    The pt's wife has called multiple times in the past 2 days regarding the pt's pain.    Jocelyn Mireles RN

## 2017-02-16 NOTE — TELEPHONE ENCOUNTER
The pain behind pt's ear is a chronic issue, for which he has seen an ENT a couple times. Last ENT visit was 11/7/16 and Dr. 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  ENT recommended pt see his dentist and to have his upper denture re-fitted. The pt did see DDS in December, per rBitt, and he was told he did not have an infection or abscess and his dentures were fitted.    Upon chart review in [facility], noted the pt was seen by  Hospice yesterday. I spoke with Grace, hospice RN who saw the pt.   Grace said the patient definitely qualifies and needs hospice, but his daughter said they would not sign on until her brother could be at another intake appointment. Grace said they are waiting for a call to reschedule this appointment and she thinks it will be within the next few days.    I will route this note to Nimisha Wilson NP, who saw pt on 2/10 and is covering for Hilaria Mitchell.  Is there anything that you would be willing to order for the pt's pain while they are awaiting hospice services?    Jocelyn Mireles RN

## 2017-02-16 NOTE — TELEPHONE ENCOUNTER
Advised pt's wife of Nimisha's order for Tramadol, to start at 25 mg TID and can increase to 50 mg if not effective. Britt voiced understanding.    Nimisha called pt's daughter as she will set up meds and needs to know about increase in gabapentin and the new order for Tramadol.    Jocelyn Mireles RN

## 2017-02-16 NOTE — TELEPHONE ENCOUNTER
Patient was accepted into Hospice and wishes a palliative approach, daughter wants to have a conversation with her brother before they make a final hospice decision.  They are hoping to have a decision by this weekend.  Patient and his wife are very anxious about his ear pain, we are going to increase his gabapentin to see if that helps but unclear if that will help since his current dose has not been effective.  We will order some Tramadol 25mg 3x daily to help with his pain.  Please let the wife and daughter know, message left with daughter,     Nimisha WHITE

## 2017-02-16 NOTE — TELEPHONE ENCOUNTER
"Britt, called back. Pain behind ear is still persistent, last night he was \"holding the area and whining\" and \"he just woke up and it is still terrible\".  Patient has been taking tylenol to not effect.    She asked if someone could come out today, which writer doubts unless Nimisha can make a PRN visit.  Since this has been going on for days now, hospital or urgent care might be best option.    Wife would like a call back.   "

## 2017-02-20 NOTE — TELEPHONE ENCOUNTER
"Spoke with Britt, pt's wife, who said the patient seems to be more short of breath at night since switching to the amoro ellipta inhaler. I don't see this on the pt's med list, but [facility] said that the patient got this inhaler on 1/23/17.    Britt is concerned that Karan might not be getting the full dose of this medication because \"his lungs are not strong enough\". She thinks he was doing better when he was on the other nebulized medication.    Will route this to Karla to clarify regarding the medication issue. If pt is using amoro ellipta, was he supposed to discontinue a different med that he is taking?    Pt told his wife that he has had to gasp for air at night for the past couple of nights, but seems okay during the day.   Per Britt, pt's LE edema was at baseline last night. She said he has been eating and drinking okay over the weekend.    I asked Britt again what their thoughts are regarding hospice. She said their dtr, Zaynab, told them it is their decision  She also said Zaynab is planning to call  Hospice today to get more information about their services as she had to leave early for work on 2/15. Britt said it would be fine for me to call Zaynab to discuss hospice as well.    Jocelyn Mireles RN          "

## 2017-02-20 NOTE — TELEPHONE ENCOUNTER
Advised Britt that Karla said Karan can resume previous regimen of Spiriva once daily, Performist neb BID and budesonide neb BID and to stop the Anoro Ellipta.  Britt voiced understanding and said they would need refills of Spiriva and Performist. Sent per verbal order from salomón Bansal for Hilaria Mitchell NP.    Called pt's daughter, Zaynab, and informed her that he will be going back to his previous respiratory regimen as above.    Also discussed hospice with Zaynab and she said her dad is not ready for hospice yet. He would like a palliative approach and Zaynab wants providers to be aware of that.  Will route to Nimisha and Hilaria to ask if a home palliative consult would be appropriate.    Hilaria and Karla are scheduled to see pt on 3/1.    Jocelyn Mireles RN

## 2017-02-20 NOTE — TELEPHONE ENCOUNTER
Received call from Britt pt's wife. She states that his breathing has gotten worse since he was changed over to the inhaler a few weeks ago.    Will route to triage.

## 2017-02-20 NOTE — PATIENT INSTRUCTIONS
Spoke with Page, at [facility]. She said they have not heard back from pt's daughter about rescheduling an appt. Page said they could see pt as early as this afternoon if they would like an appointment.    Jocelyn Mireles RN

## 2017-02-21 NOTE — TELEPHONE ENCOUNTER
Nimisha, what is your recommendation given you talked extensively with daughter about hospice and now she wants palliative ? and what is daughter's hesitation of hospice again?

## 2017-02-27 NOTE — TELEPHONE ENCOUNTER
Per conversation with pt's wife, it is Karan who declined hospice at this time. I had a long conversation with his wife and daughter and explained benefits of hospice. Encouraged them both to continue the conversation with the patient as hospice could start services in as little as 24 hours if pt changes his mind.    Jocelyn Mireles RN

## 2017-03-01 NOTE — MR AVS SNAPSHOT
After Visit Summary   3/1/2017    Karan Vaz    MRN: [id number]           Patient Information     Date Of Birth          10/7/1932        Visit Information        Provider Department      3/1/2017 2:00 PM Karla Palafox, [facility] [place]        Today's Diagnoses     Chronic TMJ pain        Constipation, unspecified constipation type        Major depressive disorder, recurrent episode, moderate (H)        Primary insomnia        Panlobular emphysema (H)          Care Instructions    See AVS from PCP encounter for details         Follow-ups after your visit        Your next 10 appointments already scheduled     Apr 12, 2017 12:15 PM CDT   Office Visit with Karla Palafox [facility] ([facility])    [address] [place] [address]  [address] [place] [id number] [phone]           Bring a current list of meds and any records pertaining to this visit.  For Physicals, please bring immunization records and any forms needing to be filled out.  Please arrive 10 minutes early to complete paperwork.            Apr 14, 2017  9:45 AM CDT   Return Visit with EMI Orta CNP   [facility] ([facility])    [address]  [address] [place] [id number] [phone]            May 12, 2017  9:45 AM CDT   Return Visit with EMI Orta CNP   [facility] ([facility])    [address]  [address] [place] [id number] [phone]            Jun 09, 2017  9:45 AM CDT   Return Visit with EMI Orta CNP   [facility] ([facility])    [address]  [address] [place] [phone]   [phone]              Who to contact     If you have questions or need follow up information about today's clinic visit or your schedule please  "contact [facility] [place] directly at [phone].  Normal or non-critical lab and imaging results will be communicated to you by MyChart, letter or phone within 4 business days after the clinic has received the results. If you do not hear from us within 7 days, please contact the clinic through MyChart or phone. If you have a critical or abnormal lab result, we will notify you by phone as soon as possible.  Submit refill requests through [employer] or call your pharmacy and they will forward the refill request to us. Please allow 3 business days for your refill to be completed.          Additional Information About Your Visit        [employer] Information     [employer] lets you send messages to your doctor, view your test results, renew your prescriptions, schedule appointments and more. To sign up, go to [email or web address] . Click on \"Log in\" on the left side of the screen, which will take you to the Welcome page. Then click on \"Sign up Now\" on the right side of the page.     You will be asked to enter the access code listed below, as well as some personal information. Please follow the directions to create your username and password.     Your access code is: [email or web address]  Expires: 2017 11:02 PM     Your access code will  in 90 days. If you need help or a new code, please call your [facility] clinic or [phone].        Care EveryWhere ID     This is your Care EveryWhere ID. This could be used by other organizations to access your [facility] medical records  [id number]         Blood Pressure from Last 3 Encounters:   17 120/64   02/10/17 140/60   17 138/78    Weight from Last 3 Encounters:   10/11/16 225 lb (102.1 kg)   10/11/16 226 lb (102.5 kg)   16 204 lb 12.8 oz (92.9 kg)              Today, you had the following     No orders found for display         Today's Medication Changes          These changes are accurate as of: 3/1/17  4:08 PM.  If you have any " questions, ask your nurse or doctor.               These medicines have changed or have updated prescriptions.        Dose/Directions    senna-docusate 8.6-50 MG per tablet   Commonly known as:  SENOKOT-S;PERICOLACE   This may have changed:  how much to take   Used for:  Constipation, unspecified constipation type   Changed by:  Karla Palafox RPH        Dose:  2 tablet   Take 2 tablets by mouth 2 times daily   Quantity:  100 tablet   Refills:  3       traMADol 50 MG tablet   Commonly known as:  ULTRAM   This may have changed:  how much to take   Used for:  Chronic TMJ pain   Changed by:  Karla Palafox RPH        Dose:  50 mg   Take 1 tablet (50 mg) by mouth 3 times daily as needed for moderate pain   Quantity:  90 tablet   Refills:  0                Primary Care Provider Office Phone # Fax #    Nidia EMI Esqueda [facility] [phone]       [address] [place] [address]        Thank you!     Thank you for choosing [facility]  for your care. Our goal is always to provide you with excellent care. Hearing back from our patients is one way we can continue to improve our services. Please take a few minutes to complete the written survey that you may receive in the mail after your visit with us. Thank you!             Your Updated Medication List - Protect others around you: Learn how to safely use, store and throw away your medicines at www.disposemymeds.org.          This list is accurate as of: 3/1/17  4:08 PM.  Always use your most recent med list.                   Brand Name Dispense Instructions for use    * albuterol 108 (90 BASE) MCG/ACT Inhaler    PROAIR HFA/PROVENTIL HFA/VENTOLIN HFA    1 Inhaler    Inhale 2 puffs into the lungs every 6 hours as needed for shortness of breath / dyspnea       * albuterol (2.5 MG/3ML) 0.083% neb solution     540 mL    INHALE 1 VIAL BY NEBULIZATION EVERY 2 HOURS AS  NEEDED FOR DYSPNEA       budesonide 0.5 MG/2ML neb solution    PULMICORT    60 mL    Take 2 mLs (0.5 mg) by nebulization 2 times daily       buPROPion 150 MG 24 hr tablet    WELLBUTRIN XL    90 tablet    TAKE ONE TABLET BY MOUTH EVERY NIGHT AT BEDTIME       DULoxetine 60 MG EC capsule    CYMBALTA    90 capsule    Take 1 capsule (60 mg) by mouth daily       formoterol 20 MCG/2ML neb solution    PERFOROMIST    2 mL    Take 2 mLs (20 mcg) by nebulization every 12 hours       gabapentin 300 MG capsule    NEURONTIN    120 capsule    TAKE TWO CAPSULES BY MOUTH TWICE DAILY       lidocaine 5 % ointment    XYLOCAINE    50 g    Apply topically 3 times daily as needed for moderate pain       MAPAP 500 MG tablet   Generic drug:  acetaminophen     240 tablet    Take 2 tablets (1,000 mg) by mouth 2 times daily       metoprolol 25 MG tablet    LOPRESSOR    90 tablet    TAKE HALF TABLET BY MOUTH TWICE DAILY       montelukast 10 MG tablet    SINGULAIR    90 tablet    TAKE ONE TABLET BY MOUTH EVERY NIGHT AT BEDTIME       omeprazole 20 MG CR capsule    priLOSEC    30 capsule    TAKE ONE CAPSULE BY MOUTH ONE TIME DAILY       order for DME      Equipment being ordered: Oxygen Titration visit [facility] Fax # [phone]       senna-docusate 8.6-50 MG per tablet    SENOKOT-S;PERICOLACE    100 tablet    Take 2 tablets by mouth 2 times daily       Simethicone 180 MG Caps     60 capsule    Take 1 capsule by mouth 2 times daily       tamsulosin 0.4 MG capsule    FLOMAX    90 capsule    TAKE ONE CAPSULE BY MOUTH EVERY DAY       tiotropium 18 MCG capsule    SPIRIVA HANDIHALER    30 capsule    INHALE 1 CAPSULE (18 MCG) BY INHALATION ROUTE ONCE DAILY       torsemide 10 MG tablet    DEMADEX    30 tablet    Take 1 tablet (10 mg) by mouth daily       traMADol 50 MG tablet    ULTRAM    90 tablet    Take 1 tablet (50 mg) by mouth 3 times daily as needed for moderate pain       traZODone 50 MG tablet    DESYREL    90 tablet    Take 50mg at  bedtime.       * Notice:  This list has 2 medication(s) that are the same as other medications prescribed for you. Read the directions carefully, and ask your doctor or other care provider to review them with you.

## 2017-03-01 NOTE — MR AVS SNAPSHOT
After Visit Summary   3/1/2017    Karan Vaz    MRN: [id number]           Patient Information     Date Of Birth          10/7/1932        Visit Information        Provider Department      3/1/2017 2:00 PM Nidia Mitchell APRN CNP [facility]        Today's Diagnoses     Acute on chronic diastolic congestive heart failure (H)    -  1    Advanced directives, counseling/discussion        Primary insomnia        Muscle spasm        Fatigue, unspecified type        Pain of mastoid region, left        Constipation, unspecified constipation type          Care Instructions    Increase senekot 2 tablets twice daily.     You accepted to enroll in hospice I think this is a very good thing.             Follow-ups after your visit        Your next 10 appointments already scheduled     Apr 12, 2017 12:15 PM CDT   Office Visit with Karla Palafox [facility] ([facility])    [address] [place] [id number] [phone]           Bring a current list of meds and any records pertaining to this visit.  For Physicals, please bring immunization records and any forms needing to be filled out.  Please arrive 10 minutes early to complete paperwork.            Apr 12, 2017 12:15 PM CDT   Return Visit with EMI Orta CNP   [facility]    [address] [place], [place] [address]   [address] [place] [id number] [phone]            May 12, 2017  9:45 AM CDT   Return Visit with EMI Orta CNP   [facility] ([facility])    [address] [place], [place] [address]   [address] [place] [address] [phone]            Jun 09, 2017  9:45 AM CDT   Return Visit with EMI Orta CNP   [facility]    [address] [place] [address]  [address] [place] [address]  "[phone]              Who to contact     If you have questions or need follow up information about today's clinic visit or your schedule please contact [facility] directly at [phone].  Normal or non-critical lab and imaging results will be communicated to you by MyChart, letter or phone within 4 business days after the clinic has received the results. If you do not hear from us within 7 days, please contact the clinic through MyChart or phone. If you have a critical or abnormal lab result, we will notify you by phone as soon as possible.  Submit refill requests through [employer] or call your pharmacy and they will forward the refill request to us. Please allow 3 business days for your refill to be completed.          Additional Information About Your Visit        [employer] Information     [employer] lets you send messages to your doctor, view your test results, renew your prescriptions, schedule appointments and more. To sign up, go to [email or web address] . Click on \"Log in\" on the left side of the screen, which will take you to the Welcome page. Then click on \"Sign up Now\" on the right side of the page.     You will be asked to enter the access code listed below, as well as some personal information. Please follow the directions to create your username and password.     Your access code is: [email or web address]  Expires: 2017 11:02 PM     Your access code will  in 90 days. If you need help or a new code, please call your [facility] clinic or [phone].        Care EveryWhere ID     This is your Care EveryWhere ID. This could be used by other organizations to access your [facility] medical records  [id number]        Your Vitals Were     Pulse Respirations Pulse Oximetry             74 18 96%          Blood Pressure from Last 3 Encounters:   17 120/64   02/10/17 140/60   17 138/78    Weight from Last 3 Encounters:   10/11/16 225 lb (102.1 kg)   10/11/16 226 lb " (102.5 kg)   04/22/16 204 lb 12.8 oz (92.9 kg)              Today, you had the following     No orders found for display         Today's Medication Changes          These changes are accurate as of: 3/1/17 11:59 PM.  If you have any questions, ask your nurse or doctor.               These medicines have changed or have updated prescriptions.        Dose/Directions    senna-docusate 8.6-50 MG per tablet   Commonly known as:  SENOKOT-S;PERICOLACE   This may have changed:  how much to take   Used for:  Constipation, unspecified constipation type   Changed by:  Karla Palafox RPH        Dose:  2 tablet   Take 2 tablets by mouth 2 times daily   Quantity:  100 tablet   Refills:  3       traMADol 50 MG tablet   Commonly known as:  ULTRAM   This may have changed:  how much to take   Used for:  Chronic TMJ pain   Changed by:  Karla Palafox RPH        Dose:  50 mg   Take 1 tablet (50 mg) by mouth 3 times daily as needed for moderate pain   Quantity:  90 tablet   Refills:  0                Primary Care Provider Office Phone # Fax #    EMI Orta [facility] [phone]       [facility] [address] [place] [address]        Thank you!     Thank you for choosing [facility] C[facility]  for your care. Our goal is always to provide you with excellent care. Hearing back from our patients is one way we can continue to improve our services. Please take a few minutes to complete the written survey that you may receive in the mail after your visit with us. Thank you!             Your Updated Medication List - Protect others around you: Learn how to safely use, store and throw away your medicines at www.disposemymeds.org.          This list is accurate as of: 3/1/17 11:59 PM.  Always use your most recent med list.                   Brand Name Dispense Instructions for use    * albuterol 108 (90 BASE) MCG/ACT Inhaler    PROAIR HFA/PROVENTIL HFA/VENTOLIN HFA     1 Inhaler    Inhale 2 puffs into the lungs every 6 hours as needed for shortness of breath / dyspnea       * albuterol (2.5 MG/3ML) 0.083% neb solution     540 mL    INHALE 1 VIAL BY NEBULIZATION EVERY 2 HOURS AS NEEDED FOR DYSPNEA       budesonide 0.5 MG/2ML neb solution    PULMICORT    60 mL    Take 2 mLs (0.5 mg) by nebulization 2 times daily       buPROPion 150 MG 24 hr tablet    WELLBUTRIN XL    90 tablet    TAKE ONE TABLET BY MOUTH EVERY NIGHT AT BEDTIME       DULoxetine 60 MG EC capsule    CYMBALTA    90 capsule    Take 1 capsule (60 mg) by mouth daily       formoterol 20 MCG/2ML neb solution    PERFOROMIST    2 mL    Take 2 mLs (20 mcg) by nebulization every 12 hours       gabapentin 300 MG capsule    NEURONTIN    120 capsule    TAKE TWO CAPSULES BY MOUTH TWICE DAILY       lidocaine 5 % ointment    XYLOCAINE    50 g    Apply topically 3 times daily as needed for moderate pain       MAPAP 500 MG tablet   Generic drug:  acetaminophen     240 tablet    Take 2 tablets (1,000 mg) by mouth 2 times daily       metoprolol 25 MG tablet    LOPRESSOR    90 tablet    TAKE HALF TABLET BY MOUTH TWICE DAILY       montelukast 10 MG tablet    SINGULAIR    90 tablet    TAKE ONE TABLET BY MOUTH EVERY NIGHT AT BEDTIME       omeprazole 20 MG CR capsule    priLOSEC    30 capsule    TAKE ONE CAPSULE BY MOUTH ONE TIME DAILY       order for DME      Equipment being ordered: Oxygen Titration visit [facility] Fax # [phone]       senna-docusate 8.6-50 MG per tablet    SENOKOT-S;PERICOLACE    100 tablet    Take 2 tablets by mouth 2 times daily       Simethicone 180 MG Caps     60 capsule    Take 1 capsule by mouth 2 times daily       tamsulosin 0.4 MG capsule    FLOMAX    90 capsule    TAKE ONE CAPSULE BY MOUTH EVERY DAY       tiotropium 18 MCG capsule    SPIRIVA HANDIHALER    30 capsule    INHALE 1 CAPSULE (18 MCG) BY INHALATION ROUTE ONCE DAILY       torsemide 10 MG tablet    DEMADEX    30 tablet    Take 1 tablet (10 mg)  by mouth daily       traMADol 50 MG tablet    ULTRAM    90 tablet    Take 1 tablet (50 mg) by mouth 3 times daily as needed for moderate pain       traZODone 50 MG tablet    DESYREL    90 tablet    Take 50mg at bedtime.       * Notice:  This list has 2 medication(s) that are the same as other medications prescribed for you. Read the directions carefully, and ask your doctor or other care provider to review them with you.

## 2017-03-01 NOTE — PROGRESS NOTES
SUBJECTIVE:                                                    Karan Vaz is a 84 year old male with significant for CHF, major depression, insomnia, glaucoma, panlobular emphysema, acute on chronic respiratory failure is being followed by the complex care program and is seen in the home today for the following health issues:     Leg cramps    Duration: years worse over the past 2 weeks  Description (location/character/radiation): worse in the morning, left leg.  Denies changes in diet, activity, and medications.  Drinks about 2 glasses of water per day and 4 glasses of apple juice.  Denies numbness, tingling in feet except for during the landen horse. Denies other bothersome symptoms. Walks around house; no other exercise routine        Intensity:  moderate    Accompanying signs and symptoms: as above    History (similar episodes/previous evaluation): as above    Precipitating or alleviating factors: None    Therapies tried and outcome: None       Fatigue       Duration: years but also worse in the last few months    Description (location/character/radiation):  Doesn't feel well rested after he sleeps. Is tired throughout the day. Low energy.     Intensity:   moderate    Accompanying signs and symptoms: multiple night time wakenings. Reminisces about his life in [place]. SOB.     History (similar episodes/previous evaluation): long standing issue worsened over the past few months. Has advanced COPD emphysema type, CHF.      Precipitating or alleviating factors: chronic illness.     Therapies tried and outcome: frequent rests. Trazodone to help with insomnia.        Chronic Pain Follow-Up       Type / Location of Pain: behind left ear, rubs his head  Analgesia/pain control:       Recent changes:  same      Overall control: Tolerable with discomfort  Activity level/function:      Daily activities:  Unable to perform most daily activities - chores, hobbies, social activities, driving    Work:  not  applicable  Adverse effects:  No  Adherance    Taking medication as directed?  Yes- taking Tramadol 1 tab BID  with mild-moderate relief.      Participating in other treatments: no  Risk Factors:    Sleep:  Poor    Mood/anxiety:  Anxiety /t his advanced illnesses, remising about his life.     Recent family or social stressors:  Stressed about leaving his wife when he dies. Who will care for her.     Other aggravating factors: none  PHQ-9 SCORE 7/2/2012 10/12/2015 10/25/2016   Total Score 19 - -   Total Score - 18 22     SIDNEY-7 SCORE 10/25/2016   Total Score 18     Encounter-Level CSA:     There are no encounter-level csa.             Constipation    This week, having BM every 1-2 days. Over the past month has had increased constipation with 3-4 days between BMs.On fiber and senekot once daily. Was started on Tramadol for chronic pain behind left ear.       Heart Failure Follow-up    Symptoms:    Shortness of breath: happens with rest and exertion - slightly worsened Pt is short of breath sitting and talking.oxygen dependent 3.5 L.    Lower extremity edema: stable     Chest pain: No    Using more pillows than normal: No    Cough at night: No    Weight:    Checking weight daily: No    Weight change: none    Cardiology visits, ER/UC, or hospital admissions since last visit: None    Medication side effects: none       End-of-life counseling  Discussed advantages of hospice care. Discussed legacy work that may benefit his mental health and sleep. Pt expressed not being ready to die especially because he worries about his wife being alone. Pt expressed thoughts and dreams about [place] and wishes to connect with family and old friends.      Problem list and histories reviewed & adjusted, as indicated.  Additional history: as documented    Patient Active Problem List   Diagnosis     Myoclonus     History of peptic ulcer disease     elastofibroma thoracic wall, benign     Restless legs syndrome (RLS)     Sensory neuropathy  (H)     Health Care Home     Hypertrophy of prostate with urinary retention     Carpal tunnel syndrome     Glaucoma     Degeneration of cervical intervertebral disc     Anal sphincter incompetence     ACP (advance care planning)     Primary insomnia     Panlobular emphysema (H)     Primary osteoarthritis of right shoulder     Bilateral edema of lower extremity     Essential hypertension     Acute on chronic respiratory failure (H)     Acute on chronic diastolic congestive heart failure (H)     Dysphagia     Gastroesophageal reflux disease, esophagitis presence not specified     Major depressive disorder, recurrent episode, moderate (H)     Abnormality of gait and mobility     Past Surgical History   Procedure Laterality Date     C shoulder surg proc unlisted       Shoulder     C shoulder surg proc unlisted       Shoulder     C shoulder surg proc unlisted       Shoulder     Hc revise median n/carpal tunnel surg  7/01/02     simonet, left     Hc revise median n/carpal tunnel surg  09/24/02     Dr Montiel left     Arthroplasty hip bilateral       Arthroscopy shoulder distal clavicle repair  5/30/2012     Procedure:ARTHROSCOPY SHOULDER DISTAL CLAVICLE RESECTION; Left shoulder arthroscopy, Bicep tenontomy, debridement of partial thickness cuff tear, Subacromial Decompression.; Surgeon:MONIE MENDIETA; Location: OR     [facility]. microwave thermotx  2012     dr Sin      Sigmoidoscopy flexible N/A 2/16/2016     Procedure: SIGMOIDOSCOPY FLEXIBLE;  Surgeon: Ky Small MD;  Location:  GI       Social History   Substance Use Topics     Smoking status: Former Smoker     Quit date: 1/1/1990     Smokeless tobacco: Never Used      Comment: 20 years ago     Alcohol use No     Family History   Problem Relation Age of Onset     Colon Cancer No family hx of          SH:    Amount of exercise or physical activity: None    Problems taking medications regularly: No    Medication side effects: none  Diet: regular (no  restrictions)    Reviewed and updated as needed this visit by clinical staff       Reviewed and updated as needed this visit by Provider         ROS:  Constitutional, HEENT, cardiovascular, pulmonary, GI, , musculoskeletal, neuro, skin, endocrine and psych systems are negative, except as otherwise noted.    OBJECTIVE:                                                    /64 (BP Location: Right arm, Patient Position: Chair, Cuff Size: Adult Regular)  Pulse 74  Resp 18  SpO2 96%  There is no height or weight on file to calculate BMI.  GENERAL: obese, elderly and fatigued. Short breath while speaking.   RESP: lungs clear to auscultation - no rales, rhonchi or wheezes  Sinuses: No tenderness under frontal or maxillary sinuses.  Ears: L TM mild scarring, white transluscent with no buldging, retraction or erythema. R TM white translucent with intact TM without buldging, retraction or erythema  CV: regular rate and rhythm, normal S1 S2, no S3 or S4, no murmur, click or rub, no peripheral edema and peripheral pulses strong  ABDOMEN: soft, nontender, no hepatosplenomegaly, no masses and bowel sounds normal  MS: no gross musculoskeletal defects noted, no edema    Diagnostic Test Results:  none      ASSESSMENT/PLAN:                                                    1. Acute on chronic diastolic congestive heart failure (H)  Worsened shortness of breath and fatigue, loss of appetite, CHF definitely contributing, but likely emphysema is culprit. He is not in fluid overload.  He was short of breath at rest just speaking with us today. Perhaps switching patient to morphine to help with dyspnea and pain would be beneficial though would likely  contribute to worsened somnolence. Long discussion surrounding goals of care and benefit of hospice. Hospice was out and accepted patient into the program later patient declined services. He is clearly not ready to die and he voiced this today.  Patient and wife agreed to accept  services today which I am very happy about.     2. Advanced directives, counseling/discussion  Greater than 16 mins spent discussing goals of care    3. Primary insomnia  Poor sleep hygiene, racing thoughts at night r/t his declining health.  On trazadone.     4. Muscle spasm  BMP  Increase fluids    5. Fatigue, unspecified type  Most probable etiology is progression of emphysema and congestive heart failure. He is also not getting a restful night sleep as above.       5. Pain of mastoid region, left  Unknown etiology. ENT thought it was r/t to TMJ dysfuntion. His CT head and Maxillofascial and Temporal Orbital CT were all negative. At this point I don't think any further workup will  given the context of his underlying advanced illnesses. Pain management is goal.  Increased Tramadol 50mg  TID PRN.    6. Constipation, unspecified constipation type  Senekot adjusted.            - Follow-up visit in 1 month    Anna Lara, Student CINDY, RN-BC, PHN    Supervised by:  EM[facility]  90 min spent in direct face to face time with this patient and wife,, student, pharm d in the home, greater than 50% in counseling advanced care planning as stated above and coordination of care as stated above.

## 2017-03-01 NOTE — PROGRESS NOTES
"SUBJECTIVE/OBJECTIVE:                                                    Karan Vaz is a 84 year old male seen at their home for a follow-up visit for Medication Therapy Management.  He was referred to me from Nidia Mitchell. Seen as a covisit with PCP and NP Student. Wife Britt also present for visit.      Chief Complaint: Follow up from our visit on 1/12/17.  Jaw pain, SOB, constipation  Personal Healthcare Goals: comfort care  Tobacco: History of tobacco dependence - quit 30 yrs ago   Alcohol: not currently using    Medication Adherence: no issues reported and has assistance setting up med boxes (daughter Zaynab)    COPD: Current medications: Albuterol MDI, Nebs BID and (Pt reports using albuterol MDI rarely but carries in pocket), Tiotropium (Spiriva) once daily, Arformoterol (Brovana) twice daily and budesonide BID. Pt rinses their mouth after using steroid inhaler.  Tried Anoro Ellipta but ineffective and prefers to continue nebs.  Cost of formoterol and Spiriva is an issue, about $150 each per month.   Pt is not experiencing side effects.   Pt reports the following symptoms: SOB with exertion, same as usual. Has times when he is so SOB that he is scared he will suffocate.    Spirometry has been completed, seen by Dr Oliveira at [facility]. He is not planning on going back at this time. Told \"nothing else I can do for you\"  Per 4/2016 provider note:  \"Pt has severe COPD, emphysema on CT and FEV1 of 44% predicted last year. He's had multiple hospitalizations for AECOPD with slower recovery each time.  He is on home O2 at 3 LPM and had significant LE edema which is now resolved although his 2016 echo did not show RV failure.  He uses a walker at home and has < 100 feet walking distance.  The only \"positive\" factor is that is PCO2 is not chronically elevated.   Overall he's had a downhill course despite max medical therapy and multiple hospitalizations.  His life expectancy over next 1-2  years is low " "(but not zero) and he's unlikely to return to good functional status.  At a minimum I recommended DNI status but ok to treat the next episode of respiratory failure with bipap, O2, nebs, antibiotics etc.  They will think this over and make decisions. \"  Depression/insomnia:  Current medications include: Duloxetine 60mg once daily, Bupropion XL 150mg once daily and trazodone 50mg daily. Pt reports that depression symptoms are unchanged. Continues to state sleep is his biggest complaint. Unsure trazodone has been helpful and complains of having trouble getting up in the morning. . Patient reports the following stressors: chronic health condition (RLS, COPD)  PHQ-9 SCORE 7/2/2012 10/12/2015 10/25/2016   Total Score 19 - -   Total Score - 18 22     constipation: currently taking fiber powder daily and Senna-S 1 tab BID. Also has been taking docusate 100mg 1-2 caps per day that was started over the counter because of ongoing constipation. States BM always hard, sometimes 3-4 days in between BM.   Jaw pain: currently taking tramadol 25mg BID. States it dulls down his pain somewhat but worried medication is keeping him awake so doesn't want to take more of it. Pain has continued to be problematic; keeps him awake at night. Also bothersome throughout the day.     Current labs include:  BP Readings from Last 3 Encounters:   03/01/17 120/64   02/10/17 140/60   01/12/17 138/78       Lab Results   Component Value Date    A1C 6.3 02/19/2016   .  Lab Results   Component Value Date    CHOL 212 06/23/2011     Lab Results   Component Value Date    TRIG 195 06/23/2011     Lab Results   Component Value Date    HDL 41 06/23/2011     Lab Results   Component Value Date     06/23/2011       Liver Function Studies -   Recent Labs   Lab Test  10/11/16   1620   PROTTOTAL  8.2   ALBUMIN  4.0   BILITOTAL  0.4   ALKPHOS  107   AST  33   ALT  23       No results found for: UCRR, MICROL, UMALCR    Last Basic Metabolic Panel:  Lab Results "   Component Value Date     10/11/2016      Lab Results   Component Value Date    POTASSIUM 4.6 10/11/2016     Lab Results   Component Value Date    CHLORIDE 101 10/11/2016     Lab Results   Component Value Date    BUN 21 10/11/2016    BUN NOT APPLICABLE 12/19/2014     Lab Results   Component Value Date    CR 0.94 10/11/2016     GFR Estimate   Date Value Ref Range Status   10/11/2016 76 >60 mL/min/1.7m2 Final     Comment:     Non  GFR Calc   04/18/2016 >90  Non  GFR Calc   >60 mL/min/1.7m2 Final   04/14/2016 >90  Non  GFR Calc   >60 mL/min/1.7m2 Final     GFR Estimate If Black   Date Value Ref Range Status   10/11/2016 >90   GFR Calc   >60 mL/min/1.7m2 Final   04/18/2016 >90   GFR Calc   >60 mL/min/1.7m2 Final   04/14/2016 >90   GFR Calc   >60 mL/min/1.7m2 Final     TSH   Date Value Ref Range Status   05/14/2014 1.15 0.40 - 4.50 mIU/L Final   ]    Most Recent Immunizations   Administered Date(s) Administered     Influenza (H1N1) 12/15/2009     Influenza (IIV3) 08/27/2012     Influenza Vaccine IM 3yrs+ 4 Valent IIV4 09/27/2016     Pneumococcal (PCV 13) 02/02/2015     Pneumococcal 23 valent 01/01/2002     TD (ADULT, 7+) 01/23/2007     Zoster vaccine, live 01/12/2011     ASSESSMENT:                                                    Current medications were reviewed today as discussed above.      Medication Adherence: no issues identified    COPD: unchanged. Hospice discussed in detail today, see PCP note for details. Hospice RN will be making a visit tomorrow. Discussed use of morphine oral drops for dyspnea but will hold off for hospice to provide medication and admin education.   Depression/insomnia: improved. Continue to monitor  constipation: needs improvement. Pt would benefit from increasing senna-S. Will d/c otc docusate, duplicate therapy.   Jaw pain: needs improvement. Pt would benefit from dose increase in  tramadol. Education provided on expected SE.        PLAN:                                                      D/c docusate. Increase Senna-S to 2 tablets twice a day  Increase tramadol to 50mg 3 times a day - OK per [facility]  Hospice referral    I spent 60 minutes with this patient today.  All changes were made via collaborative practice agreement with Nidia Mitchell. A copy of the visit note was provided to the patient's primary care provider.     Will follow up in 1 month.    The patient was mailed a summary of these recommendations as an after visit summary.    Karla Palafox, PharmD, BCACP

## 2017-03-01 NOTE — Clinical Note
Made some med changes and left a note for Zaynab in the notebook - are we supposed to call her too as part of the process now?

## 2017-03-02 NOTE — TELEPHONE ENCOUNTER
Spoke with [facility] regarding changes recommended by hospice director.    Discontinue the following:  Anora Elipta, Spiriva, and Brovana       Begin:  Duoneb QID scheduled and Q2h PRN    Begin Prednisone 10 mg QD.    Hospice will draw BMP tomorrow.  Uncertain this was ordered yet with [facility] so sent them a message stating hospice will complete.    Routing to provider as FYI.  No need to contact Grace unless not agreeable to the above recommendation.    Parvin Cardenas RN

## 2017-03-02 NOTE — TELEPHONE ENCOUNTER
Call from Grace with  Hospice.  Patient is signing onto hospice today and the hospice MDs would like to make some changes to COPD medication regimen.    Please call Grace back to discuss.    [phone]

## 2017-03-03 NOTE — TELEPHONE ENCOUNTER
Gary from  hospice calling to let us know pt's labs will not be drawn today as he could not get any blood. They will send a different nurse on Monday to draw labs. Routing to provider as an FYI.

## 2017-03-07 PROBLEM — Z71.89 ADVANCED DIRECTIVES, COUNSELING/DISCUSSION: Status: ACTIVE | Noted: 2017-01-01

## 2017-03-07 NOTE — PATIENT INSTRUCTIONS
Increase senekot 2 tablets twice daily.     You accepted to enroll in hospice I think this is a very good thing.

## 2017-03-30 NOTE — TELEPHONE ENCOUNTER
Called and set up xray through [facility].  They will xray patient's (L) foot and ankle in the patient's home.

## 2017-03-30 NOTE — MR AVS SNAPSHOT
After Visit Summary   3/30/2017    Karan Vaz    MRN: [id number]           Patient Information     Date Of Birth          10/7/1932        Visit Information        Provider Department      3/30/2017 9:45 AM Nidia Mitchell APRN CNP [facility]        Today's Diagnoses     Fall, initial encounter    -  1    ACP (advance care planning)        Acute on chronic diastolic congestive heart failure (H)          Care Instructions    Xray of left foot and ankle to r/o fracture            Follow-ups after your visit        Your next 10 appointments already scheduled     Apr 12, 2017 12:15 PM CDT   Office Visit with Karla Palafox [facility] ([facility])    [address] [place] [address]  [address] [place] [id number] [phone]           Bring a current list of meds and any records pertaining to this visit.  For Physicals, please bring immunization records and any forms needing to be filled out.  Please arrive 10 minutes early to complete paperwork.            Apr 14, 2017  9:45 AM CDT   Return Visit with EMI Orta CNP   [facility] ([facility])    [address]  [address] [place] [id number]   [phone]            May 12, 2017  9:45 AM CDT   Return Visit with EMI Orta CNP   [facility] ([facility])    [address]  [address] [place] [id number]   [phone]            Jun 09, 2017  9:45 AM CDT   Return Visit with EMI Orta CNP   [facility] ([facility])    [address]  [address] [place] [address]   [phone]              Who to contact     If you have questions or need follow up information about today's clinic visit or your schedule please contact [facility] directly at  "[phone].  Normal or non-critical lab and imaging results will be communicated to you by [employer], letter or phone within 4 business days after the clinic has received the results. If you do not hear from us within 7 days, please contact the clinic through [employer] or phone. If you have a critical or abnormal lab result, we will notify you by phone as soon as possible.  Submit refill requests through [employer] or call your pharmacy and they will forward the refill request to us. Please allow 3 business days for your refill to be completed.          Additional Information About Your Visit        [employer] Network Information     [employer] lets you send messages to your doctor, view your test results, renew your prescriptions, schedule appointments and more. To sign up, go to [email or web address] . Click on \"Log in\" on the left side of the screen, which will take you to the Welcome page. Then click on \"Sign up Now\" on the right side of the page.     You will be asked to enter the access code listed below, as well as some personal information. Please follow the directions to create your username and password.     Your access code is: [email or web address]  Expires: 2017 12:02 AM     Your access code will  in 90 days. If you need help or a new code, please call your [place] clinic or [phone].        Care EveryWhere ID     This is your [facility] EveryWhere ID. This could be used by other organizations to access your [place] medical records  [id number]         Blood Pressure from Last 3 Encounters:   17 120/64   02/10/17 140/60   17 138/78    Weight from Last 3 Encounters:   10/11/16 225 lb (102.1 kg)   10/11/16 226 lb (102.5 kg)   16 204 lb 12.8 oz (92.9 kg)               Primary Care Provider Office Phone # Fax #    EMI Orta [facility] [phone]       [facility] [address] [facility] [address] [place] [address]        Thank you!     Thank you for choosing [facility]  for your care. Our goal is always to provide you with excellent care. Hearing back from our patients is one way we can continue to improve our services. Please take a few minutes to complete the written survey that you may receive in the mail after your visit with us. Thank you!             Your Updated Medication List - Protect others around you: Learn how to safely use, store and throw away your medicines at www.disposemymeds.org.          This list is accurate as of: 3/30/17 11:59 PM.  Always use your most recent med list.                   Brand Name Dispense Instructions for use    * albuterol 108 (90 BASE) MCG/ACT Inhaler    PROAIR HFA/PROVENTIL HFA/VENTOLIN HFA    1 Inhaler    Inhale 2 puffs into the lungs every 6 hours as needed for shortness of breath / dyspnea       * albuterol (2.5 MG/3ML) 0.083% neb solution     540 mL    INHALE 1 VIAL BY NEBULIZATION EVERY 2 HOURS AS NEEDED FOR DYSPNEA       budesonide 0.5 MG/2ML neb solution    PULMICORT    60 mL    Take 2 mLs (0.5 mg) by nebulization 2 times daily       buPROPion 150 MG 24 hr tablet    WELLBUTRIN XL    90 tablet    TAKE ONE TABLET BY MOUTH EVERY NIGHT AT BEDTIME       DULoxetine 60 MG EC capsule    CYMBALTA    90 capsule    Take 1 capsule (60 mg) by mouth daily       formoterol 20 MCG/2ML neb solution    PERFOROMIST    2 mL    Take 2 mLs (20 mcg) by nebulization every 12 hours       gabapentin 300 MG capsule    NEURONTIN    120 capsule    TAKE TWO CAPSULES BY MOUTH TWICE DAILY       lidocaine 5 % ointment    XYLOCAINE    50 g    Apply topically 3 times daily as needed for moderate pain       MAPAP 500 MG tablet   Generic drug:  acetaminophen     240 tablet    Take 2 tablets (1,000 mg) by mouth 2 times daily       metoprolol 25 MG tablet    LOPRESSOR    90 tablet    TAKE HALF TABLET BY MOUTH TWICE DAILY       montelukast 10 MG tablet    SINGULAIR    90 tablet    TAKE ONE TABLET BY MOUTH EVERY NIGHT AT BEDTIME       omeprazole 20 MG  CR capsule    priLOSEC    30 capsule    TAKE ONE CAPSULE BY MOUTH ONE TIME DAILY       order for DME      Equipment being ordered: Oxygen Titration visit [facility] Fax # [phone]       senna-docusate 8.6-50 MG per tablet    SENOKOT-S;PERICOLACE    100 tablet    Take 2 tablets by mouth 2 times daily       Simethicone 180 MG Caps     60 capsule    Take 1 capsule by mouth 2 times daily       tamsulosin 0.4 MG capsule    FLOMAX    90 capsule    Take 1 capsule (0.4 mg) by mouth daily       tiotropium 18 MCG capsule    SPIRIVA HANDIHALER    30 capsule    INHALE 1 CAPSULE (18 MCG) BY INHALATION ROUTE ONCE DAILY       torsemide 10 MG tablet    DEMADEX    30 tablet    Take 1 tablet (10 mg) by mouth daily       traMADol 50 MG tablet    ULTRAM    90 tablet    Take 1 tablet (50 mg) by mouth 3 times daily as needed for moderate pain       traZODone 50 MG tablet    DESYREL    90 tablet    Take 50mg at bedtime.       * Notice:  This list has 2 medication(s) that are the same as other medications prescribed for you. Read the directions carefully, and ask your doctor or other care provider to review them with you.

## 2017-03-30 NOTE — PROGRESS NOTES
SUBJECTIVE:                                                    Karan Vaz is a 84 year old male with significant for CHF, major depression, insomnia, glaucoma, panlobular emphysema, acute on chronic respiratory failure is being followed by the complex care program and is seen in the home today for the following health issues:     Patient is hospice enrolled. He is on morphine 5 mg every 2 hrs PRN for dyspnea or pain which is helping. He is eating 2 meals a day.      Fall       Duration: 1 week ago     Description (location/character/radiation): patient fell twice while ambulating. He twisted his left foot.     Intensity:  moderate    Accompanying signs and symptoms: swelling, pain    History (similar episodes/previous evaluation): None    Precipitating or alleviating factors: elevation and ice    Therapies tried and outcome: elevation and ice not effective.      Constipation    Improved. BMs every 1-2 days. Is on daily laxatives.      Heart Failure Follow-up    Symptoms:    Shortness of breath: happens with rest and exertion. oxygen dependent 3 L.    Lower extremity edema: stable     Chest pain: No    Using more pillows than normal: No    Cough at night: No    Weight:    Checking weight daily: No    Weight change: none    Cardiology visits, ER/UC, or hospital admissions since last visit: None    Medication side effects: none       End-of-life counseling  Discussed advantages of hospice care. Discussed legacy work that may benefit his mental health and sleep. Pt expressed not being ready to die especially because he worries about his wife being alone. Pt expressed thoughts and dreams about Rohit and wishes to connect ith family and old friends.      Problem list and histories reviewed & adjusted, as indicated.  Additional history: as documented    Patient Active Problem List   Diagnosis     Myoclonus     History of peptic ulcer disease     elastofibroma thoracic wall, benign     Restless legs syndrome (RLS)      Sensory neuropathy (H)     Health Care Home     Hypertrophy of prostate with urinary retention     Carpal tunnel syndrome     Glaucoma     Degeneration of cervical intervertebral disc     Anal sphincter incompetence     ACP (advance care planning)     Primary insomnia     Panlobular emphysema (H)     Primary osteoarthritis of right shoulder     Bilateral edema of lower extremity     Essential hypertension     Acute on chronic respiratory failure (H)     Acute on chronic diastolic congestive heart failure (H)     Dysphagia     Gastroesophageal reflux disease, esophagitis presence not specified     Major depressive disorder, recurrent episode, moderate (H)     Abnormality of gait and mobility     Advanced directives, counseling/discussion     Past Surgical History:   Procedure Laterality Date     ARTHROPLASTY HIP BILATERAL       ARTHROSCOPY SHOULDER DISTAL CLAVICLE REPAIR  5/30/2012    Procedure:ARTHROSCOPY SHOULDER DISTAL CLAVICLE RESECTION; Left shoulder arthroscopy, Bicep tenontomy, debridement of partial thickness cuff tear, Subacromial Decompression.; Surgeon:MONIE MENDIETA; Location: OR      SHOULDER SURG PROC UNLISTED      Shoulder     C SHOULDER SURG PROC UNLISTED      Shoulder     C SHOULDER SURG PROC UNLISTED      Shoulder     HC REVISE MEDIAN N/CARPAL TUNNEL SURG  7/01/02    ezequiel, left     HC REVISE MEDIAN N/CARPAL TUNNEL SURG  09/24/02    Dr Montiel left     PROST. MICROWAVE THERMOTX  2012    dr Sin      SIGMOIDOSCOPY FLEXIBLE N/A 2/16/2016    Procedure: SIGMOIDOSCOPY FLEXIBLE;  Surgeon: Ky Small MD;  Location:  GI       Social History   Substance Use Topics     Smoking status: Former Smoker     Quit date: 1/1/1990     Smokeless tobacco: Never Used      Comment: 20 years ago     Alcohol use No     Family History   Problem Relation Age of Onset     Colon Cancer No family hx of          SH:    Amount of exercise or physical activity: None    Problems taking medications regularly:  No    Medication side effects: none  Diet: regular (no restrictions)    Reviewed and updated as needed this visit by clinical staff       Reviewed and updated as needed this visit by Provider         ROS:  Constitutional, HEENT, cardiovascular, pulmonary, GI, , musculoskeletal, neuro, skin, endocrine and psych systems are negative, except as otherwise noted.    OBJECTIVE:                                                    There were no vitals taken for this visit.  There is no height or weight on file to calculate BMI.  GENERAL: obese, elderly man with left raised on bench sitting at kitchen table.   RESP: lungs clear to auscultation - no rales, rhonchi or wheezes  CV: regular rate and rhythm, normal S1 S2, no S3 or S4, no murmur, click or rub.   ABDOMEN: soft, nontender, no hepatosplenomegaly, no masses and bowel sounds normal  MS: left ankle-swelling on top of foot, left malleolus tenderness. Some mild bruising. Unable to bear weight.     Diagnostic Test Results:  none      ASSESSMENT/PLAN:                                                    1. Fall, initial encounter  Had 2 falls last week while mobilizing with walker. Now unable to weight bear, left ankle and foot edematous with mild bruising and tenderness to malleolus. I am going ahead and will order xrays to rule out a fracture as he may benefit from a boot.   - XR Foot Left G/E 3 Views; Future  - XR Ankle Left G/E 3 Views; Future    2. ACP (advance care planning)  Hospice enrolled. Poor prognosis.     3. Acute on chronic diastolic congestive heart failure (H)  Advanced disease. Hospice enrolled.       EMI Osborn [facility]  60 min spent in direct face to face time with this patient and wife in the home, greater than 50% in counseling foot xray reviewing meds as stated above and coordination of care as stated above.

## 2017-04-03 NOTE — TELEPHONE ENCOUNTER
Britt called back. She said the patient is still having foot pain and swelling since his fall last week.  The patient is able to get to the toilet okay, but he wants to know why the foot is still swollen.    Per Britt, pt does not like to ice or elevate a lot, but he would like to know if there is a fracture or anything of note on his x-ray.  The patient took some morphine last night, which helps with the pain, but Britt said they don't want him to take too much. I advised he can take it the way Hospice nurse, Gary, explained to them and that is what it is there for.    I advised we will call them back once the x-ray report is sent to us and Britt voiced understanding.    Jocelyn Mireles RN

## 2017-04-03 NOTE — TELEPHONE ENCOUNTER
Britt called regarding . Stated that there were xrays done last week and she was told they would get a phone call and haven't. Would like someone to follow up with them as pt is still having pain in his foot.     Fwd to RN pool

## 2017-04-03 NOTE — TELEPHONE ENCOUNTER
Attempted to call pt's wife x 4 but line was busy.    Spoke with Gary, Hospice RN, who is seeing the pt tomorrow at 1:00 PM.  Gary said the pt had 20-30 syringes of morphine set up at last visit, but pt's wife may be reluctant to give it to him too frequently.    The patient and his wife were advised at last RN visit to elevate foot in hospital bed, use urinal if too painful to walk to the bathroom and to ice the foot.    Gary said they will likely start pt on methadone tomorrow for long-acting pain control. He will assess foot at that time as well.    Will try to reach Britt again later today.

## 2017-04-03 NOTE — TELEPHONE ENCOUNTER
X-ray Left Foot Complete, 3 or More Views  Bones/Joints: There is no acute bone abnormality  Soft Tissues: There is soft tissue swelling of the foot.    Impression:  1. There is soft tissue swelling of the foot  2. There is no acute bone abnormality      X-ray Left Ankle Complete, 3 or More Views    Findings: Bones/Joints: Unremarkable. No acute fracture. No dislocation  Soft tissues: Unremarkable.    Impression: Normal left ankle x-ray.      Informed pt's wife that no fracture was noted on his foot or ankle x-rays and she voiced understanding.    Routing to provider for her information tomorrow.    Jocelyn Mireles RN

## 2017-04-03 NOTE — TELEPHONE ENCOUNTER
Shanika called [facility] to request x-ray report. Once report is reviewed by provider we can call the pt's wife back.    Jocelyn Mireles RN

## 2017-04-11 NOTE — TELEPHONE ENCOUNTER
Received request for the following med:  Pending Prescriptions:                       Disp   Refills    DULoxetine (CYMBALTA) 60 MG EC capsule [Ph*90 cap*0        Sig: TAKE ONE CAPSULE BY MOUTH ONE TIME DAILY     All refill requests should be sent to PCP.  Routing to: Nidia Mitchell, [facility]

## 2017-04-13 NOTE — PROGRESS NOTES
"Clinic Care Coordination Contact  Care Team Conversations    Conversation with Zaynab Rodriguez to check in on services, supports, and communication with the clinic.     Zaynab reported communication with the [facility] has improved greatly. Communication with her parents has not been good as Zaynab stated Britt \"keeps her out of the loop.\"   SW provided clarification that Karan will continue to receive Complex Care services while being in Hospice. Zaynab, Britt, and Karan have met with the hospice social worker, who was assisting them with equipment.   Zaynab indicated Karan was continuing to get billed by [facility] for a hospital bed they rented and returned six months ago. Zaynab stated she called [employer] and was told they did not have records of the paperwork from the pickup. She is working with the hospice social worker on this and will continue to do so.   Zaynab indicated a great deal of stress in trying to help her parents coordinate services. MADDISON will continue to follow up in one month via phone for ongoing support.     ELIZABETH Velasquez  Social Work Care Coordinator  [facility], Mobile Team    "

## 2017-04-14 NOTE — MR AVS SNAPSHOT
"              After Visit Summary   4/14/2017    Karan Vaz    MRN: [id number]           Patient Information     Date Of Birth          10/7/1932        Visit Information        Provider Department      4/14/2017 9:45 AM Roshan Powell LICSW [facility]        Today's Diagnoses     Major depressive disorder, recurrent episode, moderate (H)    -  1       Follow-ups after your visit        Your next 10 appointments already scheduled     May 12, 2017  9:45 AM CDT   Return Visit with EMI Orta CNP   [facility]    [address]  [address]  [facility] [id number] [phone]            Jun 09, 2017  9:45 AM CDT   Return Visit with EMI Orta CNP   [facility]    [address]  [address]  [facility] [id number] [phone]              Who to contact     If you have questions or need follow up information about today's clinic visit or your schedule please contact [facility] directly at [phone].  Normal or non-critical lab and imaging results will be communicated to you by MyChart, letter or phone within 4 business days after the clinic has received the results. If you do not hear from us within 7 days, please contact the clinic through [employer] or phone. If you have a critical or abnormal lab result, we will notify you by phone as soon as possible.  Submit refill requests through [employer] or call your pharmacy and they will forward the refill request to us. Please allow 3 business days for your refill to be completed.          Additional Information About Your Visit        MyChart Information     [employer] lets you send messages to your doctor, view your test results, renew your prescriptions, schedule appointments and more. To sign up, go to [email or web address] . Click on \"Log in\" on the left side of the screen, which " "will take you to the Welcome page. Then click on \"Sign up Now\" on the right side of the page.     You will be asked to enter the access code listed below, as well as some personal information. Please follow the directions to create your username and password.     Your access code is: [email or web address]  Expires: 2017 12:02 AM     Your access code will  in 90 days. If you need help or a new code, please call your [place] clinic or [phone].        Care EveryWhere ID     This is your Care EveryWhere ID. This could be used by other organizations to access your [place] medical records  [id number]         Blood Pressure from Last 3 Encounters:   17 122/65   17 120/64   02/10/17 140/60    Weight from Last 3 Encounters:   10/11/16 225 lb (102.1 kg)   10/11/16 226 lb (102.5 kg)   16 204 lb 12.8 oz (92.9 kg)              Today, you had the following     No orders found for display       Primary Care Provider Office Phone # Fax #    Nidia EMI Esqueda [facility] [phone]       [facility] CARE [address] [place] [address]        Thank you!     Thank you for choosing [facility]  for your care. Our goal is always to provide you with excellent care. Hearing back from our patients is one way we can continue to improve our services. Please take a few minutes to complete the written survey that you may receive in the mail after your visit with us. Thank you!             Your Updated Medication List - Protect others around you: Learn how to safely use, store and throw away your medicines at www.disposemymeds.org.          This list is accurate as of: 17 11:59 PM.  Always use your most recent med list.                   Brand Name Dispense Instructions for use    * albuterol 108 (90 BASE) MCG/ACT Inhaler    PROAIR HFA/PROVENTIL HFA/VENTOLIN HFA    1 Inhaler    Inhale 2 puffs into the lungs every 6 hours as needed for shortness of " breath / dyspnea       * albuterol (2.5 MG/3ML) 0.083% neb solution     540 mL    INHALE 1 VIAL BY NEBULIZATION EVERY 2 HOURS AS NEEDED FOR DYSPNEA       budesonide 0.5 MG/2ML neb solution    PULMICORT    60 mL    Take 2 mLs (0.5 mg) by nebulization 2 times daily       buPROPion 150 MG 24 hr tablet    WELLBUTRIN XL    90 tablet    TAKE ONE TABLET BY MOUTH EVERY NIGHT AT BEDTIME       DULoxetine 60 MG EC capsule    CYMBALTA    90 capsule    TAKE ONE CAPSULE BY MOUTH ONE TIME DAILY       formoterol 20 MCG/2ML neb solution    PERFOROMIST    2 mL    Take 2 mLs (20 mcg) by nebulization every 12 hours       * gabapentin 300 MG capsule    NEURONTIN    120 capsule    TAKE TWO CAPSULES BY MOUTH TWICE DAILY       * gabapentin 300 MG capsule    NEURONTIN    120 capsule    TAKE TWO CAPSULES BY MOUTH TWICE DAILY       lidocaine 5 % ointment    XYLOCAINE    50 g    Apply topically 3 times daily as needed for moderate pain       MAPAP 500 MG tablet   Generic drug:  acetaminophen     240 tablet    Take 2 tablets (1,000 mg) by mouth 2 times daily       metoprolol 25 MG tablet    LOPRESSOR    90 tablet    TAKE HALF TABLET BY MOUTH TWICE DAILY       montelukast 10 MG tablet    SINGULAIR    90 tablet    TAKE ONE TABLET BY MOUTH EVERY NIGHT AT BEDTIME       omeprazole 20 MG CR capsule    priLOSEC    30 capsule    TAKE ONE CAPSULE BY MOUTH ONE TIME DAILY       order for DME      Equipment being ordered: Oxygen Titration visit [facility] Fax # [phone]       senna-docusate 8.6-50 MG per tablet    SENOKOT-S;PERICOLACE    100 tablet    Take 2 tablets by mouth 2 times daily       Simethicone 180 MG Caps     60 capsule    Take 1 capsule by mouth 2 times daily       tamsulosin 0.4 MG capsule    FLOMAX    90 capsule    Take 1 capsule (0.4 mg) by mouth daily       tiotropium 18 MCG capsule    SPIRIVA HANDIHALER    30 capsule    INHALE 1 CAPSULE (18 MCG) BY INHALATION ROUTE ONCE DAILY       torsemide 10 MG tablet    DEMADEX    30 tablet     Take 1 tablet (10 mg) by mouth daily       traMADol 50 MG tablet    ULTRAM    90 tablet    Take 1 tablet (50 mg) by mouth 3 times daily as needed for moderate pain       traZODone 50 MG tablet    DESYREL    90 tablet    Take 50mg at bedtime.       * Notice:  This list has 4 medication(s) that are the same as other medications prescribed for you. Read the directions carefully, and ask your doctor or other care provider to review them with you.

## 2017-04-14 NOTE — MR AVS SNAPSHOT
After Visit Summary   4/14/2017    Karan Vza    MRN: [id number]           Patient Information     Date Of Birth          10/7/1932        Visit Information        Provider Department      4/14/2017 9:45 AM Nidia Mitchell APRN CNP [facility]        Today's Diagnoses     Acute on chronic diastolic congestive heart failure (H)    -  1    ACP (advance care planning)        Fall, initial encounter          Care Instructions    Today's visit:    Happy to see your ankle has improved. Continue to ice and elevate.       We will be back 5/12 at 9:45 am.        Follow-ups after your visit        Your next 10 appointments already scheduled     May 12, 2017  9:45 AM CDT   Return Visit with EMI Orta CNP   [facility]    [address]  [address]  [facility] [id number] [phone]            Jun 09, 2017  9:45 AM CDT   Return Visit with EMI Orta CNP   [facility] ([facility])    [address]  [address]  [facility] [id number] [phone]              Who to contact     If you have questions or need follow up information about today's clinic visit or your schedule please contact [facility] directly at [phone].  Normal or non-critical lab and imaging results will be communicated to you by MyChart, letter or phone within 4 business days after the clinic has received the results. If you do not hear from us within 7 days, please contact the clinic through MyChart or phone. If you have a critical or abnormal lab result, we will notify you by phone as soon as possible.  Submit refill requests through [employer] or call your pharmacy and they will forward the refill request to us. Please allow 3 business days for your refill to be completed.          Additional Information About Your Visit        MyChart Information      "[employer] lets you send messages to your doctor, view your test results, renew your prescriptions, schedule appointments and more. To sign up, go to [email or web address] . Click on \"Log in\" on the left side of the screen, which will take you to the Welcome page. Then click on \"Sign up Now\" on the right side of the page.     You will be asked to enter the access code listed below, as well as some personal information. Please follow the directions to create your username and password.     Your access code is: [email or web address]  Expires: 2017 12:02 AM     Your access code will  in 90 days. If you need help or a new code, please call your [place] clinic or [phone].        Care EveryWhere ID     This is your [facility] EveryWhere ID. This could be used by other organizations to access your [place] medical records  [id number]        Your Vitals Were     Pulse Respirations Pulse Oximetry             80 20 94%          Blood Pressure from Last 3 Encounters:   17 122/65   17 120/64   02/10/17 140/60    Weight from Last 3 Encounters:   10/11/16 225 lb (102.1 kg)   10/11/16 226 lb (102.5 kg)   16 204 lb 12.8 oz (92.9 kg)              We Performed the Following     DNR/DNI        Primary Care Provider Office Phone # Fax #    Nidia EMI Esqueda [facility] [phone]       [facility] CARE [address] [place] [address] [address]  [facility] [address]        Thank you!     Thank you for choosing [facility]  for your care. Our goal is always to provide you with excellent care. Hearing back from our patients is one way we can continue to improve our services. Please take a few minutes to complete the written survey that you may receive in the mail after your visit with us. Thank you!             Your Updated Medication List - Protect others around you: Learn how to safely use, store and throw away your medicines at www.disposemymeds.org.          This list is accurate as of: " 4/14/17 11:59 PM.  Always use your most recent med list.                   Brand Name Dispense Instructions for use    * albuterol 108 (90 BASE) MCG/ACT Inhaler    PROAIR HFA/PROVENTIL HFA/VENTOLIN HFA    1 Inhaler    Inhale 2 puffs into the lungs every 6 hours as needed for shortness of breath / dyspnea       * albuterol (2.5 MG/3ML) 0.083% neb solution     540 mL    INHALE 1 VIAL BY NEBULIZATION EVERY 2 HOURS AS NEEDED FOR DYSPNEA       budesonide 0.5 MG/2ML neb solution    PULMICORT    60 mL    Take 2 mLs (0.5 mg) by nebulization 2 times daily       buPROPion 150 MG 24 hr tablet    WELLBUTRIN XL    90 tablet    TAKE ONE TABLET BY MOUTH EVERY NIGHT AT BEDTIME       DULoxetine 60 MG EC capsule    CYMBALTA    90 capsule    TAKE ONE CAPSULE BY MOUTH ONE TIME DAILY       formoterol 20 MCG/2ML neb solution    PERFOROMIST    2 mL    Take 2 mLs (20 mcg) by nebulization every 12 hours       * gabapentin 300 MG capsule    NEURONTIN    120 capsule    TAKE TWO CAPSULES BY MOUTH TWICE DAILY       * gabapentin 300 MG capsule    NEURONTIN    120 capsule    TAKE TWO CAPSULES BY MOUTH TWICE DAILY       lidocaine 5 % ointment    XYLOCAINE    50 g    Apply topically 3 times daily as needed for moderate pain       MAPAP 500 MG tablet   Generic drug:  acetaminophen     240 tablet    Take 2 tablets (1,000 mg) by mouth 2 times daily       metoprolol 25 MG tablet    LOPRESSOR    90 tablet    TAKE HALF TABLET BY MOUTH TWICE DAILY       montelukast 10 MG tablet    SINGULAIR    90 tablet    TAKE ONE TABLET BY MOUTH EVERY NIGHT AT BEDTIME       omeprazole 20 MG CR capsule    priLOSEC    30 capsule    TAKE ONE CAPSULE BY MOUTH ONE TIME DAILY       order for DME      Equipment being ordered: Oxygen Titration visit [facility] Fax # [phone]       senna-docusate 8.6-50 MG per tablet    SENOKOT-S;PERICOLACE    100 tablet    Take 2 tablets by mouth 2 times daily       Simethicone 180 MG Caps     60 capsule    Take 1 capsule by mouth 2  times daily       tamsulosin 0.4 MG capsule    FLOMAX    90 capsule    Take 1 capsule (0.4 mg) by mouth daily       tiotropium 18 MCG capsule    SPIRIVA HANDIHALER    30 capsule    INHALE 1 CAPSULE (18 MCG) BY INHALATION ROUTE ONCE DAILY       torsemide 10 MG tablet    DEMADEX    30 tablet    Take 1 tablet (10 mg) by mouth daily       traMADol 50 MG tablet    ULTRAM    90 tablet    Take 1 tablet (50 mg) by mouth 3 times daily as needed for moderate pain       traZODone 50 MG tablet    DESYREL    90 tablet    Take 50mg at bedtime.       * Notice:  This list has 4 medication(s) that are the same as other medications prescribed for you. Read the directions carefully, and ask your doctor or other care provider to review them with you.

## 2017-04-14 NOTE — PATIENT INSTRUCTIONS
Today's visit:    Happy to see your ankle has improved. Continue to ice and elevate.       We will be back 5/12 at 9:45 am.

## 2017-04-14 NOTE — PROGRESS NOTES
[facility] - Complex Care Mobile  April 14, 2017    Behavioral Health Clinician Progress Note    Patient Name: Karan Vaz           Service Type: Individual      Service Location:   Face to Face in Home / Community     Session Start Time: 9:45 a.m.  Session End Time: 10:30 a.m.      Session Length: 38 - 52      Attendees: Patient, PCP, Spouse / Significant Other and MA    Visit Activities (Refresh list every visit): [facility]    Diagnostic Assessment Date: 12/8/16  Treatment Plan Review Date: 7/14/17  See Flowsheets for today's PHQ-9 and SIDNEY-7 results  Previous PHQ-9:   PHQ-9 SCORE 7/2/2012 10/12/2015 10/25/2016   Total Score 19 - -   Total Score - 18 22     Previous SIDNEY-7:   SIDNEY-7 SCORE 10/25/2016   Total Score 18       TANNER LEVEL:  TANNER Score (Last Two) 3/25/2016   TANNER Raw Score 0   Activation Score 0   TANNER Level 1       DATA  Extended Session (60+ minutes): No  Interactive Complexity: No  Crisis: No    Treatment Objective(s) Addressed in This Session:  Target Behavior(s): disease management/lifestyle changes depression    Depressed Mood: Increase interest, engagement, and pleasure in doing things  Decrease frequency and intensity of feeling down, depressed, hopeless  Improve quantity and quality of night time sleep / decrease daytime naps  Feel less tired and more energy during the day   Identify negative self-talk and behaviors: challenge core beliefs, myths, and actions  Decrease thoughts that you'd be better off dead or of suicide / self-harm    Current Stressors / Issues:  [facility] co-visit with patient, his wife, PCP, and MA. Patient appeared calm and was finishing breakfast upon arrival. Patient reports he is feeling improvement with his breathing today, and that he is pleased with hospice care. Patient continues to have difficulty staying asleep at night, and is unsure whether it is important to change this. Patient's wife appeared anxious and irritable, reporting increased frustration with caring for  "her . She expressed difficulty allowing others to provide cares, being uncertain that they are thorough enough. Patient's wife advised that she tends to worry about many things, especially the appearance of her home and making sure \"everything is in order\". Patient and his wife have discussed respite care options so she can take time for herself.  Patient reports his mood is somewhat improved and that it is reassuring to have hospice care.     Progress on Treatment Objective(s) / Homework:  New Objective established this session - PREPARATION (Decided to change - considering how); Intervened by negotiating a change plan and determining options / strategies for behavior change, identifying triggers, exploring social supports, and working towards setting a date to begin behavior change    Motivational Interviewing    MI Intervention: Co-Developed Goal: review/update psychiatric medications, Expressed Empathy/Understanding, Supported Autonomy, Collaboration, Evocation, Permission to raise concern or advise, Open-ended questions and Reflections: simple and complex     Change Talk Expressed by the Patient: Desire to change Ability to change Reasons to change Need to change    Provider Response to Change Talk: E - Evoked more info from patient about behavior change, A - Affirmed patient's thoughts, decisions, or attempts at behavior change, R - Reflected patient's change talk and S - Summarized patient's change talk statements      Care Plan review completed: No    Medication Review:  No changes to current psychiatric medication(s)     Medication Compliance:  Yes    Changes in Health Issues:  Please see medical chart.    Chemical Use Review:   Substance Use: Chemical use reviewed, no active concerns identified      Tobacco Use: No current tobacco use.      Assessment: Current Emotional / Mental Status (status of significant symptoms):  Risk status (Self / Other harm or suicidal ideation)  Patient denies a history " of suicidal ideation, suicide attempts, self-injurious behavior, homicidal ideation, homicidal behavior and and other safety concerns  Patient denies current fears or concerns for personal safety.  Patient denies current or recent suicidal ideation or behaviors.  Patient denies current or recent homicidal ideation or behaviors.  Patient denies current or recent self injurious behavior or ideation.  Patient denies other safety concerns.  A safety and risk management plan has not been developed at this time, however patient was encouraged to call [place] [phone] should there be a change in any of these risk factors.      Appearance:   Appropriate   Eye Contact:   Good   Psychomotor Behavior: Normal   Attitude:   Cooperative   Orientation:   All  Speech   Rate / Production: Normal    Volume:  Normal   Mood:    Normal  Affect:    Appropriate   Thought Content:  Clear   Thought Form:  Coherent  Logical   Insight:    Fair     Diagnoses:  1. Major depressive disorder, recurrent episode, moderate (H)        Collateral Reports Completed:  Routed note to PCP    Plan: (Homework, other):  Patient was given information about behavioral services and will schedule a follow up visit as needed. He was also given information about mental health symptoms and treatment options , deep Breathing Strategies to practice when experiencing anxiety and depression and sleep hygiene strategies.  CD Recommendations: No indications of CD issues.       Roshan Powell, [facility], [facility]                                                    Treatment Plan    Client's Name: Karan Vaz  YOB: 1932    Date Updated: 4/14/17    DSM-V Diagnoses: 296.32 Major Depressive Disorder, Recurrent Episode, Moderate With anxious distress  Psychosocial / Contextual Factors: patient lives at home with his wife, who is also his primary caregiver. It is increasingly difficult for her to care for him, and they are open to home care if it's covered by insurance.  Patient's primary goal is to remain in his home.  WHODAS: 58    Referral / Collaboration:  Was/were discussed and client will pursue: [facility] referral    Anticipated number of session or this episode of care: 12-24 per year      MeasurableTreatment Goal(s) related to diagnosis / functional impairment(s)  Goal 1: Client will manage anxiety and depression by taking medications as prescribed and practicing adaptive coping skills to manage SOB.    I will know I've met my goal when I can sleep and not worry so much.      Objective #A (Client Action)    Client will Decrease frequency and intensity of feeling down, depressed, hopeless  Improve quantity and quality of night time sleep / decrease daytime naps  Feel less tired and more energy during the day   Identify negative self-talk and behaviors: challenge core beliefs, myths, and actions  Decrease thoughts that you'd be better off dead or of suicide / self-harm.  Status: Continued - Date(s): 4/14/17     Intervention(s)  Therapist will teach mindful breathing and meditation to induce relaxation.    Objective #B  Client will Increase interest, engagement, and pleasure in doing things  Decrease frequency and intensity of feeling down, depressed, hopeless  Feel less tired and more energy during the day   Identify negative self-talk and behaviors: challenge core beliefs, myths, and actions.  Status: Continued - Date(s): 4/14/17     Intervention(s)  Therapist will provide educational materials on sleep hygiene.    Client has reviewed and agreed to the above plan.      ATILIO Patel, LICSW  Reviewed/Revised: 4/14/17

## 2017-04-14 NOTE — PROGRESS NOTES
SUBJECTIVE:                                                    Karan Vaz is a 84 year old male with significant for CHF, major depression, insomnia, glaucoma, panlobular emphysema, acute on chronic respiratory failure is being followed by the complex care program and is seen in the home today for the following health issues:     Patient is hospice enrolled. He is on morphine 5 mg every 2 hrs PRN for dyspnea or pain which is helping. Wife says that he hasn't been needing morphine lately as he's been breathing well.  He is eating 2 meals a day.  Has some trouble sleeping and sometimes he doesn't fall asleep until 4 am. However wife says that she doesn't notice this.   Hospice is looking into getting patient's wife respite. Wife and patient is worried about who is paying for this service.     Fall       Duration: few days ago    Description (location/character/radiation): 2nd fall within the last few weeks. Fell while mobilizing with walker. He denies any injuries.     Intensity:  moderate    Accompanying signs and symptoms: swelling, pain    History (similar episodes/previous evaluation): Had a previous fall last week, he injured his left ankle and had an xray and there was no fracture.     Precipitating or alleviating factors: elevation and ice    Therapies tried and outcome: elevation and ice not effective.      Constipation    Improved. On senekot and miralax daily and having regular BMs every 1-2 days.       Heart Failure Follow-up    Symptoms:    Shortness of breath: happens with rest and exertion. oxygen dependent 3 L.    Lower extremity edema: stable     Chest pain: No    Using more pillows than normal: No    Cough at night: No    Weight:    Checking weight daily: No    Weight change: none    Cardiology visits, ER/UC, or hospital admissions since last visit: None    Medication side effects: none     Problem list and histories reviewed & adjusted, as indicated.  Additional history: as  documented    Patient Active Problem List   Diagnosis     Myoclonus     History of peptic ulcer disease     elastofibroma thoracic wall, benign     Restless legs syndrome (RLS)     Sensory neuropathy (H)     Health Care Home     Hypertrophy of prostate with urinary retention     Carpal tunnel syndrome     Glaucoma     Degeneration of cervical intervertebral disc     Anal sphincter incompetence     ACP (advance care planning)     Primary insomnia     Panlobular emphysema (H)     Primary osteoarthritis of right shoulder     Bilateral edema of lower extremity     Essential hypertension     Acute on chronic respiratory failure (H)     Acute on chronic diastolic congestive heart failure (H)     Dysphagia     Gastroesophageal reflux disease, esophagitis presence not specified     Major depressive disorder, recurrent episode, moderate (H)     Abnormality of gait and mobility     Advanced directives, counseling/discussion     Past Surgical History:   Procedure Laterality Date     ARTHROPLASTY HIP BILATERAL       ARTHROSCOPY SHOULDER DISTAL CLAVICLE REPAIR  5/30/2012    Procedure:ARTHROSCOPY SHOULDER DISTAL CLAVICLE RESECTION; Left shoulder arthroscopy, Bicep tenontomy, debridement of partial thickness cuff tear, Subacromial Decompression.; Surgeon:MONIE MENDIETA; Location:[facility] SHOULDER SURG PROC UNLISTED      Shoulder     C SHOULDER SURG PROC UNLISTED      Shoulder     C SHOULDER SURG PROC UNLISTED      Shoulder     HC REVISE MEDIAN N/CARPAL TUNNEL SURG  7/01/02    ezequiel, left     HC REVISE MEDIAN N/CARPAL TUNNEL SURG  09/24/02    Dr Montiel left     PROST. MICROWAVE THERMOTX  2012    dr Sin      SIGMOIDOSCOPY FLEXIBLE N/A 2/16/2016    Procedure: SIGMOIDOSCOPY FLEXIBLE;  Surgeon: Ky Small MD;  Location:  GI       Social History   Substance Use Topics     Smoking status: Former Smoker     Quit date: 1/1/1990     Smokeless tobacco: Never Used      Comment: 20 years ago     Alcohol use No     Family  History   Problem Relation Age of Onset     Colon Cancer No family hx of          SH:    Amount of exercise or physical activity: None    Problems taking medications regularly: No    Medication side effects: none  Diet: regular (no restrictions)    Reviewed and updated as needed this visit by clinical staff       Reviewed and updated as needed this visit by Provider         ROS:  Constitutional, HEENT, cardiovascular, pulmonary, GI, , musculoskeletal, neuro, skin, endocrine and psych systems are negative, except as otherwise noted.    OBJECTIVE:                                                    /65 (BP Location: Right arm, Cuff Size: Adult Regular)  Pulse 80  Resp 20  SpO2 94%  There is no height or weight on file to calculate BMI.  GENERAL: obese, elderly man with left raised on bench sitting at kitchen table eating breakfast.  RESP: breathing comfortably. lungs clear to auscultation - no rales, rhonchi or wheezes  CV: regular rate and rhythm, normal S1 S2, no S3 or S4, no murmur, click or rub.   ABDOMEN: soft, nontender, no hepatosplenomegaly, no masses and bowel sounds normal  MS: left ankle improved less swelling no bruising.       Diagnostic Test Results:  none      ASSESSMENT/PLAN:                                                       1. Acute on chronic diastolic congestive heart failure (H)  Advanced disease. Dyspnea has improved with PRN morphine. Hospice enrolled.     2. ACP (advance care planning)  Changed in Epic   - DNR/DNI    3. Fall, initial encounter  2nd fall within the last 2 weeks. No injuries noted. His ankle is improved since last visit.       EMI Osborn [facility]  50 min spent in direct face to face time with this patient and wife in the home, greater than 50% in counseling fall, CHF meds  as stated above and coordination of care as stated above.

## 2017-05-03 PROBLEM — R29.6 FREQUENT FALLS: Status: ACTIVE | Noted: 2017-01-01

## 2017-05-03 NOTE — IP AVS SNAPSHOT
Karan Vaz #[id number] (CSN: [id number])  (84 year old M)  (Adm: 17)     [id number]               [facility] OBSERVATION DEPARTMENT: [phone]            Patient Demographics     Patient Name Sex          Age SSN Address Phone    Karan Vaz Male 10/7/1932 (84 year old) [id number] [facility] [address] [phone] (Home)  none (Work)  none (Mobile)      Emergency Contact(s)     Name Relation Home Work Mobile    Britt Vaz Spouse [phone] none none    Zaynab Low Daughter [phone] none [phone]    Hubert Vaz Son [phone] none [phone]      Admission Information     Attending Provider Admitting Provider Admission Type Admission Date/Time    Fred Britt MD Young, Fred Osorio MD Emergency 17  0841    Discharge Date Hospital Service Auth/Cert Status Service Area     Observation Incomplete [facility]    Unit Room/Bed Admission Status        OBSERVATION DEPT  Admission (Confirmed)       Admission     Complaint    Frequent falls      Hospital Account     Name Acct ID Class Status Primary Coverage    Karan Vaz [id number] Observation Open UCARE - [employer]            Guarantor Account (for Hospital Account #[id number])     Name Relation to Pt Service Area Active? Acct Type    Caridadalfredo Karan EDGARD Self FCS Yes Personal/Family    Address Phone          [address] [place] [address] [phone](H)  none(O)              Coverage Information (for Hospital Account #[id number])     F/O Payor/Plan Precert #    UCARE/UCARE FOR SENIORS     Subscriber Subscriber #    Karan Vaz [id number]    Address Phone    [address]  [place] [id number] [phone]                                                INTERAGENCY TRANSFER FORM - PHYSICIAN ORDERS   5/3/2017                       [facility] OBSERVATION DEPARTMENT: [phone]            Attending  "Provider: Fred Britt MD     Allergies:  No Known Allergies    Infection:  None   Service:  Observation    Ht:  1.651 m (5' 5\")   Wt:  --   Admission Wt:  --    BMI:  --   BSA:  --            ED Clinical Impression     Diagnosis Description Comment Added By Time Added    Closed head injury, initial encounter [S09.90XA] Closed head injury, initial encounter [S09.90XA]  Sal Eugene MD 5/3/2017  1:10 PM    Closed displaced fracture of middle phalanx of left index finger, initial encounter [S62.621A] Closed displaced fracture of middle phalanx of left index finger, initial encounter [S62.621A]  Sal Eugene MD 5/3/2017  1:10 PM    Generalized muscle weakness [M62.81] Generalized muscle weakness [M62.81]  Sal Eugene MD 5/3/2017  1:10 PM      Hospital Problems as of 5/4/2017              Priority Class Noted POA    Frequent falls Medium  5/3/2017 Yes      Non-Hospital Problems as of 5/4/2017              Priority Class Noted    Myoclonus   Unknown    History of peptic ulcer disease   5/16/2006    elastofibroma thoracic wall, benign   1/9/2007    Restless legs syndrome (RLS)   3/8/2010    Sensory neuropathy (H)   6/8/2011    [facility]  5/31/2012    Hypertrophy of prostate with urinary retention   6/18/2012    Carpal tunnel syndrome   1/21/2013    Glaucoma   5/22/2013    Degeneration of cervical intervertebral disc   5/28/2013    Anal sphincter incompetence Medium  9/4/2015    ACP (advance care planning)   9/23/2015    Primary insomnia Medium  11/2/2015    Panlobular emphysema (H) Medium  12/14/2015    Primary osteoarthritis of right shoulder Medium  1/19/2016    Bilateral edema of lower extremity Medium  2/25/2016    Essential hypertension Medium  3/7/2016    Acute on chronic respiratory failure (H) Medium  4/11/2016    Acute on chronic diastolic congestive heart failure (H) Medium  4/11/2016    Dysphagia Medium  4/15/2016    Gastroesophageal reflux disease, esophagitis " presence not specified Medium  6/20/2016    Major depressive disorder, recurrent episode, moderate (H) Medium  11/29/2016    Abnormality of gait and mobility Medium  2/7/2017    Advanced directives, counseling/discussion Medium  3/7/2017      Code Status History     Date Active Date Inactive Code Status Order ID Comments User Context    5/4/2017  3:19 PM  DNR/DNI [id number]  Clotilde Huerta PA-C Outpatient    4/14/2017  9:57 AM 5/4/2017  3:19 PM DNR/DNI [id number]  Nidia Mitchell APRN CNP Outpatient    4/9/2016  9:54 AM 4/14/2017  9:57 AM Full Code [id number]  Otilio Ramey MD Outpatient    4/2/2016  2:43 AM 4/9/2016  9:54 AM Full Code [id number]  Newton Brock, DO Inpatient    2/24/2016  1:26 PM 4/2/2016  2:43 AM DNR/DNI [id number]  Tyler Crowder MD Outpatient    2/22/2016  1:37 PM 2/24/2016  1:26 PM DNR/DNI [id number]  Drew Brown, DO Inpatient    2/20/2016 12:33 PM 2/22/2016  1:37 PM DNI [id number]  Drew Brown, DO Inpatient    2/19/2016  1:41 AM 2/20/2016 12:33 PM Full Code [id number]  Josiah Florian MD Inpatient    4/6/2013  8:36 PM 4/10/2013  3:04 PM Full Code [id number]  Radha Mackay RN Inpatient    5/30/2012  2:59 PM 6/1/2012  4:04 PM Full Code [id number]  Mikala Hoover RN Inpatient    5/30/2012 12:44 PM 5/30/2012  2:59 PM Full Code [id number]  Gopi Huerta MD Outpatient      Current Code Status     Date Active Code Status Order ID Comments User Context       Prior      Summary of Visit     Reason for your hospital stay       Patient was admitted for right knee pain after fall. No fractures were identified on imaging. He was too weak to return home and was discharged to TCU for rehab with hopeful potential to return home.                Medication Review      UNREVIEWED medications. Ask your doctor about these medications        Dose / Directions Comments    * traMADol 50 MG tablet   Commonly known as:  ULTRAM        Dose:  25 mg   Take 25 mg by mouth  nightly as needed for moderate pain   Refills:  0        * traMADol 50 MG tablet   Commonly known as:  ULTRAM   Used for:  Chronic TMJ pain        Dose:  50 mg   Take 1 tablet (50 mg) by mouth 3 times daily as needed for moderate pain   Quantity:  90 tablet   Refills:  0        * Notice:  This list has 2 medication(s) that are the same as other medications prescribed for you. Read the directions carefully, and ask your doctor or other care provider to review them with you.      START taking        Dose / Directions Comments    hydrOXYzine 25 MG tablet   Commonly known as:  ATARAX   Used for:  Acute pain of right knee        Dose:  25 mg   Take 1 tablet (25 mg) by mouth every 6 hours as needed for other (adjuvant pain)   Quantity:  20 tablet   Refills:  0        oxyCODONE 5 MG IR tablet   Commonly known as:  ROXICODONE   Used for:  Acute pain of right knee        Dose:  2.5-5 mg   Take 0.5-1 tablets (2.5-5 mg) by mouth every 3 hours as needed for moderate to severe pain   Quantity:  20 tablet   Refills:  0          CONTINUE these medications which may have CHANGED, or have new prescriptions. If we are uncertain of the size of tablets/capsules you have at home, strength may be listed as something that might have changed.        Dose / Directions Comments    MAPAP 500 MG tablet   This may have changed:  Another medication with the same name was removed. Continue taking this medication, and follow the directions you see here.   Used for:  Other chronic pain   Generic drug:  acetaminophen        Dose:  1000 mg   Take 2 tablets (1,000 mg) by mouth 2 times daily   Quantity:  240 tablet   Refills:  0          CONTINUE these medications which have NOT CHANGED        Dose / Directions Comments    * albuterol 108 (90 BASE) MCG/ACT Inhaler   Commonly known as:  PROAIR HFA/PROVENTIL HFA/VENTOLIN HFA   Used for:  Mild intermittent asthma without complication        Dose:  2 puff   Inhale 2 puffs into the lungs every 6 hours as  needed for shortness of breath / dyspnea   Quantity:  1 Inhaler   Refills:  6        * albuterol (2.5 MG/3ML) 0.083% neb solution   Used for:  Moderate persistent asthma without complication        INHALE 1 VIAL BY NEBULIZATION EVERY 2 HOURS AS NEEDED FOR DYSPNEA   Quantity:  540 mL   Refills:  0        atropine 1 % ophthalmic solution        Dose:  2-4 drop   Take 2-4 drops by mouth every 2 hours as needed for secretions   Refills:  0        bisacodyl 10 MG Suppository   Commonly known as:  DULCOLAX        Dose:  10 mg   Place 10 mg rectally 2 times daily as needed for constipation   Refills:  0        budesonide 0.5 MG/2ML neb solution   Commonly known as:  PULMICORT   Used for:  Chronic obstructive pulmonary disease, unspecified COPD type (H)        Dose:  0.5 mg   Take 2 mLs (0.5 mg) by nebulization 2 times daily   Quantity:  60 mL   Refills:  3        buPROPion 150 MG 24 hr tablet   Commonly known as:  WELLBUTRIN XL   Used for:  Depression, major, recurrent, moderate (H)        TAKE ONE TABLET BY MOUTH EVERY NIGHT AT BEDTIME   Quantity:  90 tablet   Refills:  0        DULoxetine 60 MG EC capsule   Commonly known as:  CYMBALTA   Used for:  Depression, major, recurrent, moderate (H)        TAKE ONE CAPSULE BY MOUTH ONE TIME DAILY   Quantity:  90 capsule   Refills:  0        gabapentin 300 MG capsule   Commonly known as:  NEURONTIN   Used for:  Gastroesophageal reflux disease without esophagitis        TAKE TWO CAPSULES BY MOUTH TWICE DAILY   Quantity:  120 capsule   Refills:  0        hypromellose-dextran 0.3-0.1% opthalmic solution        Dose:  1 drop   Place 1 drop into both eyes every hour as needed   Refills:  0        * ipratropium - albuterol 0.5 mg/2.5 mg/3 mL 0.5-2.5 (3) MG/3ML neb solution   Commonly known as:  DUONEB        Dose:  1 vial   Take 1 vial by nebulization 4 times daily   Refills:  0        * ipratropium - albuterol 0.5 mg/2.5 mg/3 mL 0.5-2.5 (3) MG/3ML neb solution   Commonly known as:   DUONEB        Dose:  1 vial   Take 1 vial by nebulization every 2 hours as needed for shortness of breath / dyspnea or wheezing   Refills:  0        lidocaine 5 % ointment   Commonly known as:  XYLOCAINE   Used for:  Herpes zoster without complication        Apply topically 3 times daily as needed for moderate pain   Quantity:  50 g   Refills:  1        methadone 5 MG tablet   Commonly known as:  DOLOPHINE   Used for:  Pain of finger of left hand        Dose:  2.5 mg   Take 0.5 tablets (2.5 mg) by mouth every 12 hours   Quantity:  10 tablet   Refills:  0        metoprolol 25 MG tablet   Commonly known as:  LOPRESSOR   Used for:  Essential hypertension        TAKE HALF TABLET BY MOUTH TWICE DAILY   Quantity:  90 tablet   Refills:  0        montelukast 10 MG tablet   Commonly known as:  SINGULAIR   Used for:  Allergic rhinitis due to pollen, unspecified rhinitis seasonality        TAKE ONE TABLET BY MOUTH EVERY NIGHT AT BEDTIME   Quantity:  90 tablet   Refills:  0        omeprazole 20 MG CR capsule   Commonly known as:  priLOSEC   Used for:  Gastroesophageal reflux disease without esophagitis        TAKE ONE CAPSULE BY MOUTH ONE TIME DAILY   Quantity:  30 capsule   Refills:  0        order for DME   Used for:  COPD exacerbation (H), Acute on chronic respiratory failure (H), Acute on chronic diastolic congestive heart failure (H)        Equipment being ordered: Oxygen Titration visit [facility] Fax # [phone]   Refills:  0        polyethylene glycol Packet   Commonly known as:  MIRALAX/GLYCOLAX        Dose:  17 g   Take 17 g by mouth daily as needed for constipation   Refills:  0        predniSONE 10 MG tablet   Commonly known as:  DELTASONE        Dose:  10 mg   Take 10 mg by mouth daily   Refills:  0        senna-docusate 8.6-50 MG per tablet   Commonly known as:  SENOKOT-S;PERICOLACE   Used for:  Constipation, unspecified constipation type        Dose:  2 tablet   Take 2 tablets by mouth 2 times daily    Quantity:  100 tablet   Refills:  3        sennosides 8.6 MG tablet   Commonly known as:  SENOKOT        Dose:  2 tablet   Take 2 tablets by mouth 2 times daily   Refills:  0        Simethicone 180 MG Caps        Dose:  1 capsule   Take 1 capsule by mouth 2 times daily   Quantity:  60 capsule   Refills:  0        tamsulosin 0.4 MG capsule   Commonly known as:  FLOMAX   Used for:  Hypertrophy of prostate with urinary retention        Dose:  0.4 mg   Take 1 capsule (0.4 mg) by mouth daily   Quantity:  90 capsule   Refills:  0        torsemide 10 MG tablet   Commonly known as:  DEMADEX   Used for:  Obstructive chronic bronchitis with exacerbation (H)        Dose:  10 mg   Take 1 tablet (10 mg) by mouth daily   Quantity:  30 tablet   Refills:  1        * Notice:  This list has 4 medication(s) that are the same as other medications prescribed for you. Read the directions carefully, and ask your doctor or other care provider to review them with you.      STOP taking     haloperidol 0.5 MG tablet   Commonly known as:  HALDOL           LORazepam 0.5 MG tablet   Commonly known as:  ATIVAN           morphine sulfate HIGH CONCENTRATE 20 mg/mL (HIGH CONC) solution   Commonly known as:  ROXANOL *CONCENTRATED*                   After Care     Activity - Up with nursing assistance           Advance Diet as Tolerated       Follow this diet upon discharge: Regular       Fall precautions           General info for SNF       Length of Stay Estimate: Short Term Care: Estimated # of Days <30  Condition at Discharge: Stable  Level of care:skilled   Rehabilitation Potential: Excellent  Admission H&P remains valid and up-to-date: Yes  Recent Chemotherapy: N/A  Use Nursing Home Standing Orders: Yes       Hip precautions           Mantoux instructions       Give two-step Mantoux (PPD) Per Facility Policy Yes             Procedures     Oxygen - Nasal cannula       3 Lpm by nasal cannula to keep O2 sats 92% or greater.             Referrals   "   Occupational Therapy Adult Consult       Evaluate and treat as clinically indicated.    Reason:  Weakness and right knee pain       Physical Therapy Adult Consult       Evaluate and treat as clinically indicated.    Reason:  Weakness and right knee pain             Follow-Up Appointment Instructions     Follow Up and recommended labs and tests       Follow up with Nursing home physician.  No follow up labs or test are needed.             Your next 10 appointments already scheduled     May 12, 2017  9:45 AM CDT   [facility]    [address]  [address]  [facility] [address]   [phone]            Jun 09, 2017  9:45 AM CDT   [facility] EMI [facility] C[facility]   [facility]    [address]  [address]  [facility] [address]   [phone]              Statement of Approval     Ordered          05/04/17 1519  I have reviewed and agree with all the recommendations and orders detailed in this document.  EFFECTIVE NOW     Approved and electronically signed by:  Clotilde Huerta PA-C                                                 INTERAGENCY TRANSFER FORM - NURSING   5/3/2017                       [facility] OBSERVATION DEPARTMENT: [phone]            Attending Provider: Fred Britt MD     Allergies:  No Known Allergies    Infection:  None   Service:  Observation    Ht:  1.651 m (5' 5\")   Wt:  --   Admission Wt:  --    BMI:  --   BSA:  --            Advance Directives        Does patient have a scanned Advance Directive/ACP document in EPIC?           Yes        Immunizations     Name Date      Influenza (H1N1) 12/15/09     Influenza (IIV3) 08/27/12     Influenza (IIV3) 08/31/11     Influenza (IIV3) 09/22/10     Influenza (IIV3) 08/28/09     Influenza (IIV3) 10/01/08     Influenza (IIV3) 10/15/04     " Influenza (IIV3) 10/08/03     Influenza (IIV3) 10/23/02     Influenza (IIV3) 10/23/01     Influenza Vaccine IM 3yrs+ 4 Valent IIV4 09/27/16     Influenza Vaccine IM 3yrs+ 4 Valent IIV4 09/04/15     Influenza Vaccine IM 3yrs+ 4 Valent IIV4 09/10/14     Influenza Vaccine IM 3yrs+ 4 Valent IIV4 09/18/13     Pneumococcal (PCV 13) 02/02/15     Pneumococcal 23 valent 01/01/02     TD (ADULT, 7+) 01/23/07     TD (ADULT, 7+) 07/08/96     Zoster vaccine, live 01/12/11       ASSESSMENT     Discharge Profile Flowsheet     EXPECTED DISCHARGE     Patient's communication style  spoken language (English or Bilingual) 10/11/16 1507    Expected Discharge Date  05/05/17 (DRG OBS 1.0<home) 05/04/17 0753   FINAL RESOURCES      DISCHARGE NEEDS ASSESSMENT     Resources List  Skilled Nursing Facility 05/04/17 1506    Concerns To Be Addressed  -- 12/01/16 1632   Other Resources  -- 10/14/16 1236    Concerns Comments  No longer doing private pay services due to cost. 10/19/16 1431   Skilled Nursing Facility  [facility] [phone], Fax: [phone] 05/04/17 1506    Equipment Currently Used at Home  walker, rolling;wheelchair 05/04/17 1028   PAS Number  -- 10/12/16 1357    Transportation Available  car;family or friend will provide 04/05/16 1641   Senior Linkage Line Referral Placed  -- 10/12/16 1357    # of Referrals Placed by CTS  Communication hand-offs to next level of Care Providers 07/25/16 1431   Referrals Placed  [facility] 10/19/16 1447    Equipment Used at Home  walker, standard 02/21/16 1039   SKIN      GASTROINTESTINAL (ADULT,PEDIATRIC,OB)     Inspection  Partial (identify areas NOT inspected) 05/04/17 0923    GI WDL  WDL 05/04/17 0923   Skin areas NOT inspected  Buttock, left;Buttock, right;Sacrum;Coccyx 05/04/17 0923    Abdominal Appearance  obese 05/04/17 0153   Skin WDL  ex 05/04/17 0923    All Quadrants Bowel Sounds  audible and active in all quadrants 05/04/17 0153   Additional  "Documentation  Wound (LDA) 05/03/17 1626    Last Bowel Movement  05/03/17 05/03/17 1626   SAFETY      COMMUNICATION ASSESSMENT     Safety WDL  WDL 05/04/17 0923                 Assessment WDL (Within Defined Limits) Definitions           Safety WDL     Effective: 09/28/15    Row Information: <b>WDL Definition:</b> Bed in low position, wheels locked; call light in reach; upper side rails up x 2; ID band on<br> <font color=\"gray\"><i>Item=AS safety wdl>>List=AS safety wdl>>Version=F14</i></font>      Skin WDL     Effective: 09/28/15    Row Information: <b>WDL Definition:</b> Warm; dry; intact; elastic; without discoloration; pressure points without redness<br> <font color=\"gray\"><i>Item=AS skin wdl>>List=AS skin wdl>>Version=F14</i></font>      Vitals     Vital Signs Flowsheet     VITAL SIGNS     Pain Orientation  Right 05/04/17 1406    Temp  98.9  F (37.2  C) 05/04/17 1235   Pain Descriptors  Aching 05/04/17 1406    Temp src  Oral 05/04/17 1235   Pain Intervention(s)  Medication (See eMAR) 05/04/17 1406    Resp  18 05/04/17 1235   Response to Interventions  Decrease in pain 05/04/17 0723    Pulse  73 05/04/17 1235   ANALGESIA SIDE EFFECTS MONITORING      Heart Rate  89 05/04/17 0418   Side Effects Monitoring: Respiratory Quality  R 05/04/17 0723    Pulse/Heart Rate Source  Monitor 05/04/17 0418   Side Effects Monitoring: Respiratory Depth  N 05/04/17 0723    BP  124/48 05/04/17 1235   Side Effects Monitoring: Sedation Level  1 05/04/17 0723    BP Location  Left arm 05/04/17 1235   DAILY CARE      OXYGEN THERAPY     Activity Type  up to commode;up in chair 05/04/17 1256    SpO2  95 % 05/04/17 1235   Activity Level of Assistance  assistance, 1 person 05/04/17 1256    O2 Device  Nasal cannula 05/04/17 1235   Activity Assistive Device  walker 05/04/17 1256    Oxygen Delivery  3 LPM 05/04/17 1235   EKG MONITORING      PAIN/COMFORT     Cardiac Regularity  Regular 05/03/17 1355    Patient Currently in Pain  yes 05/04/17 1406 "   Cardiac Rhythm  NSR 05/03/17 1355    Preferred Pain Scale  number (Numeric Rating Pain Scale) 05/04/17 1406   POSITIONING      Patient's Stated Pain Goal  No pain 05/04/17 1406   Body Position  supine, head elevated 05/04/17 1410    0-10 Pain Scale  8 05/04/17 1406   Head of Bed (HOB)  HOB at 20-30 degrees 05/04/17 0738    Pain Location  Knee 05/04/17 1406   Chair  Upright in chair 05/04/17 1235            Patient Lines/Drains/Airways Status    Active LINES/DRAINS/AIRWAYS     Name: Placement date: Placement time: Site: Days: Last dressing change:    Urethral Catheter Coude 16 fr 02/18/16   2220   Coude   440     Peripheral IV 05/03/17 Right 05/03/17   0915      1     Wound 05/03/17 Right Toe (Comment  which one) Laceration  05/03/17   1619   Toe (Comment  which one)   less than 1             Patient Lines/Drains/Airways Status    Active PICC/CVC     None            Intake/Output Detail Report     Date Intake   Output Net    Shift I.V. IV Piggyback Total Urine Total       Day 05/03/17 0700 - 05/03/17 1459 -- -- -- -- -- 0    Willa 05/03/17 1500 - 05/03/17 2259 -- -- -- 200 200 -200    Noc 05/03/17 2300 - 05/04/17 0659 -- -- -- -- -- 0    Day 05/04/17 0700 - 05/04/17 1459 -- -- -- -- -- 0    Willa 05/04/17 1500 - 05/04/17 2259 -- -- -- -- -- 0      Last Void/BM       Most Recent Value    Urine Occurrence 2 at 05/04/2017 1256    Stool Occurrence       Case Management/Discharge Planning     Case Management/Discharge Planning Flowsheet     REFERRAL INFORMATION     Outpatient/Agency/Support Group Needs  homecare agency (specify level of care) 04/10/13 0950    Arrived From  home 02/22/16 1536   Community Agency Name(S)  [facility] 04/10/13 0950    # of Referrals Placed by CTS  Communication hand-offs to next level of Care Providers 07/25/16 1431   Equipment Used at Home  walker, standard 02/21/16 1039    Primary Care Clinic Name  [facility] 04/08/16 1534   FINAL RESOURCES      Primary Care MD Name  Negrito  04/08/16 1534   Equipment Currently Used at Home  walker, rolling;wheelchair 05/04/17 1028    LIVING ENVIRONMENT     Resources List  Skilled Nursing Facility 05/04/17 1506    Lives With  spouse 05/04/17 1028   Other Resources  -- 10/14/16 1236    Living Arrangements  house 05/04/17 1028   Skilled Nursing Facility  [facility] [phone], Fax: [phone] 05/04/17 1506    Provides Primary Care For  spouse 04/02/16 0224   PAS Number  -- 10/12/16 1357    COPING/STRESS     Senior Linkage Line Referral Placed  -- 10/12/16 1357    Major Change/Loss/Stressor  none 04/02/16 0220   Referrals Placed  Complex [facility] Clinic 10/19/16 1447    EXPECTED DISCHARGE     ABUSE RISK SCREEN      Expected Discharge Date  05/05/17 (DRG OBS 1.0<home) 05/04/17 0753   QUESTION TO PATIENT:  Has a member of your family or a partner(now or in the past) intimidated, hurt, manipulated, or controlled you in any way?  no 05/03/17 0856    ASSESSMENT/CONCERNS TO BE ADDRESSED     (R) MENTAL HEALTH SUICIDE RISK      Concerns To Be Addressed  -- 12/01/16 1632   Are you depressed or being treated for depression?  No 05/03/17 1544    Concerns Comments  No longer doing private pay services due to cost. 10/19/16 1431   HOMICIDE RISK      DISCHARGE PLANNING     Homicidal Ideation  no 05/03/17 0856    Transportation Available  car;family or friend will provide 04/05/16 1641                       [facility] OBSERVATION DEPARTMENT: [phone]            Medication Administration Report for Karan Vaz as of 05/04/17 1606   Legend:    Given Hold Not Given Due Canceled Entry Other Actions    Time Time (Time) Time  Time-Action       Inactive    Active    Linked        Medications 04/28/17 04/29/17 04/30/17 05/01/17 05/02/17 05/03/17 05/04/17    acetaminophen (TYLENOL) tablet 650 mg  Dose: 650 mg Freq: EVERY 4 HOURS PRN Route: PO  PRN Reason: mild pain  Start: 05/03/17 1530   Admin Instructions: Alternate ibuprofen (if  ordered) with acetaminophen.  Maximum acetaminophen dose from all sources = 75 mg/kg/day not to exceed 4 grams/day.          1600 (650 mg)-Given        0645 (650 mg)-Given       1407 (650 mg)-Given           albuterol neb solution 2.5 mg  Dose: 2.5 mg Freq: EVERY 2 HOURS PRN Route: NEBULIZATION  PRN Reason: wheezing  Start: 05/03/17 1530              bisacodyl (DULCOLAX) Suppository 10 mg  Dose: 10 mg Freq: 2 TIMES DAILY PRN Route: RE  PRN Reason: constipation  Start: 05/03/17 1530              budesonide (PULMICORT) neb solution 0.5 mg  Dose: 0.5 mg Freq: 2 TIMES DAILY Route: NEBULIZATION  Start: 05/03/17 2000         2156 (0.5 mg)-Given        0823 (0.5 mg)-Given       [ ] 2000           buPROPion (WELLBUTRIN XL) 24 hr tablet 150 mg  Dose: 150 mg Freq: DAILY Route: PO  Start: 05/03/17 1531   Admin Instructions: DO NOT CRUSH.          1720 (150 mg)-Given        0900 (150 mg)-Given           DULoxetine (CYMBALTA) EC capsule 60 mg  Dose: 60 mg Freq: DAILY Route: PO  Start: 05/03/17 1531         1721 (60 mg)-Given        0900 (60 mg)-Given           gabapentin (NEURONTIN) capsule 600 mg  Dose: 600 mg Freq: 2 TIMES DAILY Route: PO  Start: 05/03/17 2000         2023 (600 mg)-Given        0859 (600 mg)-Given       [ ] 2000           haloperidol (HALDOL) tablet 0.5-1 mg  Dose: 0.5-1 mg Freq: EVERY 6 HOURS PRN Route: PO  PRN Reason: agitation  Start: 05/03/17 1530              hydrOXYzine (ATARAX) tablet 25 mg  Dose: 25 mg Freq: EVERY 6 HOURS PRN Route: PO  PRN Reason: other  PRN Comment: adjuvant pain  Start: 05/03/17 1530 2023 (25 mg)-Given            ipratropium - albuterol 0.5 mg/2.5 mg/3 mL (DUONEB) neb solution 3 mL  Dose: 1 vial Freq: 4 TIMES DAILY Route: NEBULIZATION  Start: 05/03/17 1600         1625 (3 mL)-Given       2149 (3 mL)-Given        0823 (3 mL)-Given       1142 (3 mL)-Given       [ ] 1600       [ ] 2000           LORazepam (ATIVAN) half-tab 0.25-0.5 mg  Dose: 0.25-0.5 mg Freq: EVERY 4 HOURS PRN  Route: PO  PRN Reason: anxiety  Start: 05/03/17 1530              methadone (DOLOPHINE) half-tab 2.5 mg  Dose: 2.5 mg Freq: EVERY 12 HOURS Route: PO  Start: 05/03/17 2000 2022 (2.5 mg)-Given        0900 (2.5 mg)-Given       [ ] 2000           metoprolol (LOPRESSOR) half-tab 12.5 mg  Dose: 12.5 mg Freq: 2 TIMES DAILY Route: PO  Start: 05/03/17 2000 2023 (12.5 mg)-Given        0900 (12.5 mg)-Given       [ ] 2000           montelukast (SINGULAIR) tablet 10 mg  Dose: 10 mg Freq: AT BEDTIME Route: PO  Start: 05/03/17 2100 2024 (10 mg)-Given        [ ] 2100           naloxone (NARCAN) injection 0.1-0.4 mg  Dose: 0.1-0.4 mg Freq: EVERY 2 MIN PRN Route: IV  PRN Reason: opioid reversal  Start: 05/03/17 1530   Admin Instructions: For respiratory rate LESS than or EQUAL to 8.  Partial reversal dose:  0.1 mg titrated q 2 minutes for Analgesia Side Effects Monitoring Sedation Level of 3 (frequently drowsy, arousable, drifts to sleep during conversation).Full reversal dose:  0.4 mg bolus for Analgesia Side Effects Monitoring Sedation Level of 4 (somnolent, minimal or no response to stimulation).               omeprazole (priLOSEC) CR capsule 20 mg  Dose: 20 mg Freq: DAILY Route: PO  Start: 05/03/17 1531         1722 (20 mg)-Given        0859 (20 mg)-Given           ondansetron (ZOFRAN-ODT) ODT tab 4 mg  Dose: 4 mg Freq: EVERY 6 HOURS PRN Route: PO  PRN Reason: nausea  Start: 05/03/17 1530   Admin Instructions: This is Step 1 of nausea and vomiting management.  If nausea not resolved in 15 minutes, go to Step 2 prochlorperazine (COMPAZINE). Do not push through foil backing. Peel back foil and gently remove. Place on tongue immediately. Administration with liquid unnecessary              Or  ondansetron (ZOFRAN) injection 4 mg  Dose: 4 mg Freq: EVERY 6 HOURS PRN Route: IV  PRN Reasons: nausea,vomiting  Start: 05/03/17 1530   Admin Instructions: This is Step 1 of nausea and vomiting management.  If nausea not  resolved in 15 minutes, go to Step 2 prochlorperazine (COMPAZINE).  Irritant.               oxyCODONE (ROXICODONE) IR half-tab 2.5-5 mg  Dose: 2.5-5 mg Freq: EVERY 3 HOURS PRN Route: PO  PRN Reason: moderate to severe pain  Start: 05/03/17 1530   Admin Instructions: If age greater than 65 use lower dose for first time use.          1723 (5 mg)-Given       2023 (5 mg)-Given        0644 (5 mg)-Given           polyethylene glycol (MIRALAX/GLYCOLAX) Packet 17 g  Dose: 17 g Freq: DAILY PRN Route: PO  PRN Reason: constipation  Start: 05/03/17 1530   Admin Instructions: Give in 8oz of  water, juice, or soda. Hold for loose stools.  This is the second step of a three step constipation treatment protocol.  1 Packet = 17 grams. Mixed prescribed dose in 8 ounces of water. Follow with 8 oz. of water.               predniSONE (DELTASONE) tablet 10 mg  Dose: 10 mg Freq: DAILY Route: PO  Start: 05/03/17 1531 1722 (10 mg)-Given        0900 (10 mg)-Given           senna-docusate (SENOKOT-S;PERICOLACE) 8.6-50 MG per tablet 1-2 tablet  Dose: 1-2 tablet Freq: 2 TIMES DAILY PRN Route: PO  PRN Comment: constipation   Start: 05/03/17 1530   Admin Instructions: If no bowel movement in 24 hours, increase to 2 tablets PO BID.  Hold for loose stools.   This is the first step of a three step constipation treatment protocol.           0900 (1 tablet)-Given           senna-docusate (SENOKOT-S;PERICOLACE) 8.6-50 MG per tablet 2 tablet  Dose: 2 tablet Freq: 2 TIMES DAILY Route: PO  Start: 05/03/17 2000 2022 (1 tablet)-Given               [ ] 2000           simethicone (MYLICON) chewable tablet 160 mg  Dose: 160 mg Freq: 2 TIMES DAILY Route: PO  Start: 05/03/17 2000 2023 (160 mg)-Given        0900 (160 mg)-Given       [ ] 2000           tamsulosin (FLOMAX) capsule 0.4 mg  Dose: 0.4 mg Freq: DAILY Route: PO  Start: 05/03/17 1531   Admin Instructions: Administer 30 minutes after the same meal each day.  Capsules should be  "swallowed whole; do not crush chew or open.          1723 (0.4 mg)-Given        0900 (0.4 mg)-Given           torsemide (DEMADEX) tablet 10 mg  Dose: 10 mg Freq: DAILY Route: PO  Start: 05/04/17 0800          0900 (10 mg)-Given           traMADol (ULTRAM) half-tab 25-50 mg  Dose: 25-50 mg Freq: 3 TIMES DAILY PRN Route: PO  PRN Reason: moderate pain  Start: 05/03/17 1530             Completed Medications  Medications 04/28/17 04/29/17 04/30/17 05/01/17 05/02/17 05/03/17 05/04/17         Dose: 4 mg Freq: ONCE PRN Route: IV  PRN Reason: moderate to severe pain  Start: 05/03/17 0901   End: 05/03/17 0926         0926 (4 mg)-Given           Discontinued Medications  Medications 04/28/17 04/29/17 04/30/17 05/01/17 05/02/17 05/03/17 05/04/17         Rate: 100 mL/hr Freq: CONTINUOUS Route: IV  Last Dose: Stopped (05/03/17 1404)  Start: 05/03/17 0903   End: 05/03/17 1530         0930 ( )-New Bag       1404-ED Infusing on Admission/transfer       1530-Med Discontinued  1554-Stopped              Freq: EVERY 1 HOUR PRN Route: Top  PRN Reason: pain  PRN Comment: with VAD insertion or accessing implanted port.  Start: 05/03/17 0857   End: 05/03/17 1530   Admin Instructions: Do NOT give if patient has a history of allergy to any local anesthetic or any \"bernie\" product.   Apply 30 minutes prior to VAD insertion or port access.  MAX Dose:  2.5 g (  of 5 g tube)          1530-Med Discontinued          Start: 05/03/17 1222   End: 05/03/17 1511   Admin Instructions: Sal Eugene : shahlainet override                 1511-Med Discontinued          Dose: 1 mL Freq: EVERY 1 HOUR PRN Route: OTHER  PRN Comment: mild pain with VAD insertion or accessing implanted port  Start: 05/03/17 0857   End: 05/03/17 1530   Admin Instructions: Do NOT give if patient has a history of allergy to any local anesthetic or any \"bernie\" product. MAX dose 1 mL subcutaneous OR intradermal in divided doses.          1530-Med Discontinued          Dose: 4 mg " Freq: EVERY 30 MIN PRN Route: IV  PRN Reasons: nausea,vomiting  Start: 05/03/17 0901   End: 05/03/17 1530   Admin Instructions: May repeat in 30 minutes as needed, up to 3 doses.  Irritant.          0926 (4 mg)-Given       1530-Med Discontinued          Dose: 17 g Freq: DAILY PRN Route: PO  PRN Reason: constipation  Start: 05/03/17 1530   End: 05/03/17 1557   Admin Instructions: 1 Packet = 17 grams. Mixed prescribed dose in 8 ounces of water. Follow with 8 oz. of water.          1557-Med Discontinued          Dose: 3 mL Freq: EVERY 8 HOURS Route: IK  Start: 05/03/17 0903   End: 05/03/17 1530   Admin Instructions: And Q1H PRN, to lock peripheral IV dormant line.                 1530-Med Discontinued          Dose: 3 mL Freq: EVERY 1 HOUR PRN Route: IK  PRN Reason: line flush  PRN Comment: for peripheral IV flush post IV meds  Start: 05/03/17 0857   End: 05/03/17 1530         1530-Med Discontinued     Medications 04/28/17 04/29/17 04/30/17 05/01/17 05/02/17 05/03/17 05/04/17               INTERAGENCY TRANSFER FORM - NOTES (H&P, Discharge Summary, Consults, Procedures, Therapies)   5/3/2017                       [facility] OBSERVATION DEPARTMENT: [phone]               History & Physicals      H&P by Clotilde Huerta PA-C at 5/3/2017  2:54 PM     Author:  Clotilde Huerta PA-C Service:  Hospitalist Author Type:  Physician Assistant - C    Filed:  5/3/2017  3:06 PM Date of Service:  5/3/2017  2:54 PM Note Created:  5/3/2017  2:54 PM    Status:  Attested :  Clotilde Huerta PA-C (Physician Assistant - C)    Cosigner:  Fred Britt MD at 5/4/2017 12:58 PM        Attestation signed by Fred Britt MD at 5/4/2017 12:58 PM        Physician Attestation   I, Fred Britt, have reviewed and discussed with the advanced practice provider their history, physical and plan for Karan Vaz. I did not participate in a shared visit by interviewing or examining the patient  and this should be billed as an advanced practice provider only visit.    Fred Britt  Date of Service (when I saw the patient): I did not personally see this patient today.                               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 Unfortunately, he is unable to ambulate in the ED and they have requested a 'non hospice related' admission for inability to walk and placement.    ED sign out by Dr. Eugene and chart review performed: Vitals show temp of 98, pulse 76, and pressure 158/70 with spontaneous respirations and no hypoxia. Labs remarkable for Creatinine 0.85, normal electrolytes, WBC 8.7, Hgb 11.8, troponin negative, INR 0.88. CT head and CT cervical spine negative. X ray pelvis negative. X ray left hand shows a minimally displaced avulsion fracture at the left second proximal interphalangeal joint. X rays right femur negative.       1. Right knee pain after mechanical fall  Patient is unable to bear weight because of right knee pain after a mechanical fall today. Imaging was done of his right foot, femur and pelvis but pain is specifically in his knee.  -X ray of right knee added  -Pain control with tylenol, low dose oxycodone and atarax. Hx of GI bleed so can not take ibuprofen.  -Instructed to nursing to elevate, compress and ice knee  -PT assessment  -Social work to help with disposition planning    2. Fall with left 2nd finger fracture  Mechanical fall with fracture of left 2nd finger.   -Pain control as needed.    3. Hospice patient  Continue all home meds. No vital sign checks or labs ordered.    Social: Hospice patient and  is Gary Michele  [phone], or [facility] at [phone]  Code: DNR/DNI  VTE prophylaxis: None given hospice  Disposition: Observation                    Chief Complaint:   Weakness and inability to walk         History of Present Illness:   Karan Vaz is a 84 year old male who presents with a mechanical fall while leaving the bathroom today. He fell onto his right side landing on his knee. His wife heard him fall and called 911. Now when he tries to bear weight he has pain in the right knee. He also broke his finger on his left hand but this isn't bothering him very much. He did not lose consciousness when he fell or have complaints of lightheadedness or dizziness. He denies shortness of breath, cough, diarrhea, vomiting and urinary symptoms. He is a hospice patient and would prefer to continue hospice cares while here. He is also hoping to go home.     I spoke with Gary fuentes RN hospice case manager who states that this is 'non hospice related' admission and therefore we should admit him like any other patient.              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     Gastro-oesophageal reflux disease      Moderate persistent asthma      Myoclonus     restless leg, nocturnal restless     Restless legs syndrome (RLS) 3/8/2010     Sensory neuropathy (H) 6/8/2011               Past Surgical History:     Past Surgical History:   Procedure Laterality Date     ARTHROPLASTY HIP BILATERAL       ARTHROSCOPY SHOULDER DISTAL CLAVICLE REPAIR  5/30/2012    Procedure:ARTHROSCOPY SHOULDER DISTAL CLAVICLE RESECTION; Left shoulder arthroscopy, Bicep tenontomy, debridement of partial thickness cuff tear, Subacromial Decompression.; Surgeon:MONIE MENDIETA; Location: OR     C SHOULDER SURG PROC UNLISTED      Shoulder     C SHOULDER SURG  PROC UNLISTED      Shoulder     C SHOULDER SURG PROC UNLISTED      Shoulder     HC REVISE MEDIAN N/CARPAL TUNNEL SURG  7/01/02    simonet, left     HC REVISE MEDIAN N/CARPAL TUNNEL SURG  09/24/02    Dr Montiel left     PROST. MICROWAVE THERMOTX  2012    dr Sin      SIGMOIDOSCOPY FLEXIBLE N/A 2/16/2016    Procedure: SIGMOIDOSCOPY FLEXIBLE;  Surgeon: Ky Small MD;  Location:  GI               Social History:     Social History     Social History     Marital status:      Spouse name: Britt     Number of children: N/A     Years of education: 12     Occupational History      Retired          Social History Main Topics     Smoking status: Former Smoker     Quit date: 1/1/1990     Smokeless tobacco: Never Used      Comment: 20 years ago     Alcohol use No     Drug use: No     Sexual activity: Yes     Partners: Female     Other Topics Concern     Not on file     Social History Narrative               Family History:     Family History   Problem Relation Age of Onset     Colon Cancer No family hx of               Allergies:      Allergies   Allergen Reactions     No Known Allergies                Medications:     Prior to Admission medications    Medication Sig Last Dose Taking? Auth Provider   MAPAP 500 MG tablet TAKE 1 TABLET BY MOUTH EVERY 4 HOURS AS NEEDED AND 2 TABLETS AT BEDTIME. GENERIC FOR TYLENOL (ACETAMINOPHEN)   Nidia Mitchell APRN CNP   DULoxetine (CYMBALTA) 60 MG EC capsule TAKE ONE CAPSULE BY MOUTH ONE TIME DAILY    Nidia Mitchell APRN CNP   gabapentin (NEURONTIN) 300 MG capsule TAKE TWO CAPSULES BY MOUTH TWICE DAILY    Nidia Mitchell APRN CNP   gabapentin (NEURONTIN) 300 MG capsule TAKE TWO CAPSULES BY MOUTH TWICE DAILY    Nidia Mitchell APRN CNP   tamsulosin (FLOMAX) 0.4 MG capsule Take 1 capsule (0.4 mg) by mouth daily   Nidia Mitchell APRN CNP   traMADol (ULTRAM) 50 MG tablet Take 1 tablet (50 mg) by mouth 3 times  daily as needed for moderate pain      senna-docusate (SENOKOT-S;PERICOLACE) 8.6-50 MG per tablet Take 2 tablets by mouth 2 times daily      metoprolol (LOPRESSOR) 25 MG tablet TAKE HALF TABLET BY MOUTH TWICE DAILY    Nidia Mitchell APRN CNP   albuterol (2.5 MG/3ML) 0.083% neb solution INHALE 1 VIAL BY NEBULIZATION EVERY 2 HOURS AS NEEDED FOR DYSPNEA   Nidia Mitchell APRN CNP   tiotropium (SPIRIVA HANDIHALER) 18 MCG capsule INHALE 1 CAPSULE (18 MCG) BY INHALATION ROUTE ONCE DAILY   Nidia Mitchell APRN CNP   formoterol (PERFOROMIST) 20 MCG/2ML neb solution Take 2 mLs (20 mcg) by nebulization every 12 hours   Nidia Mitchell APRN CNP   acetaminophen (MAPAP) 500 MG tablet Take 2 tablets (1,000 mg) by mouth 2 times daily   Sindi Wilson APRN CNP   lidocaine (XYLOCAINE) 5 % ointment Apply topically 3 times daily as needed for moderate pain   Sindi Wilson APRN CNP   omeprazole (PRILOSEC) 20 MG CR capsule TAKE ONE CAPSULE BY MOUTH ONE TIME DAILY    Nidia Mitchell APRN CNP   traZODone (DESYREL) 50 MG tablet Take 50mg at bedtime.   Nidia Mitchell APRN CNP   torsemide (DEMADEX) 10 MG tablet Take 1 tablet (10 mg) by mouth daily   Nidia Mitchell APRN CNP   budesonide (PULMICORT) 0.5 MG/2ML neb solution Take 2 mLs (0.5 mg) by nebulization 2 times daily   Barron Handley MD   buPROPion (WELLBUTRIN XL) 150 MG 24 hr tablet TAKE ONE TABLET BY MOUTH EVERY NIGHT AT BEDTIME   Barron Handley MD   montelukast (SINGULAIR) 10 MG tablet TAKE ONE TABLET BY MOUTH EVERY NIGHT AT BEDTIME   Barron Handley MD   albuterol (PROAIR HFA, PROVENTIL HFA, VENTOLIN HFA) 108 (90 BASE) MCG/ACT inhaler Inhale 2 puffs into the lungs every 6 hours as needed for shortness of breath / dyspnea   Nidia Mitchell APRN CNP   Simethicone 180 MG CAPS Take 1 capsule by mouth 2 times daily      order for DME  Equipment being ordered: Oxygen Titration visit [facility]  Fax # [phone]   Teagan Correa MD              Review of Systems:   A Comprehensive greater than 10 system review of systems was carried out.  Pertinent positives and negatives are noted above.  Otherwise negative for contributory information.            Physical Exam:   Blood pressure 118/62, pulse 76, temperature 98  F (36.7  C), temperature source Oral, resp. rate 24, SpO2 94 %.  Exam:  GENERAL:  Comfortable.  PSYCH: pleasant, oriented, No acute distress.  HEENT:  PERRLA. Normal conjunctiva, normal hearing, nasal mucosa and Oropharynx are normal.  NECK:  Supple, no neck vein distention, adenopathy or bruits, normal thyroid.  HEART:  Normal S1, S2 with no murmur, no pericardial rub, gallops or S3 or S4.  LUNGS:  Clear to auscultation, normal Respiratory effort. No wheezing, rales or ronchi.  ABDOMEN:  Soft, no hepatosplenomegaly, normal bowel sounds. Non-tender, non distended.   EXTREMITIES:  No pedal edema, +2 pulses bilateral and equal. Right knee is slightly more swollen then left. No redness. Tender to flexion and palpation.  SKIN:  Dry to touch, No rash, wound or ulcerations.  NEUROLOGIC:  CN 2-12 grossly intact,  sensation is intact with no focal deficits.               Data:[NJ1.1]       Recent Labs  Lab 05/03/17  0915   WBC 8.7   HGB 11.8*   HCT 37.9*   MCV 94          Recent Labs  Lab 05/03/17  0915      POTASSIUM 4.2   CHLORIDE 97   CO2 34*   ANIONGAP 7   *   BUN 25   CR 0.85   GFRESTIMATED 85   GFRESTBLACK >90African American GFR Calc   GITA 9.3   PROTTOTAL 7.4   ALBUMIN 3.8   BILITOTAL 0.3   ALKPHOS 88   AST 27   ALT 23         Recent Results (from the past 24 hour(s))   CT Head w/o Contrast    Narrative    CT HEAD WITHOUT CONTRAST  5/3/2017 10:19 AM    HISTORY: Headache after fall.    TECHNIQUE: Scans were obtained through the head without IV contrast.  Radiation dose for this scan was reduced using automated  exposure  control, adjustment of the mA and/or kV according to patient size, or  iterative reconstruction technique.    COMPARISON: 10/11/2016.    FINDINGS: Moderate atrophy. Low-density change in the white matter of  both hemispheres consistent with chronic small vessel ischemic  disease. No hemorrhage, mass lesion, or focal area of acute infarction  identified. Paranasal sinuses are normal. No bony abnormality. No  interval change.      Impression    IMPRESSION:   1. Atrophy and chronic white matter disease.  2. Nothing acute and no interval change.    KARISHMA COKER MD   CT Cervical Spine w/o Contrast    Narrative    CT CERVICAL SPINE WITHOUT CONTRAST  5/3/2017 10:22 AM    HISTORY:  Neck pain after trauma.    COMPARISON: None.    TECHNIQUE: Routine CT cervical spine to T1. Multiplanar  reconstruction. Radiation dose for this scan was reduced using  automated exposure control, adjustment of the mA and/or kV according  to patient size, or iterative reconstruction technique.    FINDINGS: Slight anterior subluxation of C4 on C5 due to facet joint  disease. Otherwise normal alignment through T1. No fracture. No  acute-appearing abnormality. Degenerative changes as follows:    C2-C3: Advanced bilateral facet joint disease.    C3-C4: Moderate ventral ridging. Advanced facet joint disease, left  more than right. Moderate left-sided foraminal stenosis.    C4-C5: Anterior subluxation of C4 on C5 due to advanced facet joint  disease which is worse on the left. Moderate left-sided foraminal  stenosis.    C5-C6: Moderate narrowing of the interspace. Mild ventral ridging.  Moderate foraminal stenosis on the right.    C6-C7: Mild ventral ridge. No significant central or lateral stenosis.    C7-T1: Negative.      Impression    IMPRESSION:  1. Multilevel degenerative changes described.  2. No fracture or acute abnormality.    KARISHMA COKER MD   XR Hand Left G/E 3 Views    Narrative    XR HAND LT G/E 3 VW 5/3/2017 10:45  AM    COMPARISON: None.    HISTORY: Pain after fall, concern for fracture.      Impression    IMPRESSION: Possible minimally displaced avulsion type fracture at the  left second proximal interphalangeal joint. Osteopenia is noted about  the left hand. No other findings concerning for fracture.    LIANG WARD   Foot  XR, G/E 3 views, right    Narrative    XR FOOT RT G/E 3 VW 5/3/2017 10:46 AM    COMPARISON: None.    HISTORY: Laceration at the base of the fifth toe, concern for foreign  body or fracture.      Impression    IMPRESSION: No displaced fractures are seen in the right foot. No  radiodense foreign bodies.    LIANG WARD   Pelvis XR, 1-2 views    Narrative    XR FEMUR RT 2 VW, XR PELVIS 1/2 VW 5/3/2017 10:48 AM    COMPARISON: None.    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  Hardware appears intact. Left arthroplasty is not completely  visualized. No fractures are seen.    LIANG WARD   Femur XR, 2 views, right    Narrative    XR FEMUR RT 2 VW, XR PELVIS 1/2 VW 5/3/2017 10:48 AM    COMPARISON: None.    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  Hardware appears intact. Left arthroplasty is not completely  visualized. No fractures are seen.    LIANG WARD[NJ1.2]         Clotilde Huerta PA-C[NJ1.1]           Revision History        User Key Date/Time User Provider Type Action    > NJ1.2 5/3/2017  3:06 PM Clotilde Huerta PA-C Physician Assistant Venkatesh SAM Sign     NJ1.1 5/3/2017  2:54 PM Clotilde Huerta PA-C Physician Assistant - C                   Discharge Summaries     No notes of this type exist for this encounter.         Consult Notes      Consults by Angi Francis APRN CNS at 5/4/2017  2:55 PM     Author:  Angi Francis APRN CNS Service:  Pain Service Author Type:  Clinical Nurse Specialist    Filed:  5/4/2017  3:04 PM Date of  Service:  5/4/2017  2:55 PM Note Created:  5/4/2017  2:55 PM    Status:  Signed :  Angi Francis APRN CNS (Clinical Nurse Specialist)         [facility]    Palliative Care Consultation without direct patient contact    This patient's H+P, most recent hospitalist note, medication profile and labs from the past 24 hours have been reviewed at the request of Clotilde Huerta PA-C  for Assist with methadone therapy.   Patient is a hospice patient who is admitted to the hospital after a fall for a non-hospice admission.  Plan to DC today to TCU and return to hospice care once therapy completed. He is on methadone for symptom management in hospice care.  Current dose is consistent with dose as outpatient and requested to continue without change in therapy for TCU until he returns to hospice.    [place]one 5mg tabs #30/30 days on 5/2/2017 per Angi Monterroso NP, [facility].      Recommendation: Continuation of therapy appears appropriate for this patient's symptom management within noted goals.  Will write prescription for facilitation of transfer to TCU  And eventual return to hospice care.  It has been determined that no change is necessary to the current plan of care at this time.   The chart will be reviewed regularly and the patient will be seen if necessary.   If you would like the patient to be seen, please contact the service at [phone] and ask to have the patient seen.  Time Spent 20 minutes, none in direct contact with patient or family.    Thank you!    Angi Francis   Pain Management and Palliative Care  [facility]  Pgr: [phone][AK1.1]         Revision History        User Key Date/Time User Provider Type Action    > AK1.1 5/4/2017  3:04 PM Angi Francis APRN CNS Clinical Nurse Specialist Sign                     Progress Notes - Physician (Notes for yesterday and today)      ED Provider Notes by Sal Eugene  MD at 5/3/2017  8:40 AM     Author:  Sal Eugene MD Service:  Emergency Medicine Author Type:  Physician    Filed:  5/3/2017  2:28 PM Date of Service:  5/3/2017  8:40 AM Note Created:  5/3/2017  8:43 AM    Status:  Signed :  Sal Eugene MD (Physician)           History     Chief Complaint:  Leg Pain    HPI   History is limited due to patient being hard of hearing:    Karan Vaz is a 84 year old male with a history of COPD and neuropathy who presents with fall. The patient reports that he fell while getting off the commode this morning, hitting his right hip, back of his head, and cutting his right foot. EMS was called, and he was brought to the ED. While in the ED, he reports that he felt fine prior to the fall and did not feel lightheaded prior. He currently reports nausea, right hip pain, and headache. He denies recent illness or diarrhea.       Allergies:  NKDA     Medications:    Mapap  Cymbalta  Neurontin  Flomax  Ultram  Senokot  Lopressor  Albuterol nebulizer  Spiriva HandiHaler  Formoterol nebulizer  Prilosec  Desyrel   Demadex  Pulmicort  Wellbutrin  Singulair   Proair  Simethicone     Past Medical History:    Bladder neck obstruction  COPD  Chronic insomnia  Elastofibroma thoracic wall, benign  GERD  Moderate persistent asthma  Myoclonus  RLS  Sensory neuropathy   Hypertrophy of prostate with urinary retention    Past Surgical History:    Arthroplasty hip bilateral  Arthroscopy shoulder distal clavicle repair  Revise median carpal tunnel surgery x2  Sigmoidoscopy flexible     Family History:  History reviewed.  No significant family history.     Social History:  Relationship status:   The patient formerly smoked (Quit date 1/1/1990).   The patient does not drink alcohol.   The patient lives at home with his wife.        Review of Systems   Gastrointestinal: Positive for nausea. Negative for diarrhea and vomiting.   Musculoskeletal:        Positive for right hip  pain.  Positive for thumb pain.   Skin: Positive for wound (Right foot laceration).   Neurological: Positive for headaches. Negative for dizziness and light-headedness.   All other systems reviewed and are negative.      Physical Exam[EF1.1]     Patient Vitals for the past 24 hrs:   BP Temp Temp src Pulse Heart Rate Resp SpO2   05/03/17 1138 - - - - 75 - 97 %   05/03/17 1135 - - - - 76 - 96 %   05/03/17 1130 - - - - 76 - 97 %   05/03/17 1125 135/78 - - - 75 - 96 %   05/03/17 1124 - - - - 77 - 96 %   05/03/17 1120 - - - - 76 - 96 %   05/03/17 1110 149/81 - - - 76 - 97 %   05/03/17 1105 145/82 - - - - - -   05/03/17 1056 - - - - - - 96 %   05/03/17 0945 - - - - - - 96 %   05/03/17 0940 113/61 - - - - - 97 %   05/03/17 0935 - - - - - - 97 %   05/03/17 0925 137/66 - - - - - -   05/03/17 0910 141/63 - - - - - -   05/03/17 0905 - - - - - - 100 %   05/03/17 0854 158/70 98  F (36.7  C) Oral 76 - 24 100 %[EF1.2]            Physical Exam  General: The patient is alert, in no respiratory distress.    HENT: Mucous membranes moist.    Cardiovascular: Regular rate and rhythm. Good pulses in all four extremities. Normal capillary refill and skin turgor.     Respiratory: Lungs are clear. No nasal flaring. No retractions. No wheezing, no crackles.    Gastrointestinal: Abdomen soft. No guarding, no rebound. No palpable hernias. Large abdomen and discomfort with palpation.    Musculoskeletal: No gross deformity.  Left thumb is tender. The right hip is tender. Tenderness over his occiput.    Skin: No rashes or petechiae.[EF1.1] 2.5 cm[EF1.2] laceration over the dorsal base of the right 5th toe    Neurologic: hard of hearing, can answer limited loud questions and follows basic commands.    Lymphology: No cervical adenopathy. No lower extremity swelling.    Psychiatric: The patient is non-tearful.    Emergency Department Course[EF1.1]     ECG @ 0916  Indication: Fall  Rate 73 bpm.   GA interval 134 ms.   QRS duration 78 ms.   QT/QTc  378/416 ms.   P-R-T axes -48.  Notes: Sinus rhythm with premature supraventricular complexes   Time read 0919[EF1.3]    Imaging:  Radiographic findings were communicated with the patient who voiced understanding of the findings.[EF1.1]    Right Femur XR per radiology:   IMPRESSION: Postoperative changes of bilateral total hip arthroplasty.  Hardware appears intact. Left arthroplasty is not completely  visualized. No fractures are seen.    Pelvis XR per radiology:   IMPRESSION: Postoperative changes of bilateral total hip arthroplasty.  Hardware appears intact. Left arthroplasty is not completely  visualized. No fractures are seen.    Right Foot XR per radiology:   IMPRESSION: No displaced fractures are seen in the right foot. No  radiodense foreign bodies.    Left Hand XR per radiology:   IMPRESSION: Possible minimally displaced avulsion type fracture at the  left second proximal interphalangeal joint. Osteopenia is noted about  the left hand. No other findings concerning for fracture.    Cervical spine CT, without contrast, per radiology:   IMPRESSION:  1. Multilevel degenerative changes described.  2. No fracture or acute abnormality.    Head CT, without contrast, as per radiology:   IMPRESSION:   1. Atrophy and chronic white matter disease.  2. Nothing acute and no interval change.    Procedures:      Digital Block     PROCEDURE:  Digital Block  LOCATION:  Right 5th toe  ANESTHESIA: Digital block using bupivacaine without epinephrine  PROCEDURE NOTE: The patient tolerated the procedure well with good relief of his discomfort and there were no complications.[EF1.2]     Laceration Repair      LACERATION:  A simple clean 2.5 cm laceration.    LOCATION:  Dorsal base right 5th toe.    FUNCTION:  Distally sensation, circulation, motor and tendon function are intact.    ANESTHESIA:  See above.    PREPARATION:  Irrigation and Scrubbing with Normal Saline and Shur Clens.    DEBRIDEMENT:  no debridement.    CLOSURE:  Wound  was closed with One Layer.  Skin closed with 4 x 4.0 Ethilon using interrupted sutures..     Splint Placement    PLACEMENT: Alumafoam splint was applied to the left index finger and after placement I checked and adjusted the fit to ensure proper positioning. The patient was more comfortable with the splint in place. Sensation and circulation are intact after splint placement.[EF1.4]       Laboratory:[EF1.1]  CBC: WBC 8.7 (WNL) HGB 11.8 (L)  (WNL) RBC 4.05 (L) HCT 37.9 9L) MCHC 31.1 (L)  CMP: Cr 0.85 (WNL) Glucose 124 (H) Carbon dioxide 35 (H) Rest WNL  0924: Troponin I: 0.01 (WNL)  INR: 0.88 (WNL)  Lipase: 89 (WNL)  Lactate: 1.6 (WNL)[EF1.2]     Interventions:[EF1.1]  0926: Morphine, 4 mg, IV  0926: Zofran, 4 mg, IV[EF1.2]    ED Course:  The patient arrived by ambulance. He was escorted to the ED where his vitals were measured and recorded.   Nursing notes and past medical history reviewed.   I performed a physical examination of the patient as documented above.  I explained the plan with the patient who consents to this.[EF1.1]   An ECG was obtained.   Blood was drawn and sent to the laboratory for testing, see above results.   A peripheral IV was established.   The patient received the above interventions.   The patient underwent the above imaging studies.   The patient underwent a laceration repair, as above.[EF1.4]  1304: Called and spoke with Gary the [facility] nurse about the patient ([phone]).[EF1.5]  1325: Discussed the patient with Dr. Huerta from the hospitalist service.[EF1.4]  I personally reviewed the laboratory and imaging results with the Patient[EF1.1] and his family[EF1.4] and answered all related questions prior to[EF1.1] admitting.  Findings and plan explained to the Patient and spouse who consents to admission. Discussed the patient with Dr. Huerta, who will admit the patient to an OBS bed for further monitoring, evaluation, and treatment.[EF1.4]      Impression & Plan       Medical Decision Making:[EF1.1]  The patient is currently on hospice due to COPD and is quite hard of hearing. Therefore, the history is quite difficult to obtain. He is able to answer simple questions but unfortunately I cannot have him full answer anything. I unsure the exact reason why he has new onset weakness but he has had a fall and suffered an injury to his left hand and has a fracture of his index finger and his laceration of his right 5th toe. This area was sutured. I did splint his finger. The patient's head CT is negative and his labs were quite reassuring. I do not think he is likely having a GI bleed or primary cardiac event. He does not appear to have any worse respiratory distress than usual per the family. He did require admission to the hospital and was admitted under observation status. I discussed the case with hospice, who will work discharge planning with him as well, and he was admitted in good condition.[EF1.4]     Diagnosis:[EF1.1]    ICD-10-CM    1. Closed head injury, initial encounter S09.90XA    2. Closed displaced fracture of middle phalanx of left index finger, initial encounter S62.621A    3. Generalized muscle weakness M62.81[EF1.6]          Disposition:[EF1.1]   Admit to an OBS bed under the care of Dr. Huerta.[EF1.4]     I, Wendi Petty, am serving as a scribe on 5/3/2017 at 8:43 AM to personally document services performed by Sal Eugene MD, based on my observations and the provider's statements to me.[EF1.1]         Sal Eugene MD  05/03/17 1428  [CF1.1]     Revision History        User Key Date/Time User Provider Type Action    > CF1.1 5/3/2017  2:28 PM Sal Eugene MD Physician Sign     EF1.6 5/3/2017  2:09 PM Wendi Petty Scribe Share     EF1.4 5/3/2017  1:59 PM Wendi Petty Screddie      EF1.5 5/3/2017  1:38 PM Wendi Petty Scribe Share     EF1.2 5/3/2017 11:53 AM Wendi Pettyibanabell Share     EF1.3 5/3/2017  9:51 AM Wendi Petty      EF1.1 5/3/2017  9:02 AM Wendi Petty Share                  Procedure Notes     No notes of this type exist for this encounter.         Progress Notes - Therapies (Notes from 05/01/17 through 05/04/17)      Progress Notes by Rachel lAmonte PT at 5/4/2017 10:34 AM     Author:  Rachel Almonte PT Service:  (none) Author Type:  Physical Therapist    Filed:  5/4/2017 10:34 AM Date of Service:  5/4/2017 10:34 AM Note Created:  5/4/2017 10:34 AM    Status:  Signed :  Rachel Almonte PT (Physical Therapist)          05/04/17 1000   Quick Adds   Type of Visit Initial PT Evaluation   Living Environment   Lives With spouse   Living Arrangements house   Number of Stairs to Enter Home 2   Living Environment Comment pt states he just stays on main level   Self-Care   Usual Activity Tolerance fair   Current Activity Tolerance poor   Equipment Currently Used at Home walker, rolling;wheelchair   Activity/Exercise/Self-Care Comment pt has walker and w/c at home.  On 3 L O2 at baseline.  pt is on hospice for end state COPD and CHF   Functional Level Prior   Ambulation 1-->assistive equipment   Transferring 1-->assistive equipment   Toileting 1-->assistive equipment   Bathing 2-->assistive person   Dressing 2-->assistive person   Eating 0-->independent   Communication 0-->understands/communicates without difficulty   Swallowing 0-->swallows foods/liquids without difficulty   Cognition 0 - no cognition issues reported   Fall history within last six months yes   Number of times patient has fallen within last six months 2   Prior Functional Level Comment pt is [place], needs repetition. Pt has had at least 2 falls recently.    General Information   Onset of Illness/Injury or Date of Surgery - Date 05/03/17   Referring Physician АНДРЕЙ Palacios   Patient/Family Goals Statement none stated at this time   Pertinent History of Current Problem (include personal factors and/or comorbidities that impact the POC) Karan  EDGARD Vaz is a 84 year old male who presents with a mechanical fall while leaving the bathroom today. He fell onto his right side landing on his knee. His wife heard him fall and called 911. Now when he tries to bear weight he has pain in the right knee. He also broke his finger on his left hand but this isn't bothering him very much.    Precautions/Limitations fall precautions;oxygen therapy device and L/min   General Observations pt up in chair, agreeable to PT   Cognitive Status Examination   Orientation orientation to person, place and time   Level of Consciousness alert   Follows Commands and Answers Questions 100% of the time   Personal Safety and Judgment intact   Memory intact   Pain Assessment   Patient Currently in Pain Yes, see Vital Sign flowsheet  (9/10 when standing)   Integumentary/Edema   Integumentary/Edema Comments ace wrap to R knee, splint to L finger   Posture    Posture Forward head position   Range of Motion (ROM)   ROM Comment pain with knee flex/ext B   Strength   Strength Comments pt able to demo LAQ, functional weakness evident with transfers   Bed Mobility   Bed Mobility Comments not tested   Transfer Skills   Transfer Comments sit to stand with mod A   Gait   Gait Comments pt walked 5 feet forward wtih WW and CGA/min A, great effort and increased pain on R WB'ing, pt unable to move back- needed chair brought behind him   Balance   Balance Comments UE support of walker   Sensory Examination   Sensory Perception Comments reports baseline tingling in fingers of both hands   Coordination   Coordination no deficits were identified   Muscle Tone   Muscle Tone no deficits were identified   General Therapy Interventions   Planned Therapy Interventions bed mobility training;gait training;transfer training;strengthening   Clinical Impression   Criteria for Skilled Therapeutic Intervention yes, treatment indicated   PT Diagnosis impaired mobility   Influenced by the following impairments pain,  "functional weakness   Functional limitations due to impairments impaired gait, decreased activity tolerance, high fall risk   Clinical Presentation Evolving/Changing   Clinical Presentation Rationale pt with new onset high levels of pain, hospice pt with CHF and COPD, not safe with mobility   Clinical Decision Making (Complexity) Moderate complexity   Therapy Frequency` 5 times/week   Predicted Duration of Therapy Intervention (days/wks) 5 days   Anticipated Discharge Disposition Transitional Care Facility;Home with Home Therapy   Risk & Benefits of therapy have been explained Yes   Patient, Family & other staff in agreement with plan of care Yes   [facility] TM \"6 Clicks\"   2016, Trustees of [facility], under license to [email or web address].  All rights reserved.   6 Clicks Short Forms Basic Mobility Inpatient Short Form   [facility] AM-PAC  \"6 Clicks\" V.2 Basic Mobility Inpatient Short Form   1. Turning from your back to your side while in a flat bed without using bedrails? 3 - A Little   2. Moving from lying on your back to sitting on the side of a flat bed without using bedrails? 2 - A Lot   3. Moving to and from a bed to a chair (including a wheelchair)? 2 - A Lot   4. Standing up from a chair using your arms (e.g., wheelchair, or bedside chair)? 2 - A Lot   5. To walk in hospital room? 3 - A Little   6. Climbing 3-5 steps with a railing? 2 - A Lot   Basic Mobility Raw Score (Score out of 24.Lower scores equate to lower levels of function) 14   Total Evaluation Time   Total Evaluation Time (Minutes) 16[KB1.1]        Revision History        User Key Date/Time User Provider Type Action    > KB1.1 5/4/2017 10:34 AM Rachel Almonte, PT Physical Therapist Sign                                                      INTERAGENCY TRANSFER FORM - LAB / IMAGING / EKG / EMG RESULTS   5/3/2017                       [facility] OBSERVATION DEPARTMENT: [phone]            Unresulted Labs "     None         Lab Results - 3 Days      UA with Microscopic [[id number]] (Abnormal)  Resulted: 05/03/17 1928, Result status: Final result    Ordering provider: Sal Eugene MD  05/03/17 1305 Resulting lab: [facility]    Specimen Information    Type Source Collected On   Urine Urine clean catch 05/03/17 1910          Components       Value Reference Range Flag Lab   Color Urine Yellow   FrRdHs   Appearance Urine Clear   FrRdHs   Glucose Urine Negative NEG mg/dL  FrRdHs   Bilirubin Urine Negative NEG  FrRdHs   Ketones Urine Negative NEG mg/dL  FrRdHs   Specific Gravity Urine 1.019 1.003 - 1.035  FrRdHs   Blood Urine Negative NEG  FrRdHs   pH Urine 5.0 5.0 - 7.0 pH  FrRdHs   Protein Albumin Urine Negative NEG mg/dL  FrRdHs   Urobilinogen mg/dL 0.0 0.0 - 2.0 mg/dL  FrRdHs   Nitrite Urine Negative NEG  FrRdHs   Leukocyte Esterase Urine Small NEG A FrRdHs   Source Unspecified Urine   FrRdHs   WBC Urine 6 0 - 2 /HPF H FrRdHs   RBC Urine <1 0 - 2 /HPF  FrRdHs   Squamous Epithelial /HPF Urine <1 0 - 1 /HPF  FrRdHs   Hyaline Casts 2 0 - 2 /LPF  FrRdHs            Comprehensive metabolic panel [[id number]] (Abnormal)  Resulted: 05/03/17 0959, Result status: Final result    Ordering provider: Sal Eugene MD  05/03/17 0902 Resulting lab: [facility]    Specimen Information    Type Source Collected On   Blood  05/03/17 0915          Components       Value Reference Range Flag Lab   Sodium 138 133 - 144 mmol/L  FrRdHs   Potassium 4.2 3.4 - 5.3 mmol/L  FrRdHs   Chloride 97 94 - 109 mmol/L  FrRdHs   Carbon Dioxide 34 20 - 32 mmol/L H FrRdHs   Anion Gap 7 3 - 14 mmol/L  FrRdHs   Glucose 124 70 - 99 mg/dL H FrRdHs   Urea Nitrogen 25 7 - 30 mg/dL  FrRdHs   Creatinine 0.85 0.66 - 1.25 mg/dL  FrRdHs   GFR Estimate 85 >60 mL/min/1.7m2  FrRdHs   Comment:  Non  GFR Calc   GFR Estimate If Black -- >60 mL/min/1.7m2  FrRdHs   Result:         >90   GFR Calc      Calcium 9.3 8.5 - 10.1 mg/dL  FrRdHs   Result:     Bilirubin Total 0.3 0.2 - 1.3 mg/dL  FrRdHs   Albumin 3.8 3.4 - 5.0 g/dL  FrRdHs   Protein Total 7.4 6.8 - 8.8 g/dL  FrRdHs   Alkaline Phosphatase 88 40 - 150 U/L  FrRdHs   ALT 23 0 - 70 U/L  FrRdHs   AST 27 0 - 45 U/L  FrRdHs            Lipase [[id number]]  Resulted: 05/03/17 0959, Result status: Final result    Ordering provider: Sal Eugene MD  05/03/17 0902 Resulting lab: [facility]    Specimen Information    Type Source Collected On   Blood  05/03/17 0915          Components       Value Reference Range Flag Lab   Lipase 89 73 - 393 U/L  FrRdHs            INR [[id number]]  Resulted: 05/03/17 0948, Result status: Final result    Ordering provider: Sal Eugene MD  05/03/17 0902 Resulting lab: [facility]    Specimen Information    Type Source Collected On   Blood  05/03/17 0915          Components       Value Reference Range Flag Lab   INR 0.88 0.86 - 1.14  FrRdHs            Lactic acid whole blood [[id number]]  Resulted: 05/03/17 0940, Result status: Final result    Ordering provider: Sal Eugene MD  05/03/17 0902 Resulting lab: [facility]    Specimen Information    Type Source Collected On   Blood  05/03/17 0915          Components       Value Reference Range Flag Lab   Lactic Acid 1.6 0.7 - 2.1 mmol/L  FrRdHs            CBC with platelets differential [[id number]] (Abnormal)  Resulted: 05/03/17 0934, Result status: Final result    Ordering provider: Sal Eugene MD  05/03/17 0902 Resulting lab: [facility]    Specimen Information    Type Source Collected On   Blood  05/03/17 0915          Components       Value Reference Range Flag Lab   WBC 8.7 4.0 - 11.0 10e9/L  FrRdHs   RBC Count 4.05 4.4 - 5.9 10e12/L L FrRdHs   Hemoglobin 11.8 13.3 - 17.7 g/dL L FrRdHs   Hematocrit 37.9 40.0 - 53.0 % L FrRdHs   MCV 94 78 - 100 fl  FrRdHs   MCH 29.1 26.5 - 33.0 pg  FrRdHs   MCHC  31.1 31.5 - 36.5 g/dL L FrRdHs   RDW 14.1 10.0 - 15.0 %  FrRdHs   Platelet Count 291 150 - 450 10e9/L  FrRdHs   Diff Method Automated Method   FrRdHs   % Neutrophils 75.4 %  FrRdHs   % Lymphocytes 11.4 %  FrRdHs   % Monocytes 8.5 %  FrRdHs   % Eosinophils 2.4 %  FrRdHs   % Basophils 0.3 %  FrRdHs   % Immature Granulocytes 2.0 %  FrRdHs   Nucleated RBCs 0 0 /100  FrRdHs   Absolute Neutrophil 6.6 1.6 - 8.3 10e9/L  FrRdHs   Absolute Lymphocytes 1.0 0.8 - 5.3 10e9/L  FrRdHs   Absolute Monocytes 0.7 0.0 - 1.3 10e9/L  FrRdHs   Absolute Eosinophils 0.2 0.0 - 0.7 10e9/L  FrRdHs   Absolute Basophils 0.0 0.0 - 0.2 10e9/L  FrRdHs   Abs Immature Granulocytes 0.2 0 - 0.4 10e9/L  FrRdHs   Absolute Nucleated RBC 0.0   FrRdHs            Testing Performed By     Lab - Abbreviation Name Director Address Valid Date Range    12 - FrRdHs [facility] Unknown [address] [facility] [id number] 05/08/15 1057 - Present               Imaging Results - 3 Days      XR Knee Right 3 Views [[id number]]  Resulted: 05/03/17 2254, Result status: Final result    Ordering provider: Clotilde Huerta PA-C  05/03/17 1500 Resulted by: Trevor Jack MD    Performed: 05/03/17 1650 - 05/03/17 1702 Resulting lab: RADIOLOGY RESULTS    Narrative:       KNEE RIGHT THREE VIEWS  5/3/2017 5:02 PM     HISTORY: Right knee pain after fall    COMPARISON: None.      Impression:       IMPRESSION: No acute bony abnormalities. Mild medial joint space  narrowing. Vascular calcifications. No joint space effusion.    TREVOR JACK MD      XR Hand Left G/E 3 Views [[id number]]  Resulted: 05/03/17 1055, Result status: Final result    Ordering provider: Sal Eugene MD  05/03/17 0902 Resulted by: Uli Moe MD    Performed: 05/03/17 1008 - 05/03/17 1045 Resulting lab: RADIOLOGY RESULTS    Narrative:       XR HAND LT G/E 3 VW 5/3/2017 10:45 AM    COMPARISON: None.    HISTORY: Pain after fall, concern for fracture.       Impression:       IMPRESSION: Possible minimally displaced avulsion type fracture at the  left second proximal interphalangeal joint. Osteopenia is noted about  the left hand. No other findings concerning for fracture.    ULI MOE      Pelvis XR, 1-2 views [[id number]]  Resulted: 05/03/17 1054, Result status: Final result    Ordering provider: Sal Eugene MD  05/03/17 0902 Resulted by: Uli Moe MD    Performed: 05/03/17 1009 - 05/03/17 1047 Resulting lab: RADIOLOGY RESULTS    Narrative:       XR FEMUR RT 2 VW, XR PELVIS 1/2 VW 5/3/2017 10:48 AM    COMPARISON: None.    HISTORY: Pain after fall      Impression:       IMPRESSION: Postoperative changes of bilateral total hip arthroplasty.  Hardware appears intact. Left arthroplasty is not completely  visualized. No fractures are seen.    ULI MOE      Femur XR, 2 views, right [[id number]]  Resulted: 05/03/17 1054, Result status: Final result    Ordering provider: Sal Eugene MD  05/03/17 0902 Resulted by: Uli Moe MD    Performed: 05/03/17 1009 - 05/03/17 1048 Resulting lab: RADIOLOGY RESULTS    Narrative:       XR FEMUR RT 2 VW, XR PELVIS 1/2 VW 5/3/2017 10:48 AM    COMPARISON: None.    HISTORY: Pain after fall      Impression:       IMPRESSION: Postoperative changes of bilateral total hip arthroplasty.  Hardware appears intact. Left arthroplasty is not completely  visualized. No fractures are seen.    ULI WILLSALD      Foot  XR, G/E 3 views, right [[id number]]  Resulted: 05/03/17 1054, Result status: Final result    Ordering provider: Sal Eugene MD  05/03/17 0902 Resulted by: Uli Moe MD    Performed: 05/03/17 1009 - 05/03/17 1046 Resulting lab: RADIOLOGY RESULTS    Narrative:       XR FOOT RT G/E 3 VW 5/3/2017 10:46 AM    COMPARISON: None.    HISTORY: Laceration at the base of the fifth toe, concern for foreign  body or fracture.      Impression:       IMPRESSION: No displaced  fractures are seen in the right foot. No  radiodense foreign bodies.    LIANG WARD      CT Head w/o Contrast [[id number]]  Resulted: 05/03/17 1037, Result status: Final result    Ordering provider: Sal Eugene MD  05/03/17 0902 Resulted by: Karishma Coker MD    Performed: 05/03/17 1001 - 05/03/17 1019 Resulting lab: RADIOLOGY RESULTS    Narrative:       CT HEAD WITHOUT CONTRAST  5/3/2017 10:19 AM    HISTORY: Headache after fall.    TECHNIQUE: Scans were obtained through the head without IV contrast.  Radiation dose for this scan was reduced using automated exposure  control, adjustment of the mA and/or kV according to patient size, or  iterative reconstruction technique.    COMPARISON: 10/11/2016.    FINDINGS: Moderate atrophy. Low-density change in the white matter of  both hemispheres consistent with chronic small vessel ischemic  disease. No hemorrhage, mass lesion, or focal area of acute infarction  identified. Paranasal sinuses are normal. No bony abnormality. No  interval change.      Impression:       IMPRESSION:   1. Atrophy and chronic white matter disease.  2. Nothing acute and no interval change.    KARISHMA COKER MD      CT Cervical Spine w/o Contrast [[id number]]  Resulted: 05/03/17 1037, Result status: Final result    Ordering provider: Sal Eugene MD  05/03/17 0902 Resulted by: Karishma Coker MD    Performed: 05/03/17 1001 - 05/03/17 1022 Resulting lab: RADIOLOGY RESULTS    Narrative:       CT CERVICAL SPINE WITHOUT CONTRAST  5/3/2017 10:22 AM    HISTORY:  Neck pain after trauma.    COMPARISON: None.    TECHNIQUE: Routine CT cervical spine to T1. Multiplanar  reconstruction. Radiation dose for this scan was reduced using  automated exposure control, adjustment of the mA and/or kV according  to patient size, or iterative reconstruction technique.    FINDINGS: Slight anterior subluxation of C4 on C5 due to facet joint  disease. Otherwise normal alignment through T1. No  fracture. No  acute-appearing abnormality. Degenerative changes as follows:    C2-C3: Advanced bilateral facet joint disease.    C3-C4: Moderate ventral ridging. Advanced facet joint disease, left  more than right. Moderate left-sided foraminal stenosis.    C4-C5: Anterior subluxation of C4 on C5 due to advanced facet joint  disease which is worse on the left. Moderate left-sided foraminal  stenosis.    C5-C6: Moderate narrowing of the interspace. Mild ventral ridging.  Moderate foraminal stenosis on the right.    C6-C7: Mild ventral ridge. No significant central or lateral stenosis.    C7-T1: Negative.      Impression:       IMPRESSION:  1. Multilevel degenerative changes described.  2. No fracture or acute abnormality.    KARISHMA COKER MD      Testing Performed By     Lab - Abbreviation Name Director Address Valid Date Range    104 - Rad Rslts RADIOLOGY RESULTS Unknown Unknown 02/16/05 1553 - Present            Encounter-Level Documents:     There are no encounter-level documents.      Order-Level Documents:     There are no order-level documents.

## 2017-05-03 NOTE — ED NOTES
[facility]  ED Nurse Handoff Report    Karan Vaz is a 84 year old male   ED Chief complaint: Leg Pain  . ED Diagnosis:   Final diagnoses:   Closed head injury, initial encounter   Closed displaced fracture of middle phalanx of left index finger, initial encounter   Generalized muscle weakness     Allergies:   Allergies   Allergen Reactions     No Known Allergies        Code Status: Pt is on hospice  Activity level - Baseline/Home:  Total Care. Activity Level - Current:   Total Care. Pt uses wheelchair and walker, does not walk much at home. Lift room needed: Yes. Bariatric: No   Needed: No   Isolation: No. Infection: Not Applicable.     Vital Signs:   Vitals:    05/03/17 1330 05/03/17 1335 05/03/17 1340 05/03/17 1350   BP:   129/64    Pulse:       Resp:       Temp:       TempSrc:       SpO2: 96% 95% 95% 94%     Cardiac Rhythm:  ,   Cardiac  Cardiac Rhythm: Normal sinus rhythm  Pain level:    Patient confused: No. Patient Falls Risk: Yes.     Patient Report - Initial Complaint: Fall. Focused Assessment:   09:00 Cardiac Cardiac Monitoring - EKG Monitoring: Yes Cardiac Regularity: Regular Cardiac Rhythm: NSR AS    09:00 Respiratory Respiratory - Rhythm/Pattern: shortness of breath reported (Pt states his breathing pattern is at baseline) AS    09:00 Musculoskeletal Musculoskeletal - Side: Left Pain Body Location: hand CMS Intact: Yes AS    09:00 Skin Color/Condition Skin - Skin Comment: Pt has laceration between 4th and 5th toe       Tests Performed: see epic. Abnormal Results: see epicTreatments provided: see epic Family Comments: at bedside  OBS brochure/video discussed/provided to patient:  Yes  ED Medications:   Medications   lidocaine 1 % 1 mL (not administered)   lidocaine (LMX4) kit (not administered)   sodium chloride (PF) 0.9% PF flush 3 mL (not administered)   sodium chloride (PF) 0.9% PF flush 3 mL (not administered)   0.9% sodium chloride infusion ( Intravenous New Bag 5/3/17  0930)   ondansetron (ZOFRAN) injection 4 mg (4 mg Intravenous Given 5/3/17 0926)   lidocaine (PF) (XYLOCAINE) 1 % injection (not administered)   morphine (PF) injection 4 mg (4 mg Intravenous Given 5/3/17 0926)     Drips infusing:  Yes NS IVF 100ml/hr     Pt is very hard of hearing. Able to use call bell when needed.     ED Nurse Name/Phone Number: Noemí Vergara,   1:57 PM    RECEIVING UNIT ED HANDOFF REVIEW    Above ED Nurse Handoff Report was reviewed:  YES  Reviewed by: Kate Soliman on May 3, 2017 at 2:11 PM

## 2017-05-03 NOTE — ED NOTES
Observation Brochure and Video   Patient and family informed of observation status based on provider's order. Observation Brochure was given and video watched.  Noemí Vergara RN

## 2017-05-03 NOTE — PLAN OF CARE
Problem: Discharge Planning  Goal: Discharge Planning (Adult, OB, Behavioral, Peds)  Outcome: No Change  ROOM # 215     Living Situation (if not independent, order SW consult): home with wife  : Britt (wife)      Activity level at baseline: Independent with walker, doesn't walk much.  Activity level on admit: Assist of 2         Patient registered to observation; given Patient Bill of Rights; given the opportunity to ask questions about observation status and their plan of care.  Patient has been oriented to the observation room, bathroom and call light is in place.     Discussed discharge goals and expectations with patient/family.

## 2017-05-03 NOTE — PLAN OF CARE
Problem: Discharge Planning  Goal: Discharge Planning (Adult, OB, Behavioral, Peds)  Outcome: No Change  PRIMARY DIAGNOSIS: Mechanical Fall/Placement  Primary Symptoms: RLE pain, inability to walk.      OUTPATIENT/OBSERVATION GOALS TO BE MET BEFORE DISCHARGE:     1. Orthostatic symptoms (BP decrease or HR increase with patient upright)?  No  2. Vital Signs stable? Yes  3. Documented urine output: No  4. Tolerating PO fluid: Yes  5. Symptoms improved: No, patient just arrived on unit.  6. Labs WNL? Yes  7. ADLs back to baseline?  No  8. Activity and level of assistance: Assist of 2 pivoted into bed.   9. Pain status: Improved-controlled with oral pain medications.  10. Barriers to discharge noted Yes, SW and PT consults for safe discharge.

## 2017-05-03 NOTE — ED PROVIDER NOTES
History     Chief Complaint:  Leg Pain    HPI   History is limited due to patient being hard of hearing:    Karan Vaz is a 84 year old male with a history of COPD and neuropathy who presents with fall. The patient reports that he fell while getting off the commode this morning, hitting his right hip, back of his head, and cutting his right foot. EMS was called, and he was brought to the ED. While in the ED, he reports that he felt fine prior to the fall and did not feel lightheaded prior. He currently reports nausea, right hip pain, and headache. He denies recent illness or diarrhea.       Allergies:  NKDA     Medications:    Mapap  Cymbalta  Neurontin  Flomax  Ultram  Senokot  Lopressor  Albuterol nebulizer  Spiriva HandiHaler  Formoterol nebulizer  Prilosec  Desyrel   Demadex  Pulmicort  Wellbutrin  Singulair   Proair  Simethicone     Past Medical History:    Bladder neck obstruction  COPD  Chronic insomnia  Elastofibroma thoracic wall, benign  GERD  Moderate persistent asthma  Myoclonus  RLS  Sensory neuropathy   Hypertrophy of prostate with urinary retention    Past Surgical History:    Arthroplasty hip bilateral  Arthroscopy shoulder distal clavicle repair  Revise median carpal tunnel surgery x2  Sigmoidoscopy flexible     Family History:  History reviewed.  No significant family history.     Social History:  Relationship status:   The patient formerly smoked (Quit date 1/1/1990).   The patient does not drink alcohol.   The patient lives at home with his wife.        Review of Systems   Gastrointestinal: Positive for nausea. Negative for diarrhea and vomiting.   Musculoskeletal:        Positive for right hip pain.  Positive for thumb pain.   Skin: Positive for wound (Right foot laceration).   Neurological: Positive for headaches. Negative for dizziness and light-headedness.   All other systems reviewed and are negative.      Physical Exam     Patient Vitals for the past 24 hrs:   BP Temp Temp src  Pulse Heart Rate Resp SpO2   05/03/17 1138 - - - - 75 - 97 %   05/03/17 1135 - - - - 76 - 96 %   05/03/17 1130 - - - - 76 - 97 %   05/03/17 1125 135/78 - - - 75 - 96 %   05/03/17 1124 - - - - 77 - 96 %   05/03/17 1120 - - - - 76 - 96 %   05/03/17 1110 149/81 - - - 76 - 97 %   05/03/17 1105 145/82 - - - - - -   05/03/17 1056 - - - - - - 96 %   05/03/17 0945 - - - - - - 96 %   05/03/17 0940 113/61 - - - - - 97 %   05/03/17 0935 - - - - - - 97 %   05/03/17 0925 137/66 - - - - - -   05/03/17 0910 141/63 - - - - - -   05/03/17 0905 - - - - - - 100 %   05/03/17 0854 158/70 98  F (36.7  C) Oral 76 - 24 100 %            Physical Exam  General: The patient is alert, in no respiratory distress.    HENT: Mucous membranes moist.    Cardiovascular: Regular rate and rhythm. Good pulses in all four extremities. Normal capillary refill and skin turgor.     Respiratory: Lungs are clear. No nasal flaring. No retractions. No wheezing, no crackles.    Gastrointestinal: Abdomen soft. No guarding, no rebound. No palpable hernias. Large abdomen and discomfort with palpation.    Musculoskeletal: No gross deformity.  Left thumb is tender. The right hip is tender. Tenderness over his occiput.    Skin: No rashes or petechiae. 2.5 cm laceration over the dorsal base of the right 5th toe    Neurologic: hard of hearing, can answer limited loud questions and follows basic commands.    Lymphology: No cervical adenopathy. No lower extremity swelling.    Psychiatric: The patient is non-tearful.    Emergency Department Course     ECG @ 0916  Indication: Fall  Rate 73 bpm.   SD interval 134 ms.   QRS duration 78 ms.   QT/QTc 378/416 ms.   P-R-T axes -48.  Notes: Sinus rhythm with premature supraventricular complexes   Time read 0919    Imaging:  Radiographic findings were communicated with the patient who voiced understanding of the findings.    Right Femur XR per radiology:   IMPRESSION: Postoperative changes of bilateral total hip  arthroplasty.  Hardware appears intact. Left arthroplasty is not completely  visualized. No fractures are seen.    Pelvis XR per radiology:   IMPRESSION: Postoperative changes of bilateral total hip arthroplasty.  Hardware appears intact. Left arthroplasty is not completely  visualized. No fractures are seen.    Right Foot XR per radiology:   IMPRESSION: No displaced fractures are seen in the right foot. No  radiodense foreign bodies.    Left Hand XR per radiology:   IMPRESSION: Possible minimally displaced avulsion type fracture at the  left second proximal interphalangeal joint. Osteopenia is noted about  the left hand. No other findings concerning for fracture.    Cervical spine CT, without contrast, per radiology:   IMPRESSION:  1. Multilevel degenerative changes described.  2. No fracture or acute abnormality.    Head CT, without contrast, as per radiology:   IMPRESSION:   1. Atrophy and chronic white matter disease.  2. Nothing acute and no interval change.    Procedures:      Digital Block     PROCEDURE:  Digital Block  LOCATION:  Right 5th toe  ANESTHESIA: Digital block using bupivacaine without epinephrine  PROCEDURE NOTE: The patient tolerated the procedure well with good relief of his discomfort and there were no complications.     Laceration Repair      LACERATION:  A simple clean 2.5 cm laceration.    LOCATION:  Dorsal base right 5th toe.    FUNCTION:  Distally sensation, circulation, motor and tendon function are intact.    ANESTHESIA:  See above.    PREPARATION:  Irrigation and Scrubbing with Normal Saline and Shur Clens.    DEBRIDEMENT:  no debridement.    CLOSURE:  Wound was closed with One Layer.  Skin closed with 4 x 4.0 Ethilon using interrupted sutures..     Splint Placement    PLACEMENT: Alumafoam splint was applied to the left index finger and after placement I checked and adjusted the fit to ensure proper positioning. The patient was more comfortable with the splint in place. Sensation and  circulation are intact after splint placement.       Laboratory:  CBC: WBC 8.7 (WNL) HGB 11.8 (L)  (WNL) RBC 4.05 (L) HCT 37.9 9L) MCHC 31.1 (L)  CMP: Cr 0.85 (WNL) Glucose 124 (H) Carbon dioxide 35 (H) Rest WNL  0924: Troponin I: 0.01 (WNL)  INR: 0.88 (WNL)  Lipase: 89 (WNL)  Lactate: 1.6 (WNL)     Interventions:  0926: Morphine, 4 mg, IV  0926: Zofran, 4 mg, IV    ED Course:  The patient arrived by ambulance. He was escorted to the ED where his vitals were measured and recorded.   Nursing notes and past medical history reviewed.   I performed a physical examination of the patient as documented above.  I explained the plan with the patient who consents to this.   An ECG was obtained.   Blood was drawn and sent to the laboratory for testing, see above results.   A peripheral IV was established.   The patient received the above interventions.   The patient underwent the above imaging studies.   The patient underwent a laceration repair, as above.  1304: Called and spoke with Gary, the [facility] nurse about the patient ([phone]).  1325: Discussed the patient with Dr. Huerta from the hospitalist service.  I personally reviewed the laboratory and imaging results with the Patient and his family and answered all related questions prior to admitting.  Findings and plan explained to the Patient and spouse who consents to admission. Discussed the patient with Dr. Huerta, who will admit the patient to an OBS bed for further monitoring, evaluation, and treatment.      Impression & Plan      Medical Decision Making:  The patient is currently on hospice due to COPD and is quite hard of hearing. Therefore, the history is quite difficult to obtain. He is able to answer simple questions but unfortunately I cannot have him full answer anything. I unsure the exact reason why he has new onset weakness but he has had a fall and suffered an injury to his left hand and has a fracture of his index finger and his  laceration of his right 5th toe. This area was sutured. I did splint his finger. The patient's head CT is negative and his labs were quite reassuring. I do not think he is likely having a GI bleed or primary cardiac event. He does not appear to have any worse respiratory distress than usual per the family. He did require admission to the hospital and was admitted under observation status. I discussed the case with hospice, who will work discharge planning with him as well, and he was admitted in good condition.     Diagnosis:    ICD-10-CM    1. Closed head injury, initial encounter S09.90XA    2. Closed displaced fracture of middle phalanx of left index finger, initial encounter S62.621A    3. Generalized muscle weakness M62.81          Disposition:   Admit to an OBS bed under the care of Dr. Huerta.     Wendi PEARCE, am serving as a scribe on 5/3/2017 at 8:43 AM to personally document services performed by Sal Eugene MD, based on my observations and the provider's statements to me.         Sal Eugene MD  05/03/17 7145

## 2017-05-03 NOTE — ED NOTES
ABCs intact. Pt hx COPD, uses 4L NC continuous, and uses a walker and wheelchair to get around. Pt fell getting off toilet. Pt c/o L Leg pain. Pt has laceration between 4th and 5th toe on R foot. Pt BIBA.

## 2017-05-03 NOTE — H&P
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 Unfortunately, he is unable to ambulate in the ED and they have requested a 'non hospice related' admission for inability to walk and placement.    ED sign out by Dr. Eugene and chart review performed: Vitals show temp of 98, pulse 76, and pressure 158/70 with spontaneous respirations and no hypoxia. Labs remarkable for Creatinine 0.85, normal electrolytes, WBC 8.7, Hgb 11.8, troponin negative, INR 0.88. CT head and CT cervical spine negative. X ray pelvis negative. X ray left hand shows a minimally displaced avulsion fracture at the left second proximal interphalangeal joint. X rays right femur negative.       1. Right knee pain after mechanical fall  Patient is unable to bear weight because of right knee pain after a mechanical fall today. Imaging was done of his right foot, femur and pelvis but pain is specifically in his knee.  -X ray of right knee added  -Pain control with tylenol, low dose oxycodone and atarax. Hx of GI bleed so can not take ibuprofen.  -Instructed to nursing to elevate, compress and ice knee  -PT assessment  -Social work to help with disposition planning    2. Fall with left 2nd finger fracture  Mechanical fall with fracture of left 2nd finger.   -Pain control as needed.    3. Hospice patient  Continue all home meds. No vital sign checks or labs ordered.    Social: Hospice patient and  is Gary # [phone], or [facility] at [phone]  Code: DNR/DNI  VTE prophylaxis: None given hospice  Disposition: Observation                    Chief Complaint:   Weakness and inability to walk          History of Present Illness:   Karan Vaz is a 84 year old male who presents with a mechanical fall while leaving the bathroom today. He fell onto his right side landing on his knee. His wife heard him fall and called 911. Now when he tries to bear weight he has pain in the right knee. He also broke his finger on his left hand but this isn't bothering him very much. He did not lose consciousness when he fell or have complaints of lightheadedness or dizziness. He denies shortness of breath, cough, diarrhea, vomiting and urinary symptoms. He is a hospice patient and would prefer to continue hospice cares while here. He is also hoping to go home.     I spoke with Gary his RN hospice case manager who states that this is 'non hospice related' admission and therefore we should admit him like any other patient.              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     Gastro-oesophageal reflux disease      Moderate persistent asthma      Myoclonus     restless leg, nocturnal restless     Restless legs syndrome (RLS) 3/8/2010     Sensory neuropathy (H) 6/8/2011               Past Surgical History:     Past Surgical History:   Procedure Laterality Date     ARTHROPLASTY HIP BILATERAL       ARTHROSCOPY SHOULDER DISTAL CLAVICLE REPAIR  5/30/2012    Procedure:ARTHROSCOPY SHOULDER DISTAL CLAVICLE RESECTION; Left shoulder arthroscopy, Bicep tenontomy, debridement of partial thickness cuff tear, Subacromial Decompression.; Surgeon:MONIE MENDIETA; Location:[facility] OR     C SHOULDER SURG PROC UNLISTED      Shoulder     C SHOULDER SURG PROC UNLISTED      Shoulder     C SHOULDER SURG PROC UNLISTED      Shoulder     HC REVISE MEDIAN N/CARPAL TUNNEL SURG  7/01/02    simonet, left     HC REVISE MEDIAN N/CARPAL TUNNEL SURG  09/24/02      Urban left     PROST. MICROWAVE THERMOTX  2012    dr Sin      SIGMOIDOSCOPY FLEXIBLE N/A 2/16/2016    Procedure: SIGMOIDOSCOPY FLEXIBLE;  Surgeon: Ky Small MD;  Location:  GI               Social History:     Social History     Social History     Marital status:      Spouse name: Britt     Number of children: N/A     Years of education: 12     Occupational History      Retired          Social History Main Topics     Smoking status: Former Smoker     Quit date: 1/1/1990     Smokeless tobacco: Never Used      Comment: 20 years ago     Alcohol use No     Drug use: No     Sexual activity: Yes     Partners: Female     Other Topics Concern     Not on file     Social History Narrative               Family History:     Family History   Problem Relation Age of Onset     Colon Cancer No family hx of               Allergies:      Allergies   Allergen Reactions     No Known Allergies                Medications:     Prior to Admission medications    Medication Sig Last Dose Taking? Auth Provider   MAPAP 500 MG tablet TAKE 1 TABLET BY MOUTH EVERY 4 HOURS AS NEEDED AND 2 TABLETS AT BEDTIME. GENERIC FOR TYLENOL (ACETAMINOPHEN)   Nidia Mitchell APRN CNP   DULoxetine (CYMBALTA) 60 MG EC capsule TAKE ONE CAPSULE BY MOUTH ONE TIME DAILY    Nidia Mitchell APRN CNP   gabapentin (NEURONTIN) 300 MG capsule TAKE TWO CAPSULES BY MOUTH TWICE DAILY    Nidia Mitchell APRN CNP   gabapentin (NEURONTIN) 300 MG capsule TAKE TWO CAPSULES BY MOUTH TWICE DAILY    Nidia Mitchell APRN CNP   tamsulosin (FLOMAX) 0.4 MG capsule Take 1 capsule (0.4 mg) by mouth daily   Nidia Mitchell APRN CNP   traMADol (ULTRAM) 50 MG tablet Take 1 tablet (50 mg) by mouth 3 times daily as needed for moderate pain      senna-docusate (SENOKOT-S;PERICOLACE) 8.6-50 MG per tablet Take 2 tablets by mouth 2 times daily      metoprolol (LOPRESSOR) 25 MG tablet TAKE HALF TABLET BY MOUTH  TWICE DAILY    Nidia Mitchell APRN CNP   albuterol (2.5 MG/3ML) 0.083% neb solution INHALE 1 VIAL BY NEBULIZATION EVERY 2 HOURS AS NEEDED FOR DYSPNEA   Nidia Mitchell APRN CNP   tiotropium (SPIRIVA HANDIHALER) 18 MCG capsule INHALE 1 CAPSULE (18 MCG) BY INHALATION ROUTE ONCE DAILY   Nidia Mitchell APRN CNP   formoterol (PERFOROMIST) 20 MCG/2ML neb solution Take 2 mLs (20 mcg) by nebulization every 12 hours   Nidia Mitchell APRN CNP   acetaminophen (MAPAP) 500 MG tablet Take 2 tablets (1,000 mg) by mouth 2 times daily   Sindi Wilson APRN CNP   lidocaine (XYLOCAINE) 5 % ointment Apply topically 3 times daily as needed for moderate pain   Sindi Wilson APRN CNP   omeprazole (PRILOSEC) 20 MG CR capsule TAKE ONE CAPSULE BY MOUTH ONE TIME DAILY    Nidia Mitchell APRN CNP   traZODone (DESYREL) 50 MG tablet Take 50mg at bedtime.   Nidia Mitchell APRN CNP   torsemide (DEMADEX) 10 MG tablet Take 1 tablet (10 mg) by mouth daily   Nidia Mitchell APRN CNP   budesonide (PULMICORT) 0.5 MG/2ML neb solution Take 2 mLs (0.5 mg) by nebulization 2 times daily   Barron Handley MD   buPROPion (WELLBUTRIN XL) 150 MG 24 hr tablet TAKE ONE TABLET BY MOUTH EVERY NIGHT AT BEDTIME   Barron Handley MD   montelukast (SINGULAIR) 10 MG tablet TAKE ONE TABLET BY MOUTH EVERY NIGHT AT BEDTIME   Barron Handley MD   albuterol (PROAIR HFA, PROVENTIL HFA, VENTOLIN HFA) 108 (90 BASE) MCG/ACT inhaler Inhale 2 puffs into the lungs every 6 hours as needed for shortness of breath / dyspnea   Nidia Mitchell APRN CNP   Simethicone 180 MG CAPS Take 1 capsule by mouth 2 times daily      order for DME Equipment being ordered: Oxygen Titration visit [facility]  Fax # [phone]   Teagan Correa MD              Review of Systems:   A Comprehensive greater than 10 system review of systems was  carried out.  Pertinent positives and negatives are noted above.  Otherwise negative for contributory information.            Physical Exam:   Blood pressure 118/62, pulse 76, temperature 98  F (36.7  C), temperature source Oral, resp. rate 24, SpO2 94 %.  Exam:  GENERAL:  Comfortable.  PSYCH: pleasant, oriented, No acute distress.  HEENT:  PERRLA. Normal conjunctiva, normal hearing, nasal mucosa and Oropharynx are normal.  NECK:  Supple, no neck vein distention, adenopathy or bruits, normal thyroid.  HEART:  Normal S1, S2 with no murmur, no pericardial rub, gallops or S3 or S4.  LUNGS:  Clear to auscultation, normal Respiratory effort. No wheezing, rales or ronchi.  ABDOMEN:  Soft, no hepatosplenomegaly, normal bowel sounds. Non-tender, non distended.   EXTREMITIES:  No pedal edema, +2 pulses bilateral and equal. Right knee is slightly more swollen then left. No redness. Tender to flexion and palpation.  SKIN:  Dry to touch, No rash, wound or ulcerations.  NEUROLOGIC:  CN 2-12 grossly intact,  sensation is intact with no focal deficits.               Data:       Recent Labs  Lab 05/03/17  0915   WBC 8.7   HGB 11.8*   HCT 37.9*   MCV 94          Recent Labs  Lab 05/03/17  0915      POTASSIUM 4.2   CHLORIDE 97   CO2 34*   ANIONGAP 7   *   BUN 25   CR 0.85   GFRESTIMATED 85   GFRESTBLACK >90African American GFR Calc   GITA 9.3   PROTTOTAL 7.4   ALBUMIN 3.8   BILITOTAL 0.3   ALKPHOS 88   AST 27   ALT 23         Recent Results (from the past 24 hour(s))   CT Head w/o Contrast    Narrative    CT HEAD WITHOUT CONTRAST  5/3/2017 10:19 AM    HISTORY: Headache after fall.    TECHNIQUE: Scans were obtained through the head without IV contrast.  Radiation dose for this scan was reduced using automated exposure  control, adjustment of the mA and/or kV according to patient size, or  iterative reconstruction technique.    COMPARISON: 10/11/2016.    FINDINGS: Moderate atrophy. Low-density change in the white  matter of  both hemispheres consistent with chronic small vessel ischemic  disease. No hemorrhage, mass lesion, or focal area of acute infarction  identified. Paranasal sinuses are normal. No bony abnormality. No  interval change.      Impression    IMPRESSION:   1. Atrophy and chronic white matter disease.  2. Nothing acute and no interval change.    KARISHMA COKER MD   CT Cervical Spine w/o Contrast    Narrative    CT CERVICAL SPINE WITHOUT CONTRAST  5/3/2017 10:22 AM    HISTORY:  Neck pain after trauma.    COMPARISON: None.    TECHNIQUE: Routine CT cervical spine to T1. Multiplanar  reconstruction. Radiation dose for this scan was reduced using  automated exposure control, adjustment of the mA and/or kV according  to patient size, or iterative reconstruction technique.    FINDINGS: Slight anterior subluxation of C4 on C5 due to facet joint  disease. Otherwise normal alignment through T1. No fracture. No  acute-appearing abnormality. Degenerative changes as follows:    C2-C3: Advanced bilateral facet joint disease.    C3-C4: Moderate ventral ridging. Advanced facet joint disease, left  more than right. Moderate left-sided foraminal stenosis.    C4-C5: Anterior subluxation of C4 on C5 due to advanced facet joint  disease which is worse on the left. Moderate left-sided foraminal  stenosis.    C5-C6: Moderate narrowing of the interspace. Mild ventral ridging.  Moderate foraminal stenosis on the right.    C6-C7: Mild ventral ridge. No significant central or lateral stenosis.    C7-T1: Negative.      Impression    IMPRESSION:  1. Multilevel degenerative changes described.  2. No fracture or acute abnormality.    KARISHMA COKER MD   XR Hand Left G/E 3 Views    Narrative    XR HAND LT G/E 3 VW 5/3/2017 10:45 AM    COMPARISON: None.    HISTORY: Pain after fall, concern for fracture.      Impression    IMPRESSION: Possible minimally displaced avulsion type fracture at the  left second proximal interphalangeal joint.  Osteopenia is noted about  the left hand. No other findings concerning for fracture.    LIANG WARD   Foot  XR, G/E 3 views, right    Narrative    XR FOOT RT G/E 3 VW 5/3/2017 10:46 AM    COMPARISON: None.    HISTORY: Laceration at the base of the fifth toe, concern for foreign  body or fracture.      Impression    IMPRESSION: No displaced fractures are seen in the right foot. No  radiodense foreign bodies.    LIANG WARD   Pelvis XR, 1-2 views    Narrative    XR FEMUR RT 2 VW, XR PELVIS 1/2 VW 5/3/2017 10:48 AM    COMPARISON: None.    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  Hardware appears intact. Left arthroplasty is not completely  visualized. No fractures are seen.    LIANG WARD   Femur XR, 2 views, right    Narrative    XR FEMUR RT 2 VW, XR PELVIS 1/2 VW 5/3/2017 10:48 AM    COMPARISON: None.    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  Hardware appears intact. Left arthroplasty is not completely  visualized. No fractures are seen.    LIANG Huerta PA-C

## 2017-05-03 NOTE — PHARMACY-ADMISSION MEDICATION HISTORY
Admission medication history interview status for this patient is complete. See [facility] admission navigator for allergy information, prior to admission medications and immunization status.     Medication history interview source(s):Caregiver  Medication history resources (including written lists, pill bottles, clinic record):None  Primary pharmacy:[facility]    Changes made to PTA medication list:  Added: Artificial tears, atropine, bisacodyl, haloperidol, duoneb, lorazepam, methadone,morphine, miralax, prednisone, senna  Deleted: Formoterol, duplicate gabapentin, spiriva  Changed: Trazodone from 50mg to 25mg    Actions taken by pharmacist (provider contacted, etc):Called [facility] care RN ([phone]) for medication history     Additional medication history information:None    Medication reconciliation/reorder completed by provider prior to medication history? No    For patients on insulin therapy: No    Prior to Admission medications    Medication Sig Last Dose Taking? Auth Provider   ipratropium - albuterol 0.5 mg/2.5 mg/3 mL (DUONEB) 0.5-2.5 (3) MG/3ML neb solution Take 1 vial by nebulization 4 times daily 5/2/2017 at Unknown time Yes Unknown, Entered By History   methadone (DOLOPHINE) 5 MG tablet Take 2.5 mg by mouth every 12 hours 5/2/2017 at Unknown time Yes Unknown, Entered By History   predniSONE (DELTASONE) 10 MG tablet Take 10 mg by mouth daily 5/2/2017 at Unknown time Yes Unknown, Entered By History   sennosides (SENOKOT) 8.6 MG tablet Take 2 tablets by mouth 2 times daily 5/2/2017 at Unknown time Yes Unknown, Entered By History   DULoxetine (CYMBALTA) 60 MG EC capsule TAKE ONE CAPSULE BY MOUTH ONE TIME DAILY  5/2/2017 at Unknown time Yes Nidia Mitchell APRN CNP   gabapentin (NEURONTIN) 300 MG capsule TAKE TWO CAPSULES BY MOUTH TWICE DAILY  5/2/2017 at Unknown time Yes Nidia Mitchell APRN CNP   tamsulosin (FLOMAX) 0.4 MG capsule Take 1 capsule (0.4 mg) by mouth daily  5/2/2017 at Unknown time Yes Nidia Mitchell APRN CNP   traMADol (ULTRAM) 50 MG tablet Take 1 tablet (50 mg) by mouth 3 times daily as needed for moderate pain 5/2/2017 at Unknown time Yes    senna-docusate (SENOKOT-S;PERICOLACE) 8.6-50 MG per tablet Take 2 tablets by mouth 2 times daily 5/2/2017 at Unknown time Yes    metoprolol (LOPRESSOR) 25 MG tablet TAKE HALF TABLET BY MOUTH TWICE DAILY  5/2/2017 at Unknown time Yes Nidia Mitchell APRN CNP   albuterol (2.5 MG/3ML) 0.083% neb solution INHALE 1 VIAL BY NEBULIZATION EVERY 2 HOURS AS NEEDED FOR DYSPNEA 5/2/2017 at Unknown time Yes Nidia Mitchell APRN CNP   acetaminophen (MAPAP) 500 MG tablet Take 2 tablets (1,000 mg) by mouth 2 times daily 5/2/2017 at Unknown time Yes Sindi Wilson APRN CNP   omeprazole (PRILOSEC) 20 MG CR capsule TAKE ONE CAPSULE BY MOUTH ONE TIME DAILY  5/2/2017 at Unknown time Yes Nidia Mitchell APRN CNP   torsemide (DEMADEX) 10 MG tablet Take 1 tablet (10 mg) by mouth daily 5/2/2017 at Unknown time Yes Nidia Mitchell APRN CNP   budesonide (PULMICORT) 0.5 MG/2ML neb solution Take 2 mLs (0.5 mg) by nebulization 2 times daily 5/2/2017 at Unknown time Yes Barron Handley MD   buPROPion (WELLBUTRIN XL) 150 MG 24 hr tablet TAKE ONE TABLET BY MOUTH EVERY NIGHT AT BEDTIME 5/2/2017 at Unknown time Yes Barron Handley MD   montelukast (SINGULAIR) 10 MG tablet TAKE ONE TABLET BY MOUTH EVERY NIGHT AT BEDTIME 5/2/2017 at Unknown time Yes Barron Handley MD   albuterol (PROAIR HFA, PROVENTIL HFA, VENTOLIN HFA) 108 (90 BASE) MCG/ACT inhaler Inhale 2 puffs into the lungs every 6 hours as needed for shortness of breath / dyspnea 5/2/2017 at Unknown time Yes Nidia Mitchell APRN CNP   Simethicone 180 MG CAPS Take 1 capsule by mouth 2 times daily 5/2/2017 at Unknown time Yes    order for DME Equipment being ordered: Oxygen Titration visit  [facility]  Fax # [phone]  Yes Teagan Correa MD   traMADol (ULTRAM) 50 MG tablet Take 25 mg by mouth nightly as needed for moderate pain Unknown at Unknown time  Unknown, Entered By History   hypromellose-dextran 0.3-0.1% (ARTIFICIAL TEARS) opthalmic solution Place 1 drop into both eyes every hour as needed Unknown at Unknown time  Unknown, Entered By History   atropine 1 % ophthalmic solution Take 2-4 drops by mouth every 2 hours as needed for secretions Unknown at Unknown time  Unknown, Entered By History   bisacodyl (DULCOLAX) 10 MG Suppository Place 10 mg rectally 2 times daily as needed for constipation Unknown at Unknown time  Unknown, Entered By History   haloperidol (HALDOL) 0.5 MG tablet Take 0.5-1 mg by mouth every 6 hours as needed for agitation Unknown at Unknown time  Unknown, Entered By History   ipratropium - albuterol 0.5 mg/2.5 mg/3 mL (DUONEB) 0.5-2.5 (3) MG/3ML neb solution Take 1 vial by nebulization every 2 hours as needed for shortness of breath / dyspnea or wheezing Unknown at Unknown time  Unknown, Entered By History   LORazepam (ATIVAN) 0.5 MG tablet Take 0.25-0.5 mg by mouth every 4 hours as needed for anxiety Unknown at Unknown time  Unknown, Entered By History   morphine sulfate HIGH CONCENTRATE (ROXANOL *CONCENTRATED*) 20 mg/mL (HIGH CONC) solution Take 2.5-5 mg by mouth or place under tongue every 2 hours as needed for shortness of breath / dyspnea or moderate to severe pain Unknown at Unknown time  Unknown, Entered By History   polyethylene glycol (MIRALAX/GLYCOLAX) Packet Take 17 g by mouth daily as needed for constipation Unknown at Unknown time  Unknown, Entered By History   MAPAP 500 MG tablet TAKE 1 TABLET BY MOUTH EVERY 4 HOURS AS NEEDED AND 2 TABLETS AT BEDTIME. GENERIC FOR TYLENOL (ACETAMINOPHEN) Unknown at Unknown time  Nidia Mitchell APRN CNP   lidocaine (XYLOCAINE) 5 % ointment Apply topically 3 times daily as needed for moderate pain Unknown at  Unknown time  Sindi Wilson APRN CNP

## 2017-05-03 NOTE — IP AVS SNAPSHOT
MRN:[id number]                      After Visit Summary   5/3/2017    Karan Vaz    MRN: [id number]           Thank you!     Thank you for choosing [facility] for your care. Our goal is always to provide you with excellent care. Hearing back from our patients is one way we can continue to improve our services. Please take a few minutes to complete the written survey that you may receive in the mail after you visit. If you would like to speak to someone directly about your visit please contact Patient Relations at [phone]. Thank you!          Patient Information     Date Of Birth          10/7/1932        About your hospital stay     You were admitted on:  May 3, 2017 You last received care in the:  [facility] Observation Department    You were discharged on:  May 4, 2017        Reason for your hospital stay       Patient was admitted for right knee pain after fall. No fractures were identified on imaging. He was too weak to return home and was discharged to TCU for rehab with hopeful potential to return home.                  Who to Call     For medical emergencies, please call 911.  For non-urgent questions about your medical care, please call your primary care provider or clinic, [phone]          Attending Provider     Provider Specialty    Sal Eugene MD Emergency Medicine    MichaelleMiles maher MD Emergency Medicine    Fred Britt MD Internal Medicine       Primary Care Provider Office Phone # Fax #    Nidia EMI Esqueda [facility] [phone]       [facility] INT PRIM CARE [address] [place] [address]  [facility] [address]        After Care Instructions     Activity - Up with nursing assistance           Advance Diet as Tolerated       Follow this diet upon discharge: Regular            Fall precautions           General info for SNF       Length of Stay Estimate: Short Term Care: Estimated # of Days <30  Condition at  Discharge: Stable  Level of care:skilled   Rehabilitation Potential: Excellent  Admission H&P remains valid and up-to-date: Yes  Recent Chemotherapy: N/A  Use Nursing Home Standing Orders: Yes            Hip precautions           Mantoux instructions       Give two-step Mantoux (PPD) Per Facility Policy Yes            Oxygen - Nasal cannula       3 Lpm by nasal cannula to keep O2 sats 92% or greater.                  Follow-up Appointments     Follow Up and recommended labs and tests       Follow up with Nursing home physician.  No follow up labs or test are needed.                  Your next 10 appointments already scheduled     May 12, 2017  9:45 AM CDT   Return Visit with EMI Orta CNP   [facility] ([facility])    [address]  [address]  [facility] [address]   [phone]            Jun 09, 2017  9:45 AM CDT   [facility] Visit with EMI Orta CNP   [facility]    [address]  [address]  [facility] [address]   [phone]              Additional Services     Occupational Therapy Adult Consult       Evaluate and treat as clinically indicated.    Reason:  Weakness and right knee pain            Physical Therapy Adult Consult       Evaluate and treat as clinically indicated.    Reason:  Weakness and right knee pain                             Pending Results     No orders found for last 3 day(s).            Statement of Approval     Ordered          05/04/17 1612  I have reviewed and agree with all the recommendations and orders detailed in this document.  EFFECTIVE NOW     Approved and electronically signed by:  Clotilde Huerta PA-C           05/04/17 1517  I have reviewed and agree with all the recommendations and orders detailed in this document.  EFFECTIVE NOW     Approved and electronically signed by:  Clotilde Huerta PA-C             Admission  "Information     Date & Time Provider Department Dept. Phone    5/3/2017 Fred Britt MD [facility] Observation Department [phone]      Your Vitals Were     Blood Pressure Pulse Temperature Respirations Pulse Oximetry       131/63 73 97.1  F (36.2  C) (Oral) 18 94%       MyChart Information     MyChart lets you send messages to your doctor, view your test results, renew your prescriptions, schedule appointments and more. To sign up, go to [email or web address] Click on \"Log in\" on the left side of the screen, which will take you to the Welcome page. Then click on \"Sign up Now\" on the right side of the page.     You will be asked to enter the access code listed below, as well as some personal information. Please follow the directions to create your username and password.     Your access code is: [email or web address]  Expires: 2017 12:02 AM     Your access code will  in 90 days. If you need help or a new code, please call your [facility] clinic or [phone].        Care EveryWhere ID     This is your Care EveryWhere ID. This could be used by other organizations to access your [facility] medical records  [id number]           Review of your medicines      START taking        Dose / Directions    hydrOXYzine 25 MG tablet   Commonly known as:  ATARAX   Used for:  Acute pain of right knee        Dose:  25 mg   Take 1 tablet (25 mg) by mouth every 6 hours as needed for other (adjuvant pain)   Quantity:  20 tablet   Refills:  0       oxyCODONE 5 MG IR tablet   Commonly known as:  ROXICODONE   Used for:  Acute pain of right knee        Dose:  2.5-5 mg   Take 0.5-1 tablets (2.5-5 mg) by mouth every 3 hours as needed for moderate to severe pain   Quantity:  20 tablet   Refills:  0         CONTINUE these medicines which may have CHANGED, or have new prescriptions. If we are uncertain of the size of tablets/capsules you have at home, strength may be listed as something that might have changed.     "    Dose / Directions    * MAPAP 500 MG tablet   This may have changed:  Another medication with the same name was changed. Make sure you understand how and when to take each.   Used for:  Other chronic pain   Generic drug:  acetaminophen        Dose:  1000 mg   Take 2 tablets (1,000 mg) by mouth 2 times daily   Quantity:  240 tablet   Refills:  0       * acetaminophen 325 MG tablet   Commonly known as:  TYLENOL   This may have changed:  See the new instructions.   Used for:  Acute pain of right knee        Dose:  650 mg   Take 2 tablets (650 mg) by mouth every 6 hours as needed for mild pain   Quantity:  30 tablet   Refills:  0       * Notice:  This list has 2 medication(s) that are the same as other medications prescribed for you. Read the directions carefully, and ask your doctor or other care provider to review them with you.      CONTINUE these medicines which have NOT CHANGED        Dose / Directions    * albuterol 108 (90 BASE) MCG/ACT Inhaler   Commonly known as:  PROAIR HFA/PROVENTIL HFA/VENTOLIN HFA   Used for:  Mild intermittent asthma without complication        Dose:  2 puff   Inhale 2 puffs into the lungs every 6 hours as needed for shortness of breath / dyspnea   Quantity:  1 Inhaler   Refills:  6       * albuterol (2.5 MG/3ML) 0.083% neb solution   Used for:  Moderate persistent asthma without complication        INHALE 1 VIAL BY NEBULIZATION EVERY 2 HOURS AS NEEDED FOR DYSPNEA   Quantity:  540 mL   Refills:  0       atropine 1 % ophthalmic solution        Dose:  2-4 drop   Take 2-4 drops by mouth every 2 hours as needed for secretions   Refills:  0       bisacodyl 10 MG Suppository   Commonly known as:  DULCOLAX        Dose:  10 mg   Place 10 mg rectally 2 times daily as needed for constipation   Refills:  0       budesonide 0.5 MG/2ML neb solution   Commonly known as:  PULMICORT   Used for:  Chronic obstructive pulmonary disease, unspecified COPD type (H)        Dose:  0.5 mg   Take 2 mLs (0.5 mg)  by nebulization 2 times daily   Quantity:  60 mL   Refills:  3       buPROPion 150 MG 24 hr tablet   Commonly known as:  WELLBUTRIN XL   Used for:  Depression, major, recurrent, moderate (H)        TAKE ONE TABLET BY MOUTH EVERY NIGHT AT BEDTIME   Quantity:  90 tablet   Refills:  0       DULoxetine 60 MG EC capsule   Commonly known as:  CYMBALTA   Used for:  Depression, major, recurrent, moderate (H)        TAKE ONE CAPSULE BY MOUTH ONE TIME DAILY   Quantity:  90 capsule   Refills:  0       gabapentin 300 MG capsule   Commonly known as:  NEURONTIN   Used for:  Gastroesophageal reflux disease without esophagitis        TAKE TWO CAPSULES BY MOUTH TWICE DAILY   Quantity:  120 capsule   Refills:  0       hypromellose-dextran 0.3-0.1% opthalmic solution        Dose:  1 drop   Place 1 drop into both eyes every hour as needed   Refills:  0       * ipratropium - albuterol 0.5 mg/2.5 mg/3 mL 0.5-2.5 (3) MG/3ML neb solution   Commonly known as:  DUONEB        Dose:  1 vial   Take 1 vial by nebulization 4 times daily   Refills:  0       * ipratropium - albuterol 0.5 mg/2.5 mg/3 mL 0.5-2.5 (3) MG/3ML neb solution   Commonly known as:  DUONEB        Dose:  1 vial   Take 1 vial by nebulization every 2 hours as needed for shortness of breath / dyspnea or wheezing   Refills:  0       lidocaine 5 % ointment   Commonly known as:  XYLOCAINE   Used for:  Herpes zoster without complication        Apply topically 3 times daily as needed for moderate pain   Quantity:  50 g   Refills:  1       methadone 5 MG tablet   Commonly known as:  DOLOPHINE   Used for:  Pain of finger of left hand        Dose:  2.5 mg   Take 0.5 tablets (2.5 mg) by mouth every 12 hours   Quantity:  10 tablet   Refills:  0       metoprolol 25 MG tablet   Commonly known as:  LOPRESSOR   Used for:  Essential hypertension        TAKE HALF TABLET BY MOUTH TWICE DAILY   Quantity:  90 tablet   Refills:  0       montelukast 10 MG tablet   Commonly known as:  SINGULAIR   Used  for:  Allergic rhinitis due to pollen, unspecified rhinitis seasonality        TAKE ONE TABLET BY MOUTH EVERY NIGHT AT BEDTIME   Quantity:  90 tablet   Refills:  0       omeprazole 20 MG CR capsule   Commonly known as:  priLOSEC   Used for:  Gastroesophageal reflux disease without esophagitis        TAKE ONE CAPSULE BY MOUTH ONE TIME DAILY   Quantity:  30 capsule   Refills:  0       order for DME   Used for:  COPD exacerbation (H), Acute on chronic respiratory failure (H), Acute on chronic diastolic congestive heart failure (H)        Equipment being ordered: Oxygen Titration visit [facility] Fax # [phone]   Refills:  0       polyethylene glycol Packet   Commonly known as:  MIRALAX/GLYCOLAX        Dose:  17 g   Take 17 g by mouth daily as needed for constipation   Refills:  0       predniSONE 10 MG tablet   Commonly known as:  DELTASONE        Dose:  10 mg   Take 10 mg by mouth daily   Refills:  0       senna-docusate 8.6-50 MG per tablet   Commonly known as:  SENOKOT-S;PERICOLACE   Used for:  Constipation, unspecified constipation type        Dose:  2 tablet   Take 2 tablets by mouth 2 times daily   Quantity:  100 tablet   Refills:  3       sennosides 8.6 MG tablet   Commonly known as:  SENOKOT        Dose:  2 tablet   Take 2 tablets by mouth 2 times daily   Refills:  0       Simethicone 180 MG Caps        Dose:  1 capsule   Take 1 capsule by mouth 2 times daily   Quantity:  60 capsule   Refills:  0       tamsulosin 0.4 MG capsule   Commonly known as:  FLOMAX   Used for:  Hypertrophy of prostate with urinary retention        Dose:  0.4 mg   Take 1 capsule (0.4 mg) by mouth daily   Quantity:  90 capsule   Refills:  0       torsemide 10 MG tablet   Commonly known as:  DEMADEX   Used for:  Obstructive chronic bronchitis with exacerbation (H)        Dose:  10 mg   Take 1 tablet (10 mg) by mouth daily   Quantity:  30 tablet   Refills:  1       * Notice:  This list has 4 medication(s) that are the same as  other medications prescribed for you. Read the directions carefully, and ask your doctor or other care provider to review them with you.      STOP taking     haloperidol 0.5 MG tablet   Commonly known as:  HALDOL           LORazepam 0.5 MG tablet   Commonly known as:  ATIVAN           morphine sulfate HIGH CONCENTRATE 20 mg/mL (HIGH CONC) solution   Commonly known as:  ROXANOL *CONCENTRATED*           traMADol 50 MG tablet   Commonly known as:  ULTRAM                Where to get your medicines      Some of these will need a paper prescription and others can be bought over the counter. Ask your nurse if you have questions.     Bring a paper prescription for each of these medications     acetaminophen 325 MG tablet    hydrOXYzine 25 MG tablet    oxyCODONE 5 MG IR tablet         Information about where to get these medications is not yet available     ! Ask your nurse or doctor about these medications     methadone 5 MG tablet                Protect others around you: Learn how to safely use, store and throw away your medicines at www.disposemymeds.org.             Medication List: This is a list of all your medications and when to take them. Check marks below indicate your daily home schedule. Keep this list as a reference.      Medications           Morning Afternoon Evening Bedtime As Needed    * albuterol 108 (90 BASE) MCG/ACT Inhaler   Commonly known as:  PROAIR HFA/PROVENTIL HFA/VENTOLIN HFA   Inhale 2 puffs into the lungs every 6 hours as needed for shortness of breath / dyspnea                                * albuterol (2.5 MG/3ML) 0.083% neb solution   INHALE 1 VIAL BY NEBULIZATION EVERY 2 HOURS AS NEEDED FOR DYSPNEA                                atropine 1 % ophthalmic solution   Take 2-4 drops by mouth every 2 hours as needed for secretions                                bisacodyl 10 MG Suppository   Commonly known as:  DULCOLAX   Place 10 mg rectally 2 times daily as needed for constipation                                 budesonide 0.5 MG/2ML neb solution   Commonly known as:  PULMICORT   Take 2 mLs (0.5 mg) by nebulization 2 times daily   Last time this was given:  0.5 mg on 5/4/2017  8:23 AM                                buPROPion 150 MG 24 hr tablet   Commonly known as:  WELLBUTRIN XL   TAKE ONE TABLET BY MOUTH EVERY NIGHT AT BEDTIME   Last time this was given:  150 mg on 5/4/2017  9:00 AM                                DULoxetine 60 MG EC capsule   Commonly known as:  CYMBALTA   TAKE ONE CAPSULE BY MOUTH ONE TIME DAILY   Last time this was given:  60 mg on 5/4/2017  9:00 AM                                gabapentin 300 MG capsule   Commonly known as:  NEURONTIN   TAKE TWO CAPSULES BY MOUTH TWICE DAILY   Last time this was given:  600 mg on 5/4/2017  8:59 AM                                hydrOXYzine 25 MG tablet   Commonly known as:  ATARAX   Take 1 tablet (25 mg) by mouth every 6 hours as needed for other (adjuvant pain)   Last time this was given:  25 mg on 5/3/2017  8:23 PM                                hypromellose-dextran 0.3-0.1% opthalmic solution   Place 1 drop into both eyes every hour as needed                                * ipratropium - albuterol 0.5 mg/2.5 mg/3 mL 0.5-2.5 (3) MG/3ML neb solution   Commonly known as:  DUONEB   Take 1 vial by nebulization 4 times daily   Last time this was given:  3 mLs on 5/4/2017 11:42 AM                                * ipratropium - albuterol 0.5 mg/2.5 mg/3 mL 0.5-2.5 (3) MG/3ML neb solution   Commonly known as:  DUONEB   Take 1 vial by nebulization every 2 hours as needed for shortness of breath / dyspnea or wheezing   Last time this was given:  3 mLs on 5/4/2017 11:42 AM                                lidocaine 5 % ointment   Commonly known as:  XYLOCAINE   Apply topically 3 times daily as needed for moderate pain                                * MAPAP 500 MG tablet   Take 2 tablets (1,000 mg) by mouth 2 times daily   Last time this was given:  650 mg on  5/4/2017  2:07 PM   Generic drug:  acetaminophen                                * acetaminophen 325 MG tablet   Commonly known as:  TYLENOL   Take 2 tablets (650 mg) by mouth every 6 hours as needed for mild pain   Last time this was given:  650 mg on 5/4/2017  2:07 PM                                methadone 5 MG tablet   Commonly known as:  DOLOPHINE   Take 0.5 tablets (2.5 mg) by mouth every 12 hours   Last time this was given:  2.5 mg on 5/4/2017  9:00 AM                                metoprolol 25 MG tablet   Commonly known as:  LOPRESSOR   TAKE HALF TABLET BY MOUTH TWICE DAILY   Last time this was given:  12.5 mg on 5/4/2017  9:00 AM                                montelukast 10 MG tablet   Commonly known as:  SINGULAIR   TAKE ONE TABLET BY MOUTH EVERY NIGHT AT BEDTIME   Last time this was given:  10 mg on 5/3/2017  8:24 PM                                omeprazole 20 MG CR capsule   Commonly known as:  priLOSEC   TAKE ONE CAPSULE BY MOUTH ONE TIME DAILY   Last time this was given:  20 mg on 5/4/2017  8:59 AM                                order for DME   Equipment being ordered: Oxygen Titration visit [facility] Fax # [phone]                                oxyCODONE 5 MG IR tablet   Commonly known as:  ROXICODONE   Take 0.5-1 tablets (2.5-5 mg) by mouth every 3 hours as needed for moderate to severe pain   Last time this was given:  5 mg on 5/4/2017  6:44 AM                                polyethylene glycol Packet   Commonly known as:  MIRALAX/GLYCOLAX   Take 17 g by mouth daily as needed for constipation                                predniSONE 10 MG tablet   Commonly known as:  DELTASONE   Take 10 mg by mouth daily   Last time this was given:  10 mg on 5/4/2017  9:00 AM                                senna-docusate 8.6-50 MG per tablet   Commonly known as:  SENOKOT-S;PERICOLACE   Take 2 tablets by mouth 2 times daily   Last time this was given:  1 tablet on 5/4/2017  9:00 AM                                 sennosides 8.6 MG tablet   Commonly known as:  SENOKOT   Take 2 tablets by mouth 2 times daily                                Simethicone 180 MG Caps   Take 1 capsule by mouth 2 times daily                                tamsulosin 0.4 MG capsule   Commonly known as:  FLOMAX   Take 1 capsule (0.4 mg) by mouth daily   Last time this was given:  0.4 mg on 5/4/2017  9:00 AM                                torsemide 10 MG tablet   Commonly known as:  DEMADEX   Take 1 tablet (10 mg) by mouth daily   Last time this was given:  10 mg on 5/4/2017  9:00 AM                                * Notice:  This list has 6 medication(s) that are the same as other medications prescribed for you. Read the directions carefully, and ask your doctor or other care provider to review them with you.

## 2017-05-03 NOTE — IP AVS SNAPSHOT
[facility] Observation Department    [address]    [facility] [address]    Phone:  [phone]                                       After Visit Summary   5/3/2017    Karan Vaz    MRN: [id number]           After Visit Summary Signature Page     I have received my discharge instructions, and my questions have been answered. I have discussed any challenges I see with this plan with the nurse or doctor.    ..........................................................................................................................................  Patient/Patient Representative Signature      ..........................................................................................................................................  Patient Representative Print Name and Relationship to Patient    ..................................................               ................................................  Date                                            Time    ..........................................................................................................................................  Reviewed by Signature/Title    ...................................................              ..............................................  Date                                                            Time

## 2017-05-03 NOTE — ED NOTES
Bed: ED03  Expected date: 5/3/17  Expected time: 8:25 AM  Means of arrival: Ambulance  Comments:  BV2 84/M

## 2017-05-04 NOTE — PLAN OF CARE
Problem: Discharge Planning  Goal: Discharge Planning (Adult, OB, Behavioral, Peds)  Outcome: No Change  PRIMARY DIAGNOSIS: Mechanical Fall/Placement  Primary Symptoms: RLE pain, inability to walk     OUTPATIENT/OBSERVATION GOALS TO BE MET BEFORE DISCHARGE:     1. Orthostatic symptoms (BP decrease or HR increase with patient upright)?  No  2. Vital Signs stable? Yes  3. Documented urine output: No  4. Tolerating PO fluid: Yes  5. Symptoms improved: No  6. Labs WNL? Yes  7. ADLs back to baseline?  No  8. Activity and level of assistance: Slide or lift, patient is in too much pain to stand on leg.  9. Pain status: No improvement noted. Consider adjustment in pain regimen.  10. Barriers to discharge noted Yes, SW and PT for safe discharge.      Patient has no improvement in pain, reports 9/10. Patient is on baseline O2 of 3L NC. Patient had a brief nosebleed on shift, humidifier was applied to O2.

## 2017-05-04 NOTE — PROGRESS NOTES
[facility] - REFERRAL ONLY (SNF) Pending - Request Sent       [id number] [facility] [address]     Patient accepted please fax information/orders [phone]    Patient will be transported via [facility] to 5PM [facility] portable tank from ER.    Jumana Collier RN BSN CTS  [facility]   Care Management Coordinator  [email or web address]   [phone]

## 2017-05-04 NOTE — PROGRESS NOTES
05/04/17 1000   Quick Adds   Type of Visit Initial PT Evaluation   Living Environment   Lives With spouse   Living Arrangements house   Number of Stairs to Enter Home 2   Living Environment Comment pt states he just stays on main level   Self-Care   Usual Activity Tolerance fair   Current Activity Tolerance poor   Equipment Currently Used at Home walker, rolling;wheelchair   Activity/Exercise/Self-Care Comment pt has walker and w/c at home.  On 3 L O2 at baseline.  pt is on hospice for end state COPD and CHF   Functional Level Prior   Ambulation 1-->assistive equipment   Transferring 1-->assistive equipment   Toileting 1-->assistive equipment   Bathing 2-->assistive person   Dressing 2-->assistive person   Eating 0-->independent   Communication 0-->understands/communicates without difficulty   Swallowing 0-->swallows foods/liquids without difficulty   Cognition 0 - no cognition issues reported   Fall history within last six months yes   Number of times patient has fallen within last six months 2   Prior Functional Level Comment pt is [place], needs repetition. Pt has had at least 2 falls recently.    General Information   Onset of Illness/Injury or Date of Surgery - Date 05/03/17   Referring Physician АНДРЕЙ Palacios   Patient/Family Goals Statement none stated at this time   Pertinent History of Current Problem (include personal factors and/or comorbidities that impact the POC) Karan Vaz is a 84 year old male who presents with a mechanical fall while leaving the bathroom today. He fell onto his right side landing on his knee. His wife heard him fall and called 911. Now when he tries to bear weight he has pain in the right knee. He also broke his finger on his left hand but this isn't bothering him very much.    Precautions/Limitations fall precautions;oxygen therapy device and L/min   General Observations pt up in chair, agreeable to PT   Cognitive Status Examination   Orientation orientation to person, place  and time   Level of Consciousness alert   Follows Commands and Answers Questions 100% of the time   Personal Safety and Judgment intact   Memory intact   Pain Assessment   Patient Currently in Pain Yes, see Vital Sign flowsheet  (9/10 when standing)   Integumentary/Edema   Integumentary/Edema Comments ace wrap to R knee, splint to L finger   Posture    Posture Forward head position   Range of Motion (ROM)   ROM Comment pain with knee flex/ext B   Strength   Strength Comments pt able to demo LAQ, functional weakness evident with transfers   Bed Mobility   Bed Mobility Comments not tested   Transfer Skills   Transfer Comments sit to stand with mod A   Gait   Gait Comments pt walked 5 feet forward wtih WW and CGA/min A, great effort and increased pain on R WB'ing, pt unable to move back- needed chair brought behind him   Balance   Balance Comments UE support of walker   Sensory Examination   Sensory Perception Comments reports baseline tingling in fingers of both hands   Coordination   Coordination no deficits were identified   Muscle Tone   Muscle Tone no deficits were identified   General Therapy Interventions   Planned Therapy Interventions bed mobility training;gait training;transfer training;strengthening   Clinical Impression   Criteria for Skilled Therapeutic Intervention yes, treatment indicated   PT Diagnosis impaired mobility   Influenced by the following impairments pain, functional weakness   Functional limitations due to impairments impaired gait, decreased activity tolerance, high fall risk   Clinical Presentation Evolving/Changing   Clinical Presentation Rationale pt with new onset high levels of pain, hospice pt with CHF and COPD, not safe with mobility   Clinical Decision Making (Complexity) Moderate complexity   Therapy Frequency` 5 times/week   Predicted Duration of Therapy Intervention (days/wks) 5 days   Anticipated Discharge Disposition Transitional Care Facility;Home with Home Therapy   Risk &  "Benefits of therapy have been explained Yes   Patient, Family & other staff in agreement with plan of care Yes   [facility] TM \"6 Clicks\"   2016, Trustees of [facility], under license to [employer].  All rights reserved.   6 Clicks Short Forms Basic Mobility Inpatient Short Form   [facility] AM-PAC  \"6 Clicks\" V.2 Basic Mobility Inpatient Short Form   1. Turning from your back to your side while in a flat bed without using bedrails? 3 - A Little   2. Moving from lying on your back to sitting on the side of a flat bed without using bedrails? 2 - A Lot   3. Moving to and from a bed to a chair (including a wheelchair)? 2 - A Lot   4. Standing up from a chair using your arms (e.g., wheelchair, or bedside chair)? 2 - A Lot   5. To walk in hospital room? 3 - A Little   6. Climbing 3-5 steps with a railing? 2 - A Lot   Basic Mobility Raw Score (Score out of 24.Lower scores equate to lower levels of function) 14   Total Evaluation Time   Total Evaluation Time (Minutes) 16     "

## 2017-05-04 NOTE — PLAN OF CARE
Problem: Discharge Planning  Goal: Discharge Planning (Adult, OB, Behavioral, Peds)  Outcome: Improving  Problem: Discharge Planning  Goal: Discharge Planning (Adult, OB, Behavioral, Peds)  Outcome: No Change  PRIMARY DIAGNOSIS: Mechanical Fall/Placement  Primary Symptoms: RLE pain, inability to walk      OUTPATIENT/OBSERVATION GOALS TO BE MET BEFORE DISCHARGE:      1. Orthostatic symptoms (BP decrease or HR increase with patient upright)? No  2. Vital Signs stable? Yes  3. Documented urine output: No  4. Tolerating PO fluid: Yes  5. Symptoms improved: Yes  6. Labs WNL? Yes  7. ADLs back to baseline? No  8. Activity and level of assistance: Ax1  9. Pain status: 8/10 R knee pain, tylenol given  10. Barriers to discharge noted Yes, SW and PT for safe discharge.       Vitals stable. On baseline 3L O2. due to COPD. Pt alert and oriented x4, [place]. Ax1 for increased safety due to generalized weakness and R knee pain. Pt reported 8/10 R knee pain, pt wanted to try tylenol. Pt up in chair. Pt has been accepted to [facility]. Plan for [facility] transport at 5pm. Pt resting comfortably in chair visiting with wife. Will continue to monitor and provide supportive cares.

## 2017-05-04 NOTE — PLAN OF CARE
Problem: Goal Outcome Summary  Goal: Goal Outcome Summary     PT- Eval completed.  Pt lives in home with his spouse, is on hospice.  Pt uses walker and w/c for mobility in home.  Pt presents after fall with new R knee pain.  Pt needing mod A for sit to stand transfer, walked 5 feet forward with CGA/min A and could not go any further, could not step back towards chair so chair brought up behind patient. Pt rates R knee pain 9/10.   Based on current mobility, pt is not safe to return home with his wife.  Recommend TCU at this time.

## 2017-05-04 NOTE — CONSULTS
[facility]    Palliative Care Consultation without direct patient contact    This patient's H+P, most recent hospitalist note, medication profile and labs from the past 24 hours have been reviewed at the request of Clotilde Huerta PA-C  for Assist with methadone therapy.   Patient is a hospice patient who is admitted to the hospital after a fall for a non-hospice admission.  Plan to DC today to TCU and return to hospice care once therapy completed. He is on methadone for symptom management in hospice care.  Current dose is consistent with dose as outpatient and requested to continue without change in therapy for TCU until he returns to hospice.    [place] [facility] database review: Methadone 5mg tabs #30/30 days on 5/2/2017 per Angi Monterroso NP, [facility].      Recommendation: Continuation of therapy appears appropriate for this patient's symptom management within noted goals.  Will write prescription for facilitation of transfer to TCU  And eventual return to hospice care.  It has been determined that no change is necessary to the current plan of care at this time.   The chart will be reviewed regularly and the patient will be seen if necessary.   If you would like the patient to be seen, please contact the service at [phone] and ask to have the patient seen.  Time Spent 20 minutes, none in direct contact with patient or family.    Thank you!    Angi Francis   Pain Management and Palliative Care  [facility]  Pgr: [phone]

## 2017-05-04 NOTE — PROGRESS NOTES
[facility] - REFERRAL ONLY (SNF) Pending - Request Sent     [id number] [facility] [address] [address] [phone]                             The above discharge plan is pending for TCU for rehab to return home to hospice.  Patient has UCARE for senior-3 day Waiver will need to be made with PT consults and documentation from RN.      Jumana Collier RN BSN CTS  [facility]   Care Management Coordinator  [email or web address]   [phone]

## 2017-05-04 NOTE — PLAN OF CARE
"Problem: Discharge Planning  Goal: Discharge Planning (Adult, OB, Behavioral, Peds)  Outcome: Improving  Problem: Discharge Planning  Goal: Discharge Planning (Adult, OB, Behavioral, Peds)  Outcome: No Change  PRIMARY DIAGNOSIS: Mechanical Fall/Placement  Primary Symptoms: RLE pain, inability to walk      OUTPATIENT/OBSERVATION GOALS TO BE MET BEFORE DISCHARGE:      1. Orthostatic symptoms (BP decrease or HR increase with patient upright)? No  2. Vital Signs stable? Yes  3. Documented urine output: No  4. Tolerating PO fluid: Yes  5. Symptoms improved: Yes, reports R knee hurts \"a little bit\"  6. Labs WNL? Yes  7. ADLs back to baseline? No  8. Activity and level of assistance: Ax1  9. Pain status: reports being comfortable, declines need for pain intervention  10. Barriers to discharge noted Yes, SW and PT for safe discharge.       Vitals stable. On baseline 3L O2. due to COPD. Pt alert and oriented x4, [place]. Ax1 for increased safety due to generalized weakness and R knee pain. Pt reports R knee hurts \"a little\" but declines need for pain intervention. Pt up in chair, resting comfortably. Will continue to monitor and provide supportive cares.         "

## 2017-05-04 NOTE — DISCHARGE SUMMARY
Discharge Summary  Hospitalist Service      Karan Vaz MRN# [id number]   YOB: 1932 Age: 84 year old     Date of Admission:  5/3/2017  Date of Discharge:  5/4/2017  Admitting Physician:  Fred Britt MD  Discharge Physician: Clotilde Huerta PA-C   Discharging Service: Hospitalist Service     Primary Provider: Nidia Mitchell  Primary Care Physician Phone Number: [phone]         Discharge Diagnoses/Problem Oriented Hospital Course (Providers):    Karan Vaz was admitted on 5/3/2017 by Fred Britt MD and I would refer you to their history and physical.  The following problems were addressed during his hospitalization:    1. Right knee pain- Patient had fall resulting in right knee pain. Based on X ray imaging no fractures were seen. We were able to control his pain with oxycodone and Tylenol but unfortunately he was unable to ambulate well enough to go home. PT recommended TCU and he was sent there in hopes to return home to Hospice. Discharged with prescriptions for methadone and Oxycodone.     2. End stage COPD- Resumed all home meds    3. CHF- Resumed home meds    4. Previous hospice patient- [facility] patient who will be in contact with patient regarding further hospice needs.           Code Status:      DNR / DNI              Pending Results:        Unresulted Labs Ordered in the Past 30 Days of this Admission     No orders found for last 61 day(s).               Discharge Instructions and Follow-Up:      Follow-up Appointments     Follow Up and recommended labs and tests       Follow up with Nursing home physician.  No follow up labs or test are   needed.                         Discharge Disposition:      Discharged to rehabilitation facility          Discharge Medications:        Current Discharge Medication List      START taking these medications    Details   oxyCODONE (ROXICODONE) 5 MG IR tablet Take 0.5-1 tablets (2.5-5 mg) by mouth  every 3 hours as needed for moderate to severe pain  Qty: 20 tablet, Refills: 0    Associated Diagnoses: Acute pain of right knee      hydrOXYzine (ATARAX) 25 MG tablet Take 1 tablet (25 mg) by mouth every 6 hours as needed for other (adjuvant pain)  Qty: 20 tablet, Refills: 0    Associated Diagnoses: Acute pain of right knee         CONTINUE these medications which have CHANGED    Details   methadone (DOLOPHINE) 5 MG tablet Take 0.5 tablets (2.5 mg) by mouth every 12 hours  Qty: 10 tablet, Refills: 0    Associated Diagnoses: Pain of finger of left hand         CONTINUE these medications which have NOT CHANGED    Details   !! ipratropium - albuterol 0.5 mg/2.5 mg/3 mL (DUONEB) 0.5-2.5 (3) MG/3ML neb solution Take 1 vial by nebulization 4 times daily      predniSONE (DELTASONE) 10 MG tablet Take 10 mg by mouth daily      sennosides (SENOKOT) 8.6 MG tablet Take 2 tablets by mouth 2 times daily      DULoxetine (CYMBALTA) 60 MG EC capsule TAKE ONE CAPSULE BY MOUTH ONE TIME DAILY   Qty: 90 capsule, Refills: 0    Associated Diagnoses: Depression, major, recurrent, moderate (H)      gabapentin (NEURONTIN) 300 MG capsule TAKE TWO CAPSULES BY MOUTH TWICE DAILY   Qty: 120 capsule, Refills: 0    Associated Diagnoses: Gastroesophageal reflux disease without esophagitis      tamsulosin (FLOMAX) 0.4 MG capsule Take 1 capsule (0.4 mg) by mouth daily  Qty: 90 capsule, Refills: 0    Associated Diagnoses: Hypertrophy of prostate with urinary retention      !! traMADol (ULTRAM) 50 MG tablet Take 1 tablet (50 mg) by mouth 3 times daily as needed for moderate pain  Qty: 90 tablet, Refills: 0    Associated Diagnoses: Chronic TMJ pain      senna-docusate (SENOKOT-S;PERICOLACE) 8.6-50 MG per tablet Take 2 tablets by mouth 2 times daily  Qty: 100 tablet, Refills: 3    Associated Diagnoses: Constipation, unspecified constipation type      metoprolol (LOPRESSOR) 25 MG tablet TAKE HALF TABLET BY MOUTH TWICE DAILY   Qty: 90 tablet, Refills: 0     Associated Diagnoses: Essential hypertension      albuterol (2.5 MG/3ML) 0.083% neb solution INHALE 1 VIAL BY NEBULIZATION EVERY 2 HOURS AS NEEDED FOR DYSPNEA  Qty: 540 mL, Refills: 0    Associated Diagnoses: Moderate persistent asthma without complication      acetaminophen (MAPAP) 500 MG tablet Take 2 tablets (1,000 mg) by mouth 2 times daily  Qty: 240 tablet, Refills: 0    Associated Diagnoses: Other chronic pain      omeprazole (PRILOSEC) 20 MG CR capsule TAKE ONE CAPSULE BY MOUTH ONE TIME DAILY   Qty: 30 capsule, Refills: 0    Associated Diagnoses: Gastroesophageal reflux disease without esophagitis      torsemide (DEMADEX) 10 MG tablet Take 1 tablet (10 mg) by mouth daily  Qty: 30 tablet, Refills: 1    Associated Diagnoses: Obstructive chronic bronchitis with exacerbation (H)      budesonide (PULMICORT) 0.5 MG/2ML neb solution Take 2 mLs (0.5 mg) by nebulization 2 times daily  Qty: 60 mL, Refills: 3    Associated Diagnoses: Chronic obstructive pulmonary disease, unspecified COPD type (H)      buPROPion (WELLBUTRIN XL) 150 MG 24 hr tablet TAKE ONE TABLET BY MOUTH EVERY NIGHT AT BEDTIME  Qty: 90 tablet, Refills: 0    Associated Diagnoses: Depression, major, recurrent, moderate (H)      montelukast (SINGULAIR) 10 MG tablet TAKE ONE TABLET BY MOUTH EVERY NIGHT AT BEDTIME  Qty: 90 tablet, Refills: 0    Associated Diagnoses: Allergic rhinitis due to pollen, unspecified rhinitis seasonality      albuterol (PROAIR HFA, PROVENTIL HFA, VENTOLIN HFA) 108 (90 BASE) MCG/ACT inhaler Inhale 2 puffs into the lungs every 6 hours as needed for shortness of breath / dyspnea  Qty: 1 Inhaler, Refills: 6    Associated Diagnoses: Mild intermittent asthma without complication      Simethicone 180 MG CAPS Take 1 capsule by mouth 2 times daily  Qty: 60 capsule      order for DME Equipment being ordered: Oxygen Titration visit [facility]  Fax # [phone]  Refills: 0    Associated Diagnoses: COPD exacerbation (H); Acute on  chronic respiratory failure (H); Acute on chronic diastolic congestive heart failure (H)      !! traMADol (ULTRAM) 50 MG tablet Take 25 mg by mouth nightly as needed for moderate pain      hypromellose-dextran 0.3-0.1% (ARTIFICIAL TEARS) opthalmic solution Place 1 drop into both eyes every hour as needed      atropine 1 % ophthalmic solution Take 2-4 drops by mouth every 2 hours as needed for secretions      bisacodyl (DULCOLAX) 10 MG Suppository Place 10 mg rectally 2 times daily as needed for constipation      !! ipratropium - albuterol 0.5 mg/2.5 mg/3 mL (DUONEB) 0.5-2.5 (3) MG/3ML neb solution Take 1 vial by nebulization every 2 hours as needed for shortness of breath / dyspnea or wheezing      polyethylene glycol (MIRALAX/GLYCOLAX) Packet Take 17 g by mouth daily as needed for constipation      lidocaine (XYLOCAINE) 5 % ointment Apply topically 3 times daily as needed for moderate pain  Qty: 50 g, Refills: 1    Associated Diagnoses: Herpes zoster without complication       !! - Potential duplicate medications found. Please discuss with provider.      STOP taking these medications       haloperidol (HALDOL) 0.5 MG tablet Comments:   Reason for Stopping:         LORazepam (ATIVAN) 0.5 MG tablet Comments:   Reason for Stopping:         morphine sulfate HIGH CONCENTRATE (ROXANOL *CONCENTRATED*) 20 mg/mL (HIGH CONC) solution Comments:   Reason for Stopping:                    Allergies:         Allergies   Allergen Reactions     No Known Allergies               Condition and Physical Exam on Discharge:      Discharge condition: Stable   Discharge vitals: Blood pressure 124/48, pulse 73, temperature 98.9  F (37.2  C), temperature source Oral, resp. rate 18, SpO2 95 %.     Constitutional: Alert and orientated.    Lungs: Some scattered rhonchi noted.   Cardiovascular: RRR with no murmur.   Abdomen: Bowel sounds are present with no tenderness   Skin: No rashes or open sores   Other:            Discharge Orders for  Skilled Facility (from Discharge Orders):        After Care Instructions     Activity - Up with nursing assistance           Advance Diet as Tolerated       Follow this diet upon discharge: Regular            Fall precautions           General info for SNF       Length of Stay Estimate: Short Term Care: Estimated # of Days <30  Condition at Discharge: Stable  Level of care:skilled   Rehabilitation Potential: Excellent  Admission H&P remains valid and up-to-date: Yes  Recent Chemotherapy: N/A  Use Nursing Home Standing Orders: Yes            Hip precautions           Mantoux instructions       Give two-step Mantoux (PPD) Per Facility Policy Yes            Oxygen - Nasal cannula       3 Lpm by nasal cannula to keep O2 sats 92% or greater.                           Rehab orders for Skilled Facility (from Discharge Orders):      Referrals     Future Labs/Procedures    Occupational Therapy Adult Consult     Comments:    Evaluate and treat as clinically indicated.    Reason:  Weakness and right knee pain    Physical Therapy Adult Consult     Comments:    Evaluate and treat as clinically indicated.    Reason:  Weakness and right knee pain             Discharge Time:      Less than 30 minutes.        Image Results From This Hospital Stay (For Non-EPIC Providers):        Results for orders placed or performed during the hospital encounter of 05/03/17   CT Head w/o Contrast    Narrative    CT HEAD WITHOUT CONTRAST  5/3/2017 10:19 AM    HISTORY: Headache after fall.    TECHNIQUE: Scans were obtained through the head without IV contrast.  Radiation dose for this scan was reduced using automated exposure  control, adjustment of the mA and/or kV according to patient size, or  iterative reconstruction technique.    COMPARISON: 10/11/2016.    FINDINGS: Moderate atrophy. Low-density change in the white matter of  both hemispheres consistent with chronic small vessel ischemic  disease. No hemorrhage, mass lesion, or focal area of  acute infarction  identified. Paranasal sinuses are normal. No bony abnormality. No  interval change.      Impression    IMPRESSION:   1. Atrophy and chronic white matter disease.  2. Nothing acute and no interval change.    KARISHMA COKER MD   CT Cervical Spine w/o Contrast    Narrative    CT CERVICAL SPINE WITHOUT CONTRAST  5/3/2017 10:22 AM    HISTORY:  Neck pain after trauma.    COMPARISON: None.    TECHNIQUE: Routine CT cervical spine to T1. Multiplanar  reconstruction. Radiation dose for this scan was reduced using  automated exposure control, adjustment of the mA and/or kV according  to patient size, or iterative reconstruction technique.    FINDINGS: Slight anterior subluxation of C4 on C5 due to facet joint  disease. Otherwise normal alignment through T1. No fracture. No  acute-appearing abnormality. Degenerative changes as follows:    C2-C3: Advanced bilateral facet joint disease.    C3-C4: Moderate ventral ridging. Advanced facet joint disease, left  more than right. Moderate left-sided foraminal stenosis.    C4-C5: Anterior subluxation of C4 on C5 due to advanced facet joint  disease which is worse on the left. Moderate left-sided foraminal  stenosis.    C5-C6: Moderate narrowing of the interspace. Mild ventral ridging.  Moderate foraminal stenosis on the right.    C6-C7: Mild ventral ridge. No significant central or lateral stenosis.    C7-T1: Negative.      Impression    IMPRESSION:  1. Multilevel degenerative changes described.  2. No fracture or acute abnormality.    KARISHMA COKER MD   XR Hand Left G/E 3 Views    Narrative    XR HAND LT G/E 3 VW 5/3/2017 10:45 AM    COMPARISON: None.    HISTORY: Pain after fall, concern for fracture.      Impression    IMPRESSION: Possible minimally displaced avulsion type fracture at the  left second proximal interphalangeal joint. Osteopenia is noted about  the left hand. No other findings concerning for fracture.    LIANG WARD   Pelvis XR, 1-2 views     Narrative    XR FEMUR RT 2 VW, XR PELVIS 1/2 VW 5/3/2017 10:48 AM    COMPARISON: None.    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  Hardware appears intact. Left arthroplasty is not completely  visualized. No fractures are seen.    LIANG WARD   Femur XR, 2 views, right    Narrative    XR FEMUR RT 2 VW, XR PELVIS 1/2 VW 5/3/2017 10:48 AM    COMPARISON: None.    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  Hardware appears intact. Left arthroplasty is not completely  visualized. No fractures are seen.    LIANG WARD   Foot  XR, G/E 3 views, right    Narrative    XR FOOT RT G/E 3 VW 5/3/2017 10:46 AM    COMPARISON: None.    HISTORY: Laceration at the base of the fifth toe, concern for foreign  body or fracture.      Impression    IMPRESSION: No displaced fractures are seen in the right foot. No  radiodense foreign bodies.    LIANG WARD   XR Knee Right 3 Views    Narrative    KNEE RIGHT THREE VIEWS  5/3/2017 5:02 PM     HISTORY: Right knee pain after fall    COMPARISON: None.      Impression    IMPRESSION: No acute bony abnormalities. Mild medial joint space  narrowing. Vascular calcifications. No joint space effusion.    CHADWICK JACK MD     *Note: Due to a large number of results and/or encounters for the requested time period, some results have not been displayed. A complete set of results can be found in Results Review.           Most Recent Lab Results In EPIC (For Non-EPIC Providers):    Most Recent 3 CBC's:  Recent Labs   Lab Test  05/03/17   0915  10/11/16   1620  10/11/16   1141   WBC  8.7  9.3  10.0   HGB  11.8*  12.6*  12.4*   MCV  94  94  95.3   PLT  291  332  345      Most Recent 3 BMP's:  Recent Labs   Lab Test  05/03/17   0915  03/06/17   1255  10/11/16   1620   NA  138  139  137   POTASSIUM  4.2  4.2  4.6   CHLORIDE  97  99  101   CO2  34*  33*  31   BUN   25  20  21   CR  0.85  0.82  0.94   ANIONGAP  7  7  5   GITA  9.3  8.8  9.1   GLC  124*  129*  119*     Most Recent 3 Troponin's:No lab results found.  Most Recent 3 INR's:  Recent Labs   Lab Test  05/03/17   0915  02/18/16 2035 11/30/09   1035   INR  0.88  0.96  1.05     Most Recent 2 LFT's:  Recent Labs   Lab Test  05/03/17   0915  10/11/16   1620   AST  27  33   ALT  23  23   ALKPHOS  88  107   BILITOTAL  0.3  0.4     Most Recent Cholesterol Panel:  Recent Labs   Lab Test  06/23/11   0500   CHOL  212*   LDL  132*   HDL  41   TRIG  195*     Most Recent 6 Bacteria Isolates From Any Culture (See EPIC Reports for Culture Details):  Recent Labs   Lab Test  04/01/16   2250  04/01/16   2202  02/20/16   1600  02/18/16   2050  02/18/16 2035 04/06/13   1732   CULT  Cultured on the 4th day of incubation: Propionibacterium acnes  Critical Value/Significant Value, preliminary result only, called to and read   back by Carline Alves RN at 2037 on 4.5.16. KD  *  No growth  Normal diya  Light growth Citrobacter koseri  Moderate growth Candida glabrata Susceptibility testing not routinely done  Light growth Candida albicans Candida albicans and Candida dubliniensis are not   routinely speciated Susceptibility testing not routinely done  *  No growth  No growth  No growth     Most Recent TSH, T4 and HgbA1c:  Recent Labs   Lab Test  02/19/16   0630  05/14/14   1345   TSH   --   1.15   A1C  6.3*   --

## 2017-05-04 NOTE — PLAN OF CARE
Problem: Discharge Planning  Goal: Discharge Planning (Adult, OB, Behavioral, Peds)  Outcome: Adequate for Discharge Date Met:  05/04/17  Pt ready for discharge. Packet given to transport.      OBSERVATION patient END time: [id number]

## 2017-05-04 NOTE — PLAN OF CARE
Problem: Discharge Planning  Goal: Discharge Planning (Adult, OB, Behavioral, Peds)  Outcome: Improving  PRIMARY DIAGNOSIS: Mechanical Fall/Placement  Primary Symptoms: RLE pain, inability to walk      OUTPATIENT/OBSERVATION GOALS TO BE MET BEFORE DISCHARGE:      1. Orthostatic symptoms (BP decrease or HR increase with patient upright)? No  2. Vital Signs stable? Yes  3. Documented urine output: No  4. Tolerating PO fluid: Yes  5. Symptoms improved: No  6. Labs WNL? Yes  7. ADLs back to baseline? No  8. Activity and level of assistance: heavy assist with two staff.  9. Pain status: No improvement noted. Consider adjustment in pain regimen.  10. Barriers to discharge noted Yes, SW and PT for safe discharge.       Patient has no improvement in pain, reports pain is less.  Patient is on baseline O2 of 3L NC. Patient had a brief nosebleed on shift, humidifier was applied to O2.

## 2017-05-04 NOTE — PLAN OF CARE
"Problem: Discharge Planning  Goal: Discharge Planning (Adult, OB, Behavioral, Peds)  Outcome: Improving  Problem: Discharge Planning  Goal: Discharge Planning (Adult, OB, Behavioral, Peds)  Outcome: No Change  PRIMARY DIAGNOSIS: Mechanical Fall/Placement  Primary Symptoms: RLE pain, inability to walk      OUTPATIENT/OBSERVATION GOALS TO BE MET BEFORE DISCHARGE:      1. Orthostatic symptoms (BP decrease or HR increase with patient upright)? No  2. Vital Signs stable? Yes  3. Documented urine output: No  4. Tolerating PO fluid: Yes  5. Symptoms improved: Yes, reports R knee hurts \"a little bit\"  6. Labs WNL? Yes  7. ADLs back to baseline? No  8. Activity and level of assistance: Ax1  9. Pain status: reports being comfortable, declines need for pain intervention  10. Barriers to discharge noted Yes, SW and PT for safe discharge.       Vitals stable. On baseline 3L O2. due to COPD. Pt alert and oriented x4, [place]. Ax1 for increased safety due to generalized weakness and R knee pain. Declines need for pain intervention. Pt up in chair. Plan for PT and SW consults today. Will continue to monitor and provide supportive cares.             "

## 2017-05-04 NOTE — PLAN OF CARE
Problem: Discharge Planning  Goal: Discharge Planning (Adult, OB, Behavioral, Peds)  Outcome: No Change  PRIMARY DIAGNOSIS: Mechanical Fall/Placement  Primary Symptoms: RLE pain, inability to walk      OUTPATIENT/OBSERVATION GOALS TO BE MET BEFORE DISCHARGE:      1. Orthostatic symptoms (BP decrease or HR increase with patient upright)? No  2. Vital Signs stable? Yes  3. Documented urine output: No  4. Tolerating PO fluid: Yes  5. Symptoms improved: No  6. Labs WNL? Yes  7. ADLs back to baseline? No  8. Activity and level of assistance: heavy assist with two staff.  9. Pain status: No improvement noted. Consider adjustment in pain regimen.  10. Barriers to discharge noted Yes, SW and PT for safe discharge.       Patient has no improvement in pain, reports pain is less. Has had frequent voiding during the night.  Patient is on baseline O2 of 3L NC with bubbler on.

## 2017-05-05 NOTE — PLAN OF CARE
Problem: Goal Outcome Summary  Goal: Goal Outcome Summary  Physical Therapy Discharge Summary     Reason for therapy discharge:    Discharged to transitional care facility.     Progress towards therapy goal(s). See goals on Care Plan in [facility] electronic health record for goal details.  Goals not met.  Barriers to achieving goals:   discharge on same date as initial evaluation.     Therapy recommendation(s):    Continued therapy is recommended.  Rationale/Recommendations:  .. Pt is below baseline for functional mobility and strength. Pt would benefit from continued skilled therapy to address these deficits.

## 2017-05-05 NOTE — MR AVS SNAPSHOT
After Visit Summary   5/5/2017    Karan Vaz    MRN: [id number]           Patient Information     Date Of Birth          10/7/1932        Visit Information        Provider Department      5/5/2017 9:00 AM Triny Mccall APRN CNP [facility]        Today's Diagnoses     Physical deconditioning    -  1    Toe laceration, subsequent encounter        Fall, subsequent encounter        Closed head injury, subsequent encounter        Closed displaced fracture of middle phalanx of left index finger, sequela        Generalized weakness        COPD exacerbation (H)        Palliative care patient        Drug-induced constipation        Generalized pain           Follow-ups after your visit        Your next 10 appointments already scheduled     May 12, 2017  9:45 AM CDT   Return Visit with EMI Orta CNP   [facility] ([facility])    [address]  [address]  [facility] [id number] [phone]            Jun 09, 2017  9:45 AM CDT   Return Visit with EMI Orta CNP   [facility] ([facility]    [address]  [address]  [facility] [id number] [phone]              Who to contact     If you have questions or need follow up information about today's clinic visit or your schedule please contact GERIATRICS TRANSITIONAL CARE directly at [phone].  Normal or non-critical lab and imaging results will be communicated to you by MyChart, letter or phone within 4 business days after the clinic has received the results. If you do not hear from us within 7 days, please contact the clinic through MyChart or phone. If you have a critical or abnormal lab result, we will notify you by phone as soon as possible.  Submit refill requests through [employer] or call your pharmacy and they will forward the refill request to us. Please allow 3 business days for your refill to be  "completed.          Additional Information About Your Visit        Waterfall Information     Waterfall lets you send messages to your doctor, view your test results, renew your prescriptions, schedule appointments and more. To sign up, go to [email or web address] . Click on \"Log in\" on the left side of the screen, which will take you to the Welcome page. Then click on \"Sign up Now\" on the right side of the page.     You will be asked to enter the access code listed below, as well as some personal information. Please follow the directions to create your username and password.     Your access code is: [email or web address]  Expires: 2017 12:02 AM     Your access code will  in 90 days. If you need help or a new code, please call your [facility] clinic or [phone].        Care EveryWhere ID     This is your Care EveryWhere ID. This could be used by other organizations to access your [facility] medical records  [id number]        Your Vitals Were     Pulse Temperature Respirations Height Pulse Oximetry       86 98.3  F (36.8  C) 20 5' 5\" (1.651 m) 99%        Blood Pressure from Last 3 Encounters:   17 142/74   17 131/63   17 122/65    Weight from Last 3 Encounters:   10/11/16 225 lb (102.1 kg)   10/11/16 226 lb (102.5 kg)   16 204 lb 12.8 oz (92.9 kg)              Today, you had the following     No orders found for display         Today's Medication Changes          These changes are accurate as of: 17 12:08 PM.  If you have any questions, ask your nurse or doctor.               These medicines have changed or have updated prescriptions.        Dose/Directions    MAPAP 500 MG tablet   This may have changed:  Another medication with the same name was removed. Continue taking this medication, and follow the directions you see here.   Used for:  Other chronic pain   Generic drug:  acetaminophen   Changed by:  Sindi Wilson APRN CNP        Dose:  1000 mg   Take 2 tablets (1,000 mg) " by mouth 2 times daily   Quantity:  240 tablet   Refills:  0         Stop taking these medicines if you haven't already. Please contact your care team if you have questions.     sennosides 8.6 MG tablet   Commonly known as:  SENOKOT   Stopped by:  Triny Mccall APRN CNP                    Primary Care Provider Office Phone # Fax #    Nidia Ruiz EMI Mitchell [phone]       [facility] [address] [place] [address] [address] [place] [address]        Thank you!     Thank you for choosing G[facility]AT[facility]  for your care. Our goal is always to provide you with excellent care. Hearing back from our patients is one way we can continue to improve our services. Please take a few minutes to complete the written survey that you may receive in the mail after your visit with us. Thank you!             Your Updated Medication List - Protect others around you: Learn how to safely use, store and throw away your medicines at www.disposemymeds.org.          This list is accurate as of: 5/5/17 12:08 PM.  Always use your most recent med list.                   Brand Name Dispense Instructions for use    * albuterol 108 (90 BASE) MCG/ACT Inhaler    PROAIR HFA/PROVENTIL HFA/VENTOLIN HFA    1 Inhaler    Inhale 2 puffs into the lungs every 6 hours as needed for shortness of breath / dyspnea       * albuterol (2.5 MG/3ML) 0.083% neb solution     540 mL    INHALE 1 VIAL BY NEBULIZATION EVERY 2 HOURS AS NEEDED FOR DYSPNEA       atropine 1 % ophthalmic solution      Take 2-4 drops by mouth every 2 hours as needed for secretions       bisacodyl 10 MG Suppository    DULCOLAX     Place 10 mg rectally 2 times daily as needed for constipation       budesonide 0.5 MG/2ML neb solution    PULMICORT    60 mL    Take 2 mLs (0.5 mg) by nebulization 2 times daily       buPROPion 150 MG 24 hr tablet    WELLBUTRIN XL    90 tablet    TAKE ONE TABLET BY MOUTH EVERY NIGHT AT BEDTIME       DULoxetine 60 MG EC  capsule    CYMBALTA    90 capsule    TAKE ONE CAPSULE BY MOUTH ONE TIME DAILY       gabapentin 300 MG capsule    NEURONTIN    120 capsule    TAKE TWO CAPSULES BY MOUTH TWICE DAILY       hydrOXYzine 25 MG tablet    ATARAX    20 tablet    Take 1 tablet (25 mg) by mouth every 6 hours as needed for other (adjuvant pain)       hypromellose-dextran 0.3-0.1% opthalmic solution      Place 1 drop into both eyes every hour as needed       * ipratropium - albuterol 0.5 mg/2.5 mg/3 mL 0.5-2.5 (3) MG/3ML neb solution    DUONEB     Take 1 vial by nebulization 4 times daily       * ipratropium - albuterol 0.5 mg/2.5 mg/3 mL 0.5-2.5 (3) MG/3ML neb solution    DUONEB     Take 1 vial by nebulization every 2 hours as needed for shortness of breath / dyspnea or wheezing       lidocaine 5 % ointment    XYLOCAINE    50 g    Apply topically 3 times daily as needed for moderate pain       MAPAP 500 MG tablet   Generic drug:  acetaminophen     240 tablet    Take 2 tablets (1,000 mg) by mouth 2 times daily       methadone 5 MG tablet    DOLOPHINE    10 tablet    Take 0.5 tablets (2.5 mg) by mouth every 12 hours       metoprolol 25 MG tablet    LOPRESSOR    90 tablet    TAKE HALF TABLET BY MOUTH TWICE DAILY       montelukast 10 MG tablet    SINGULAIR    90 tablet    TAKE ONE TABLET BY MOUTH EVERY NIGHT AT BEDTIME       omeprazole 20 MG CR capsule    priLOSEC    30 capsule    TAKE ONE CAPSULE BY MOUTH ONE TIME DAILY       order for DME      Equipment being ordered: Oxygen Titration visit [facility] Fax # [phone]       oxyCODONE 5 MG IR tablet    ROXICODONE    20 tablet    Take 0.5-1 tablets (2.5-5 mg) by mouth every 3 hours as needed for moderate to severe pain       polyethylene glycol Packet    MIRALAX/GLYCOLAX     Take 17 g by mouth daily as needed for constipation       predniSONE 10 MG tablet    DELTASONE     Take 10 mg by mouth daily       senna-docusate 8.6-50 MG per tablet    SENOKOT-S;PERICOLACE    100 tablet    Take 4  tablets by mouth 2 times daily       Simethicone 180 MG Caps     60 capsule    Take 1 capsule by mouth 2 times daily       tamsulosin 0.4 MG capsule    FLOMAX    90 capsule    Take 1 capsule (0.4 mg) by mouth daily       torsemide 10 MG tablet    DEMADEX    30 tablet    Take 1 tablet (10 mg) by mouth daily       * TRAMADOL HCL PO      Take 25 mg by mouth nightly as needed for moderate to severe pain       * TRAMADOL HCL PO      Take 50 mg by mouth 3 times daily as needed for moderate to severe pain       * Notice:  This list has 6 medication(s) that are the same as other medications prescribed for you. Read the directions carefully, and ask your doctor or other care provider to review them with you.

## 2017-05-05 NOTE — PROGRESS NOTES
[facility]  PRIMARY CARE PROVIDER AND CLINIC:  Nidia Mitchell  CLINICS [facility] [address] [place] [address] / [place]*  Chief Complaint   Patient presents with     Hospital F/U       HPI:    Karan Vaz is a 84 year old  (10/7/1932),admitted to the [facility] from [facility].  Hospital stay 5/3/17 through 5/4/17.  Admitted to this facility for  rehab, medical management and nursing care. 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 Unfortunately, he is unable to ambulate in the ED and they have requested a 'non hospice related' admission for inability to walk and placement.    1. Right knee pain after mechanical fall  Patient is unable to bear weight because of right knee pain after a mechanical fall today. Imaging was done of his right foot, femur and pelvis but pain is specifically in his knee.  -Pain control with tylenol, low dose oxycodone and atarax. Hx of GI bleed so can not take ibuprofen.  2. Fall with left 2nd finger fracture  Mechanical fall with fracture of left 2nd finger.   -Pain control as needed.  3. Hospice patient  --BPs: 142-158/74-76 mmHg and Weights: None taken since admission  --pain managed with current meds.   --therapies for strengthening    COPD exacerbation (H)  Generalized pain  Palliative care patient  On oxygen, feels breathing is always impaired. Continues pain meds, prednisone.     Drug-induced constipation  Feels GI discomfort and distension, no stool x 3 days.      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 He also has no past medical history of Chronic infection or PONV (postoperative nausea and vomiting).  PAST SURGICAL HISTORY:  has a past surgical history that includes SHOULDER SURG PROC UNLISTED; SHOULDER SURG PROC UNLISTED; SHOULDER SURG PROC UNLISTED; REVISE MEDIAN N/CARPAL TUNNEL SURG (7/01/02); REVISE MEDIAN N/CARPAL TUNNEL SURG (09/24/02); Arthroplasty hip bilateral; Arthroscopy shoulder distal clavicle repair (5/30/2012); ost. microwave thermotx (2012); and Sigmoidoscopy flexible (N/A, 2/16/2016).  FAMILY HISTORY: family history is negative for Colon Cancer.  SOCIAL HISTORY:  reports that he quit smoking about 27 years ago. He has never used smokeless tobacco. He reports that he does not drink alcohol or use illicit drugs.    Post Discharge Medication Reconciliation Status: discharge medications reconciled and changed, per note/orders (see AVS).  Current Outpatient Prescriptions   Medication Sig Dispense Refill     TRAMADOL HCL PO Take 25 mg by mouth nightly as needed for moderate to severe pain       TRAMADOL HCL PO Take 50 mg by mouth 3 times daily as needed for moderate to severe pain       methadone (DOLOPHINE) 5 MG tablet Take 0.5 tablets (2.5 mg) by mouth every 12 hours 10 tablet 0     oxyCODONE (ROXICODONE) 5 MG IR tablet Take 0.5-1 tablets (2.5-5 mg) by mouth every 3 hours as needed for moderate to severe pain 20 tablet 0     hydrOXYzine (ATARAX) 25 MG tablet Take 1 tablet (25 mg) by mouth every 6 hours as needed for other (adjuvant pain) 20 tablet  0     hypromellose-dextran 0.3-0.1% (ARTIFICIAL TEARS) opthalmic solution Place 1 drop into both eyes every hour as needed       atropine 1 % ophthalmic solution Take 2-4 drops by mouth every 2 hours as needed for secretions       bisacodyl (DULCOLAX) 10 MG Suppository Place 10 mg rectally 2 times daily as needed for constipation       ipratropium - albuterol 0.5 mg/2.5 mg/3 mL (DUONEB) 0.5-2.5 (3) MG/3ML neb solution Take 1 vial by nebulization 4 times daily       ipratropium - albuterol 0.5 mg/2.5 mg/3 mL (DUONEB) 0.5-2.5 (3) MG/3ML neb solution Take 1 vial by nebulization every 2 hours as needed for shortness of breath / dyspnea or wheezing       polyethylene glycol (MIRALAX/GLYCOLAX) Packet Take 17 g by mouth daily as needed for constipation       predniSONE (DELTASONE) 10 MG tablet Take 10 mg by mouth daily       DULoxetine (CYMBALTA) 60 MG EC capsule TAKE ONE CAPSULE BY MOUTH ONE TIME DAILY  90 capsule 0     gabapentin (NEURONTIN) 300 MG capsule TAKE TWO CAPSULES BY MOUTH TWICE DAILY  120 capsule 0     tamsulosin (FLOMAX) 0.4 MG capsule Take 1 capsule (0.4 mg) by mouth daily 90 capsule 0     senna-docusate (SENOKOT-S;PERICOLACE) 8.6-50 MG per tablet Take 4 tablets by mouth 2 times daily  100 tablet 3     metoprolol (LOPRESSOR) 25 MG tablet TAKE HALF TABLET BY MOUTH TWICE DAILY  90 tablet 0     albuterol (2.5 MG/3ML) 0.083% neb solution INHALE 1 VIAL BY NEBULIZATION EVERY 2 HOURS AS NEEDED FOR DYSPNEA 540 mL 0     acetaminophen (MAPAP) 500 MG tablet Take 2 tablets (1,000 mg) by mouth 2 times daily 240 tablet 0     lidocaine (XYLOCAINE) 5 % ointment Apply topically 3 times daily as needed for moderate pain 50 g 1     omeprazole (PRILOSEC) 20 MG CR capsule TAKE ONE CAPSULE BY MOUTH ONE TIME DAILY  30 capsule 0     torsemide (DEMADEX) 10 MG tablet Take 1 tablet (10 mg) by mouth daily 30 tablet 1     budesonide (PULMICORT) 0.5 MG/2ML neb solution Take 2 mLs (0.5 mg) by nebulization 2 times daily 60 mL 3      "buPROPion (WELLBUTRIN XL) 150 MG 24 hr tablet TAKE ONE TABLET BY MOUTH EVERY NIGHT AT BEDTIME 90 tablet 0     montelukast (SINGULAIR) 10 MG tablet TAKE ONE TABLET BY MOUTH EVERY NIGHT AT BEDTIME 90 tablet 0     albuterol (PROAIR HFA, PROVENTIL HFA, VENTOLIN HFA) 108 (90 BASE) MCG/ACT inhaler Inhale 2 puffs into the lungs every 6 hours as needed for shortness of breath / dyspnea 1 Inhaler 6     Simethicone 180 MG CAPS Take 1 capsule by mouth 2 times daily 60 capsule      order for DME Equipment being ordered: Oxygen Titration visit [facility]  Fax # [phone]  0       ROS:  10 point ROS of systems including Constitutional, Eyes, Respiratory, Cardiovascular, Gastroenterology, Genitourinary, Integumentary, Musculoskeletal, Psychiatric were all negative except for pertinent positives noted in my HPI.    Exam:  /74  Pulse 86  Temp 98.3  F (36.8  C)  Resp 20  Ht 5' 5\" (1.651 m)  SpO2 99%  GENERAL APPEARANCE:  Alert, in no distress, pleasant, cooperative, oriented x self, place and recent events.   EYES:  EOM, lids, pupils and irises normal, sclera clear and conjunctiva normal, no discharge or mattering on lids or lashes noted  ENT:  Mouth normal, moist mucous membranes, nose normal without drainage or crusting, external ears without lesions, hearing acuity hard of hearing significantly both ears.   NECK:  Nontender, supple, symmetrical; no adenopathy, masses or thyromegaly, trachea midline  RESP:  respiratory effort and palpation of chest normal, no chest wall tenderness, no respiratory distress, Lung sounds diminished throughout all fields and moist cough, patient is on oxygen per NC  CV:  Palpation and auscultation of heart done, rate and rhythm controlled and regular, no murmur, no rub or gallop. Edema trace left foot and +1 pitting right lower leg.   ABDOMEN:  Large and full with hypoactive bowel sounds, soft, nontender, no grimacing or guarding with palpation.  M/S:   Gait and station walks with " assist  and unsafe without assistance, Digits and nails fractured finger splinted  NEURO: cranial nerves 2-12 grossly intact, no facial asymmetry, no speech deficits and able to follow directions, moves all extremities symmetrically  PSYCH:  insight and judgement appears intact, affect and mood normal     Lab/Diagnostic data:     CBC RESULTS:   Recent Labs   Lab Test  05/03/17   0915  10/11/16   1620   WBC  8.7  9.3   RBC  4.05*  4.25*   HGB  11.8*  12.6*   HCT  37.9*  39.8*   MCV  94  94   MCH  29.1  29.6   MCHC  31.1*  31.7   RDW  14.1  15.1*   PLT  291  332       Last Basic Metabolic Panel:  Recent Labs   Lab Test  05/03/17 0915  03/06/17   1255   NA  138  139   POTASSIUM  4.2  4.2   CHLORIDE  97  99   GITA  9.3  8.8   CO2  34*  33*   BUN  25  20   CR  0.85  0.82   GLC  124*  129*       Liver Function Studies -   Recent Labs   Lab Test  05/03/17 0915  10/11/16   1620   PROTTOTAL  7.4  8.2   ALBUMIN  3.8  4.0   BILITOTAL  0.3  0.4   ALKPHOS  88  107   AST  27  33   ALT  23  23       TSH   Date Value Ref Range Status   05/14/2014 1.15 0.40 - 4.50 mIU/L Final   03/30/2011 0.86 0.40 - 4.50 mIU/L Final     Comment:     Test Performed at:  [facility] [address]  [address]  JAME OROSCO MD       Lab Results   Component Value Date    A1C 6.3 02/19/2016    A1C 5.4 04/07/2013       ASSESSMENT/PLAN:  (R53.81) Physical deconditioning  (primary encounter diagnosis)  (S91.119D) Toe laceration, subsequent encounter  (W19.XXXD) Fall, subsequent encounter  (S09.90XD) Closed head injury, subsequent encounter  (S62.621S) Closed displaced fracture of middle phalanx of left index finger, sequela  (R53.1) Generalized weakness  Comment: on hospice at baseline due to end stage COPD but due to fall unable to return home, at [facility] for [facility]. Fractured finger and laceration of toe.   Plan: therapies; splint in place on finger and OK to remove sutures from toe in 10 days if  appears healed.     (J44.1) COPD exacerbation (H)  (Z51.5) Palliative care patient  (R52) Generalized pain  Comment: chronic issues. Goal over all is for comfort and on multiple comfort related meds, O2  Plan: continue unchanged. F/U PRN.     (K59.03) Drug-induced constipation  Comment: chronic issue, no stool x 3 days and some GI distention today.   Plan: Stop senna. Increase Senna S dose. Give Miralax today and tomorrow and f/u if ineffective.     Orders:  1. DC senna  2. Increase Senna S to 4 tabs PO BID  3. Miralax 1 pack PO today and 5/6, also continue PRN Miralax as ordered  4. OK to remove right fifth toe sutures in 10 days if healed.     Information reviewed:  Medications, vital signs, orders, nursing notes, problem list, hospital information. Total time spent with patient visit was 35 min including patient visit, review of past records and discussion of patient status and POC with staff. Greater than 50% of total time spent with counseling and coordinating care.    Electronically signed by:  EMI Rutledge CNP

## 2017-05-10 NOTE — PROGRESS NOTES
[facility]    Chief Complaint   Patient presents with     Heart Failure       HPI:    Karan Vaz is a 84 year old  (10/7/1932), who is being seen today for an episodic care visit at [facility].         Today's concern is:  Acute on chronic diastolic congestive heart failure (H)  Bilateral edema of lower extremity  - patient reports SOB slight worse than usual. Increasing LE edema, Weights up from 227 to 230 last two days. VS reviewed and stable.     Chronic obstructive pulmonary disease, unspecified COPD type (H)  Wearing supplemental O2 3LNC per home regimen    Constipation  Reports no BM in a week, bloating. Denies n/v - has been eating.  Per review of NN large BM 5/9 and small 5/10-   Is on bowel med regimen     ALLERGIES: No known allergies  Past Medical, Surgical, Family and Social History reviewed and updated in EPIC.    Current Outpatient Prescriptions   Medication Sig Dispense Refill     TRAMADOL HCL PO Take 25 mg by mouth nightly as needed for moderate to severe pain       methadone (DOLOPHINE) 5 MG tablet Take 0.5 tablets (2.5 mg) by mouth every 12 hours 10 tablet 0     oxyCODONE (ROXICODONE) 5 MG IR tablet Take 0.5-1 tablets (2.5-5 mg) by mouth every 3 hours as needed for moderate to severe pain 20 tablet 0     hydrOXYzine (ATARAX) 25 MG tablet Take 1 tablet (25 mg) by mouth every 6 hours as needed for other (adjuvant pain) 20 tablet 0     hypromellose-dextran 0.3-0.1% (ARTIFICIAL TEARS) opthalmic solution Place 1 drop into both eyes every hour as needed       atropine 1 % ophthalmic solution Take 2-4 drops by mouth every 2 hours as needed for secretions       bisacodyl (DULCOLAX) 10 MG Suppository Place 10 mg rectally 2 times daily as needed for constipation       ipratropium - albuterol 0.5 mg/2.5 mg/3 mL (DUONEB) 0.5-2.5 (3) MG/3ML neb solution Take 1 vial by nebulization 4 times daily       ipratropium - albuterol 0.5 mg/2.5 mg/3 mL (DUONEB) 0.5-2.5 (3)  MG/3ML neb solution Take 1 vial by nebulization every 2 hours as needed for shortness of breath / dyspnea or wheezing       polyethylene glycol (MIRALAX/GLYCOLAX) Packet Take 17 g by mouth daily as needed for constipation       predniSONE (DELTASONE) 10 MG tablet Take 10 mg by mouth daily       DULoxetine (CYMBALTA) 60 MG EC capsule TAKE ONE CAPSULE BY MOUTH ONE TIME DAILY  90 capsule 0     gabapentin (NEURONTIN) 300 MG capsule TAKE TWO CAPSULES BY MOUTH TWICE DAILY  120 capsule 0     tamsulosin (FLOMAX) 0.4 MG capsule Take 1 capsule (0.4 mg) by mouth daily 90 capsule 0     senna-docusate (SENOKOT-S;PERICOLACE) 8.6-50 MG per tablet Take 4 tablets by mouth 2 times daily  100 tablet 3     metoprolol (LOPRESSOR) 25 MG tablet TAKE HALF TABLET BY MOUTH TWICE DAILY  90 tablet 0     albuterol (2.5 MG/3ML) 0.083% neb solution INHALE 1 VIAL BY NEBULIZATION EVERY 2 HOURS AS NEEDED FOR DYSPNEA 540 mL 0     acetaminophen (MAPAP) 500 MG tablet Take 2 tablets (1,000 mg) by mouth 2 times daily 240 tablet 0     lidocaine (XYLOCAINE) 5 % ointment Apply topically 3 times daily as needed for moderate pain 50 g 1     omeprazole (PRILOSEC) 20 MG CR capsule TAKE ONE CAPSULE BY MOUTH ONE TIME DAILY  30 capsule 0     torsemide (DEMADEX) 10 MG tablet Take 1 tablet (10 mg) by mouth daily 30 tablet 1     budesonide (PULMICORT) 0.5 MG/2ML neb solution Take 2 mLs (0.5 mg) by nebulization 2 times daily 60 mL 3     buPROPion (WELLBUTRIN XL) 150 MG 24 hr tablet TAKE ONE TABLET BY MOUTH EVERY NIGHT AT BEDTIME 90 tablet 0     montelukast (SINGULAIR) 10 MG tablet TAKE ONE TABLET BY MOUTH EVERY NIGHT AT BEDTIME 90 tablet 0     albuterol (PROAIR HFA, PROVENTIL HFA, VENTOLIN HFA) 108 (90 BASE) MCG/ACT inhaler Inhale 2 puffs into the lungs every 6 hours as needed for shortness of breath / dyspnea 1 Inhaler 6     order for DME Equipment being ordered: Oxygen Titration visit [facility]  Fax # [phone]  0       REVIEW OF SYSTEMS:  4 point ROS  "including Respiratory, CV, GI and , other than that noted in the HPI,  is negative    Physical Exam:  /75  Pulse 97  Temp 98.1  F (36.7  C)  Resp 21  Ht 5' 5\" (1.651 m)  Wt 230 lb (104.3 kg)  SpO2 95%  BMI 38.27 kg/m2    Resp: Effort slight labored, LS decreased - O2 as abpve  CV: S1-2, no S3-4, no murmurs noted- 1++ edema > right  Abd- soft, nontender, BS +  Musc- YADAV  Skin- kerlix dressing to right foot (per patient just changed and requested not be removed. Per nursing wound healing and, no concerns).   Psych- alert, calm, pleasant      Recent Labs:  CBC RESULTS:   Recent Labs   Lab Test  05/03/17   0915  10/11/16   1620   WBC  8.7  9.3   RBC  4.05*  4.25*   HGB  11.8*  12.6*   HCT  37.9*  39.8*   MCV  94  94   MCH  29.1  29.6   MCHC  31.1*  31.7   RDW  14.1  15.1*   PLT  291  332       Last Basic Metabolic Panel:  Recent Labs   Lab Test  05/03/17   0915  03/06/17   1255   NA  138  139   POTASSIUM  4.2  4.2   CHLORIDE  97  99   GITA  9.3  8.8   CO2  34*  33*   BUN  25  20   CR  0.85  0.82   GLC  124*  129*       Liver Function Studies -   Recent Labs   Lab Test  05/03/17   0915  10/11/16   1620   PROTTOTAL  7.4  8.2   ALBUMIN  3.8  4.0   BILITOTAL  0.3  0.4   ALKPHOS  88  107   AST  27  33   ALT  23  23       TSH   Date Value Ref Range Status   05/14/2014 1.15 0.40 - 4.50 mIU/L Final   03/30/2011 0.86 0.40 - 4.50 mIU/L Final     Comment:     Test Performed at:  [facility] [address]  [address]  JAME OROSCO MD   ]    Lab Results   Component Value Date    A1C 6.3 02/19/2016    A1C 5.4 04/07/2013       Assessment/Plan:  Acute on chronic diastolic congestive heart failure (H)  Bilateral edema of lower extremity   Weight up and increased SOB, will increase Torsemide to 20 mg next two days  - continue on metoprolol  - continue to follow VS, weights    Chronic obstructive pulmonary disease, unspecified COPD type (H)  - continue on home nebs and O2 " regimen. Prednisone     Slow transit constipation  - continue on sched Senna S and prn bisacodyl  - follow clinically            Electronically signed by  EMI Prasad CNP

## 2017-05-10 NOTE — PROGRESS NOTES
PRIMARY CARE PROVIDER AND CLINIC RESPONSIBLE:  Nidia Mitchell,  CLINICS [facility] CARE [address] / [place]*        ADMISSION HISTORY AND PHYSICAL EXAMINATION     Chief Complaint   Patient presents with     Hospital F/U         HISTORY OF PRESENT ILLNESS:  84 year old male, (10/7/1932), [facility] and [facility].    Pt admitted [facility] 5/3 to 5/4 for fall and L index finger fx and R toe laceration.    Pt has chronic SOB that is not worse. No fevers/chills/chest pain.    Please see Triny Mccall's CNP admit noted dated 5/5 for details of admission, past medical history, family history, allergies, medication list, social history and other details pertinent with this admission. Hospital admission and dc summary reviewed.      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     Gastro-oesophageal reflux disease      Moderate persistent asthma      Myoclonus     restless leg, nocturnal restless     Restless legs syndrome (RLS) 3/8/2010     Sensory neuropathy (H) 6/8/2011       Past Surgical History:   Procedure Laterality Date     ARTHROPLASTY HIP BILATERAL       ARTHROSCOPY SHOULDER DISTAL CLAVICLE REPAIR  5/30/2012    Procedure:ARTHROSCOPY SHOULDER DISTAL CLAVICLE RESECTION; Left shoulder arthroscopy, Bicep tenontomy, debridement of partial thickness cuff tear, Subacromial Decompression.; Surgeon:MONIE MENDIETA; Location: OR     C SHOULDER SURG PROC UNLISTED      Shoulder     C SHOULDER SURG PROC UNLISTED      Shoulder     C SHOULDER SURG PROC UNLISTED      Shoulder     HC REVISE MEDIAN N/CARPAL TUNNEL SURG  7/01/02    simonet, left     HC REVISE MEDIAN N/CARPAL TUNNEL SURG  09/24/02    Dr Montiel left     PROST. MICROWAVE THERMOTX  2012    dr Sin      SIGMOIDOSCOPY FLEXIBLE N/A  2/16/2016    Procedure: SIGMOIDOSCOPY FLEXIBLE;  Surgeon: Ky Small MD;  Location:  GI       Current Outpatient Prescriptions   Medication Sig     TRAMADOL HCL PO Take 25 mg by mouth nightly as needed for moderate to severe pain     TRAMADOL HCL PO Take 50 mg by mouth 3 times daily as needed for moderate to severe pain     methadone (DOLOPHINE) 5 MG tablet Take 0.5 tablets (2.5 mg) by mouth every 12 hours     oxyCODONE (ROXICODONE) 5 MG IR tablet Take 0.5-1 tablets (2.5-5 mg) by mouth every 3 hours as needed for moderate to severe pain     hydrOXYzine (ATARAX) 25 MG tablet Take 1 tablet (25 mg) by mouth every 6 hours as needed for other (adjuvant pain)     hypromellose-dextran 0.3-0.1% (ARTIFICIAL TEARS) opthalmic solution Place 1 drop into both eyes every hour as needed     atropine 1 % ophthalmic solution Take 2-4 drops by mouth every 2 hours as needed for secretions     bisacodyl (DULCOLAX) 10 MG Suppository Place 10 mg rectally 2 times daily as needed for constipation     ipratropium - albuterol 0.5 mg/2.5 mg/3 mL (DUONEB) 0.5-2.5 (3) MG/3ML neb solution Take 1 vial by nebulization 4 times daily     ipratropium - albuterol 0.5 mg/2.5 mg/3 mL (DUONEB) 0.5-2.5 (3) MG/3ML neb solution Take 1 vial by nebulization every 2 hours as needed for shortness of breath / dyspnea or wheezing     polyethylene glycol (MIRALAX/GLYCOLAX) Packet Take 17 g by mouth daily as needed for constipation     predniSONE (DELTASONE) 10 MG tablet Take 10 mg by mouth daily     DULoxetine (CYMBALTA) 60 MG EC capsule TAKE ONE CAPSULE BY MOUTH ONE TIME DAILY      gabapentin (NEURONTIN) 300 MG capsule TAKE TWO CAPSULES BY MOUTH TWICE DAILY      tamsulosin (FLOMAX) 0.4 MG capsule Take 1 capsule (0.4 mg) by mouth daily     senna-docusate (SENOKOT-S;PERICOLACE) 8.6-50 MG per tablet Take 4 tablets by mouth 2 times daily      metoprolol (LOPRESSOR) 25 MG tablet TAKE HALF TABLET BY MOUTH TWICE DAILY      albuterol (2.5 MG/3ML) 0.083% neb  solution INHALE 1 VIAL BY NEBULIZATION EVERY 2 HOURS AS NEEDED FOR DYSPNEA     acetaminophen (MAPAP) 500 MG tablet Take 2 tablets (1,000 mg) by mouth 2 times daily     lidocaine (XYLOCAINE) 5 % ointment Apply topically 3 times daily as needed for moderate pain     omeprazole (PRILOSEC) 20 MG CR capsule TAKE ONE CAPSULE BY MOUTH ONE TIME DAILY      torsemide (DEMADEX) 10 MG tablet Take 1 tablet (10 mg) by mouth daily     budesonide (PULMICORT) 0.5 MG/2ML neb solution Take 2 mLs (0.5 mg) by nebulization 2 times daily     buPROPion (WELLBUTRIN XL) 150 MG 24 hr tablet TAKE ONE TABLET BY MOUTH EVERY NIGHT AT BEDTIME     montelukast (SINGULAIR) 10 MG tablet TAKE ONE TABLET BY MOUTH EVERY NIGHT AT BEDTIME     albuterol (PROAIR HFA, PROVENTIL HFA, VENTOLIN HFA) 108 (90 BASE) MCG/ACT inhaler Inhale 2 puffs into the lungs every 6 hours as needed for shortness of breath / dyspnea     Simethicone 180 MG CAPS Take 1 capsule by mouth 2 times daily     order for DME Equipment being ordered: Oxygen Titration visit [facility]  Fax # [phone]     No current facility-administered medications for this visit.        Allergies   Allergen Reactions     No Known Allergies        Social History     Social History     Marital status:      Spouse name: Britt     Number of children: N/A     Years of education: 12     Occupational History      Retired     [employer]     Social History Main Topics     Smoking status: Former Smoker     Quit date: 1/1/1990     Smokeless tobacco: Never Used      Comment: 20 years ago     Alcohol use No     Drug use: No     Sexual activity: Yes     Partners: Female     Other Topics Concern     Not on file     Social History Narrative          Information reviewed:  Medications, vital signs, orders, nursing notes, problem list, hospital information.     ROS: All 10 point review of system completed, those pertinent positive, please see H&P, the remaining ROS is negative.    /72  Pulse 91  Temp  "98.5  F (36.9  C)  Resp 21  Ht 5' 5\" (1.651 m)  Wt 227 lb (103 kg)  SpO2 95%  BMI 37.77 kg/m2    PHYSICAL EXAMINATION:   GENERAL:  No acute distress. Sitting in a chair, on NC, wife and daughter visiting. [place].  SKIN:  Dry and warm.  There is no rash, lesions, ulcers or juandice at area of skin examined.  HEENT:  Head without trauma.  Pupils round, reactive. Exam of conjunctiva and lids are normal. Sclera without icterus. There is no oral thrush.  NECK:  Supple.  There is no cervical adenopathy, no thyromegaly. No jugular venous distension.  CHEST: No reproducible chest tenderness.   LUNGS:  Normal respiratory effort. + crackles in bases.  HEART:  Regular rate and rhythm.  No murmur, gallops or rubs auscultated.  ABDOMEN:  Soft, bowel sounds positive.  There is no tenderness or guarding.   EXTREMITIES: 1+  edema.  No calf swelling. L index finger in splint. + dressing R foot which I did not uncover.  NEUROLOGIC:  Alert and oriented x3.       Lab/Diagnostic data:  Reviewed    Lab Results   Component Value Date    WBC 8.7 05/03/2017     Lab Results   Component Value Date    RBC 4.05 05/03/2017     Lab Results   Component Value Date    HGB 11.8 05/03/2017     Lab Results   Component Value Date    HCT 37.9 05/03/2017     Lab Results   Component Value Date    MCV 94 05/03/2017     Lab Results   Component Value Date    MCH 29.1 05/03/2017     Lab Results   Component Value Date    MCHC 31.1 05/03/2017     Lab Results   Component Value Date    RDW 14.1 05/03/2017     Lab Results   Component Value Date     05/03/2017       Last Basic Metabolic Panel:  Lab Results   Component Value Date     05/03/2017      Lab Results   Component Value Date    POTASSIUM 4.2 05/03/2017     Lab Results   Component Value Date    CHLORIDE 97 05/03/2017     Lab Results   Component Value Date    GITA 9.3 05/03/2017     Lab Results   Component Value Date    CO2 34 05/03/2017     Lab Results   Component Value Date    BUN 25 05/03/2017 "     Lab Results   Component Value Date    CR 0.85 05/03/2017     Lab Results   Component Value Date     05/03/2017         ASSESSMENT / PLAN:     Closed displaced fracture of middle phalanx of left index finger with routine healing, subsequent encounter  Laceration of fifth toe, right, sequela  - Splint in index finger.  - Remove sutures from toe as directed.  - Pain control.    Panlobular emphysema (H)  - On home oxygen, has end stage COPD and on hospice.  - Oxygen, prednisone, nebs and pulmicort.    Benign prostatic hyperplasia, presence of lower urinary tract symptoms unspecified, unspecified morphology  - On flomax.    Benign essential hypertension  - On lopressor.    Chronic diastolic congestive heart failure (H)  - On lopressor and demadex.  - Will get CXR as appears volume overloaded.    Depression and anxiety.  - On cymbalta and wellbutrin.    Physical deconditioning  -Plan: PT/OT, fall precautions. Care conference with patient and family for the progress of rehab and disposition issues will be discussed as planned. Rehab evaluation and other evaluations including CPT are at rehab logs, to be reviewed separately.  Fall risk assessment as well as cognitive evaluation will be formed during rehab stay if indicated.      Other problems with same care. Primary care doctor and other specialists to address those chronic problems in next clinic appointment to be scheduled upon discharge from the TCU.    Total time spent with patient visit was 45 min including patient visit, review of past records, 1/2 time on patients counseling and coordinating care.        Sidney Calzada MD

## 2017-05-11 NOTE — ED AVS SNAPSHOT
[facility] Emergency Department    [address]    [facility] [address]    Phone:  [phone]    Fax:  [phone]                                       Karan Vaz   MRN: [id number]    Department:  [facility] Emergency Department   Date of Visit:  5/11/2017           Patient Information     Date Of Birth          10/7/1932        Your diagnoses for this visit were:     Cellulitis of right lower extremity     Pain of toe of right foot        You were seen by Sal Eugene MD.      Follow-up Information     Follow up with Nidia Mitchell APRN CNP. Schedule an appointment as soon as possible for a visit in 4 days.    Specialty:  Nurse Practitioner - Adult Health    Contact information:    [facility]  [address] [place] [id number]  [facility] [address]  [phone]          Discharge Instructions       PICC LINE CARE: Apply Warm Moist Pack 4 times daily to upper arm with PICC. Do not apply on top of PICC dressing, 20 minutes, 4 times per day for the first 48 hours post PICC placement.   Change PICC dressing 5/12/2017 before 1600 per facility Central Line Dressing Policy.  Do not use gauze under PICC dressing at any time.          Discharge Instructions  Cellulitis    Cellulitis is an infection of the skin that occurs when bacteria enter the skin.   Symptoms are generally redness, swelling, warmth and pain.  Your infection appeared to be appropriate to treat at home with antibiotics.  However, sometimes your infection may be worse than it seemed at first, or may worsen with time. If you have new or worse symptoms, you may need to be seen again in the Emergency Department or by your primary doctor.    Return to the Emergency Department if:    The redness, pain, or swelling gets a lot worse.  If the red area was marked, return if it is red beyond the marked area.    You are unable to get your antibiotics, or are vomiting them up or you can t take  "them.    You are feeling more ill, weak or lightheaded.    You start to run a new fever (temperature >101).    Anything else about the infection worries or concerns you.  Treatment:    Start your antibiotics right away, and take them as prescribed. Be sure to finish the whole prescription, even if you are better.    Apply a heating pad, warm packs, or warm water soaks to the infected area for 15 minutes at a time, at least 3 times a day. Do not use a heating pad on your feet or legs if you have diabetes. Do not sleep with a heating pad on, since this can cause burns or skin injury.    Rest your injured area for at least 1-2 days. After that you may start using your extremity again as long as there is not too much pain.     Raise the injured area above the level of your heart as much as possible in the first 1-2 days.    Tylenol  (acetaminophen), Motrin  (ibuprofen), or Advil  (ibuprofen) may help may help reduce pain and fever and may help you feel more comfortable. Be sure to read and follow the package directions, and ask your doctor if you have questions.    Follow-up with your doctor:    Re-check in clinic within 2-3 days.  Probiotics: If you have been given an antibiotic, you may want to also take a probiotic pill or eat yogurt with live cultures. Probiotics have \"good bacteria\" to help your intestines stay healthy. Studies have shown that probiotics help prevent diarrhea and other intestine problems (including C. diff infection) when you take antibiotics. You can buy these without a prescription in the pharmacy section of the store.     If you were given a prescription for medicine here today, be sure to read all of the information (including the package insert) that comes with your prescription.  This will include important information about the medicine, its side effects, and any warnings that you need to know about.  The pharmacist who fills the prescription can provide more information and answer questions " you may have about the medicine.  If you have questions or concerns that the pharmacist cannot address, please call or return to the Emergency Department.         Future Appointments        Provider Department Dept Phone Center    5/12/2017 9:45 AM EMI Osborn CNP [facility] [phone] [facility]    5/12/2017 9:45 AM RoshanTORSTEN [facility] [phone] [facility]    6/9/2017 9:45 AM EMI Osborn CNP [facility] [phone] [facility]      24 Hour Appointment Hotline       To make an appointment at any [facility], call [phone] ([phone]). If you don't have a family doctor or clinic, we will help you find one. [facility] are conveniently located to serve the needs of you and your family.             Review of your medicines      START taking        Dose / Directions Last dose taken    piperacillin sod-tazobactam 40.5 (36-4.5) G Solr   Dose:  3.375 g   Quantity:  675.2 mL        Inject 16.88 mLs (3.375 g) into the vein 4 times daily for 10 days   Refills:  0          Our records show that you are taking the medicines listed below. If these are incorrect, please call your family doctor or clinic.        Dose / Directions Last dose taken    * albuterol 108 (90 BASE) MCG/ACT Inhaler   Commonly known as:  PROAIR HFA/PROVENTIL HFA/VENTOLIN HFA   Dose:  2 puff   Quantity:  1 Inhaler        Inhale 2 puffs into the lungs every 6 hours as needed for shortness of breath / dyspnea   Refills:  6        * albuterol (2.5 MG/3ML) 0.083% neb solution   Quantity:  540 mL        INHALE 1 VIAL BY NEBULIZATION EVERY 2 HOURS AS NEEDED FOR DYSPNEA   Refills:  0        atropine 1 % ophthalmic solution   Dose:  2-4 drop        Take 2-4 drops by mouth every 2 hours as needed for secretions   Refills:  0        bisacodyl 10 MG Suppository   Commonly known as:  DULCOLAX   Dose:  10 mg        Place 10 mg rectally 2 times daily as needed for  constipation   Refills:  0        budesonide 0.5 MG/2ML neb solution   Commonly known as:  PULMICORT   Dose:  0.5 mg   Quantity:  60 mL        Take 2 mLs (0.5 mg) by nebulization 2 times daily   Refills:  3        buPROPion 150 MG 24 hr tablet   Commonly known as:  WELLBUTRIN XL   Quantity:  90 tablet        TAKE ONE TABLET BY MOUTH EVERY NIGHT AT BEDTIME   Refills:  0        DULoxetine 60 MG EC capsule   Commonly known as:  CYMBALTA   Quantity:  90 capsule        TAKE ONE CAPSULE BY MOUTH ONE TIME DAILY   Refills:  0        gabapentin 300 MG capsule   Commonly known as:  NEURONTIN   Quantity:  120 capsule        TAKE TWO CAPSULES BY MOUTH TWICE DAILY   Refills:  0        hydrOXYzine 25 MG tablet   Commonly known as:  ATARAX   Dose:  25 mg   Quantity:  20 tablet        Take 1 tablet (25 mg) by mouth every 6 hours as needed for other (adjuvant pain)   Refills:  0        hypromellose-dextran 0.3-0.1% opthalmic solution   Dose:  1 drop        Place 1 drop into both eyes every hour as needed   Refills:  0        * ipratropium - albuterol 0.5 mg/2.5 mg/3 mL 0.5-2.5 (3) MG/3ML neb solution   Commonly known as:  DUONEB   Dose:  1 vial        Take 1 vial by nebulization 4 times daily   Refills:  0        * ipratropium - albuterol 0.5 mg/2.5 mg/3 mL 0.5-2.5 (3) MG/3ML neb solution   Commonly known as:  DUONEB   Dose:  1 vial        Take 1 vial by nebulization every 2 hours as needed for shortness of breath / dyspnea or wheezing   Refills:  0        lidocaine 5 % ointment   Commonly known as:  XYLOCAINE   Quantity:  50 g        Apply topically 3 times daily as needed for moderate pain   Refills:  1        MAPAP 500 MG tablet   Dose:  1000 mg   Quantity:  240 tablet   Generic drug:  acetaminophen        Take 2 tablets (1,000 mg) by mouth 2 times daily   Refills:  0        methadone 5 MG tablet   Commonly known as:  DOLOPHINE   Dose:  2.5 mg   Quantity:  10 tablet        Take 0.5 tablets (2.5 mg) by mouth every 12 hours    Refills:  0        metoprolol 25 MG tablet   Commonly known as:  LOPRESSOR   Quantity:  90 tablet        TAKE HALF TABLET BY MOUTH TWICE DAILY   Refills:  0        montelukast 10 MG tablet   Commonly known as:  SINGULAIR   Quantity:  90 tablet        TAKE ONE TABLET BY MOUTH EVERY NIGHT AT BEDTIME   Refills:  0        omeprazole 20 MG CR capsule   Commonly known as:  priLOSEC   Quantity:  30 capsule        TAKE ONE CAPSULE BY MOUTH ONE TIME DAILY   Refills:  0        order for DME        Equipment being ordered: Oxygen Titration visit [facility] Fax # [phone]   Refills:  0        oxyCODONE 5 MG IR tablet   Commonly known as:  ROXICODONE   Dose:  2.5-5 mg   Quantity:  20 tablet        Take 0.5-1 tablets (2.5-5 mg) by mouth every 3 hours as needed for moderate to severe pain   Refills:  0        polyethylene glycol Packet   Commonly known as:  MIRALAX/GLYCOLAX   Dose:  17 g        Take 17 g by mouth daily as needed for constipation   Refills:  0        predniSONE 10 MG tablet   Commonly known as:  DELTASONE   Dose:  10 mg        Take 10 mg by mouth daily   Refills:  0        senna-docusate 8.6-50 MG per tablet   Commonly known as:  SENOKOT-S;PERICOLACE   Dose:  4 tablet   Quantity:  100 tablet        Take 4 tablets by mouth 2 times daily   Refills:  3        tamsulosin 0.4 MG capsule   Commonly known as:  FLOMAX   Dose:  0.4 mg   Quantity:  90 capsule        Take 1 capsule (0.4 mg) by mouth daily   Refills:  0        torsemide 10 MG tablet   Commonly known as:  DEMADEX   Dose:  10 mg   Quantity:  30 tablet        Take 1 tablet (10 mg) by mouth daily   Refills:  1        TRAMADOL HCL PO   Dose:  25 mg        Take 25 mg by mouth nightly as needed for moderate to severe pain   Refills:  0        * Notice:  This list has 4 medication(s) that are the same as other medications prescribed for you. Read the directions carefully, and ask your doctor or other care provider to review them with you.             Prescriptions were sent or printed at these locations (1 Prescription)                   Other Prescriptions                Printed at Department/Unit printer (1 of 1)         piperacillin sod-tazobactam 40.5 (36-4.5) G SOLR                Procedures and tests performed during your visit     Procedure/Test Number of Times Performed    Basic metabolic panel 1    Blood culture 2    CBC with platelets differential 1    Chest XR,  PA & LAT 1    Foot  XR, G/E 3 views, right 1    Lactic acid whole blood 1    MR Foot Right w/o & w Contrast 1    Peripheral IV catheter 1    Pulse oximetry nursing 1    Vital signs 1      Orders Needing Specimen Collection     None      Pending Results     Date and Time Order Name Status Description    5/11/2017 1107 Blood culture Preliminary     5/11/2017 1107 Blood culture Preliminary             Pending Culture Results     Date and Time Order Name Status Description    5/11/2017 1107 Blood culture Preliminary     5/11/2017 1107 Blood culture Preliminary             Pending Results Instructions     If you had any lab results that were not finalized at the time of your Discharge, you can call the ED Lab Result RN at [phone]. You will be contacted by this team for any positive Lab results or changes in treatment. The nurses are available 7 days a week from 10A to 6:30P.  You can leave a message 24 hours per day and they will return your call.        Test Results From Your Hospital Stay        5/11/2017 11:40 AM      Component Results     Component Value Ref Range & Units Status    WBC 13.7 (H) 4.0 - 11.0 10e9/L Final    RBC Count 3.67 (L) 4.4 - 5.9 10e12/L Final    Hemoglobin 10.8 (L) 13.3 - 17.7 g/dL Final    Hematocrit 34.5 (L) 40.0 - 53.0 % Final    MCV 94 78 - 100 fl Final    MCH 29.4 26.5 - 33.0 pg Final    MCHC 31.3 (L) 31.5 - 36.5 g/dL Final    RDW 14.2 10.0 - 15.0 % Final    Platelet Count 369 150 - 450 10e9/L Final    Diff Method Automated Method  Final    % Neutrophils 85.8 %  Final    % Lymphocytes 4.4 % Final    % Monocytes 6.6 % Final    % Eosinophils 1.5 % Final    % Basophils 0.2 % Final    % Immature Granulocytes 1.5 % Final    Nucleated RBCs 0 0 /100 Final    Absolute Neutrophil 11.8 (H) 1.6 - 8.3 10e9/L Final    Absolute Lymphocytes 0.6 (L) 0.8 - 5.3 10e9/L Final    Absolute Monocytes 0.9 0.0 - 1.3 10e9/L Final    Absolute Eosinophils 0.2 0.0 - 0.7 10e9/L Final    Absolute Basophils 0.0 0.0 - 0.2 10e9/L Final    Abs Immature Granulocytes 0.2 0 - 0.4 10e9/L Final    Absolute Nucleated RBC 0.0  Final         5/11/2017 11:56 AM      Component Results     Component Value Ref Range & Units Status    Sodium 137 133 - 144 mmol/L Final    Potassium 4.3 3.4 - 5.3 mmol/L Final    Chloride 98 94 - 109 mmol/L Final    Carbon Dioxide 36 (H) 20 - 32 mmol/L Final    Anion Gap 3 3 - 14 mmol/L Final    Glucose 173 (H) 70 - 99 mg/dL Final    Urea Nitrogen 21 7 - 30 mg/dL Final    Creatinine 0.89 0.66 - 1.25 mg/dL Final    GFR Estimate 82 >60 mL/min/1.7m2 Final    Non  GFR Calc    GFR Estimate If Black >90   GFR Calc   >60 mL/min/1.7m2 Final    Calcium 9.2 8.5 - 10.1 mg/dL Final         5/11/2017 11:40 AM      Component Results     Component Value Ref Range & Units Status    Lactic Acid 1.7 0.7 - 2.1 mmol/L Final         5/11/2017  3:13 PM      Component Results     Component    Specimen Description    Blood Right Arm    Special Requests    Aerobic and anaerobic bottles received    Culture Micro    No growth after 1 hour    Micro Report Status    Pending         5/11/2017  3:13 PM      Component Results     Component    Specimen Description    Blood Left Arm    Special Requests    Aerobic and anaerobic bottles received    Culture Micro    No growth after 1 hour    Micro Report Status    Pending         5/11/2017 12:14 PM      Narrative     XR FOOT RT G/E 3 VW  5/11/2017 12:03 PM    HISTORY:  pain, cellulitis, h/o laceration, eval for fb/ gas in  tissue. Site not  specified.    COMPARISON:  5/3/2017        Impression     IMPRESSION:  Flexion at the toe IP joints, limiting assessment.  Diffuse swelling of the fifth toe. Suggestion of an area of indistinct  cortex, possibly erosion, along the medial aspect of the distal  phalanx, raising the question of subtle early osteomyelitis.    MEENU BARRETO MD         5/11/2017  3:12 PM      Narrative     XR CHEST 2 VW 5/11/2017 2:55 PM    COMPARISON: 4/1/2016    HISTORY: Cough        Impression     IMPRESSION: Cardiac silhouette is within normal limits. No focal acute  airspace disease. No pleural effusion or pneumothorax.    LIANG WARD         5/11/2017  3:27 PM      Narrative     MR RIGHT FOOT WITHOUT AND WITH CONTRAST   5/11/2017 3:02 PM     HISTORY: Laceration 5/3/17, now cellulitis/abnormal x-ray, evaluate  for abscess/osteomyelitis.    DOSE:  10 mL Gadavist    FINDINGS: The fifth toe proximal phalangeal diaphysis is poorly seen  and thought to be fractured. This is not confirmed on the 5/11/2017  radiographs given overlap of the toes on the lateral view and  dorsiflexion of the toes on the frontal radiographs. Marrow edema  within the fifth toe proximal phalangeal head therefore may be  reactive. There may be mild marrow edema about the medial aspect of  the fifth toe distal interphalangeal joint which is nonspecific. There  is a physiologic amount of fluid in the metatarsophalangeal joints.  Dorsal and deep soft tissue edema is noted within the forefoot. No  rim-enhancing soft tissue fluid collection or abscess is demonstrated.  Flexor and extensor tendons appear grossly normal.        Impression     IMPRESSION:  1. Fifth toe proximal phalangeal diaphysis is poorly seen and thought  to be fractured. Adjacent marrow edema therefore may be reactive.  However, osteomyelitis cannot be entirely excluded, particularly if  there is an adjacent wound or draining sinus.  2. Fifth toe distal phalangeal periarticular marrow edema which  also  could be degenerative or reactive. Again, osteomyelitis or septic  arthritis cannot be excluded, particularly if there is an adjacent  wound or draining sinus.  3. Diffuse forefoot dorsal and deeper soft tissue edema. MR cannot  distinguish reactive edema from cellulitis. No rim-enhancing soft  tissue fluid collection or abscess is demonstrated.    GAURAV TAYLOR MD                Clinical Quality Measure: Blood Pressure Screening     Your blood pressure was checked while you were in the emergency department today. The last reading we obtained was  BP: 130/75 . Please read the guidelines below about what these numbers mean and what you should do about them.  If your systolic blood pressure (the top number) is less than 120 and your diastolic blood pressure (the bottom number) is less than 80, then your blood pressure is normal. There is nothing more that you need to do about it.  If your systolic blood pressure (the top number) is 120-139 or your diastolic blood pressure (the bottom number) is 80-89, your blood pressure may be higher than it should be. You should have your blood pressure rechecked within a year by a primary care provider.  If your systolic blood pressure (the top number) is 140 or greater or your diastolic blood pressure (the bottom number) is 90 or greater, you may have high blood pressure. High blood pressure is treatable, but if left untreated over time it can put you at risk for heart attack, stroke, or kidney failure. You should have your blood pressure rechecked by a primary care provider within the next 4 weeks.  If your provider in the emergency department today gave you specific instructions to follow-up with your doctor or provider even sooner than that, you should follow that instruction and not wait for up to 4 weeks for your follow-up visit.        Thank you for choosing [facility]       Thank you for choosing [facility] for your care. Our goal is always to provide you with excellent  "care. Hearing back from our patients is one way we can continue to improve our services. Please take a few minutes to complete the written survey that you may receive in the mail after you visit with us. Thank you!        [employer] Information     [employer] lets you send messages to your doctor, view your test results, renew your prescriptions, schedule appointments and more. To sign up, go to [email or web address] . Click on \"Log in\" on the left side of the screen, which will take you to the Welcome page. Then click on \"Sign up Now\" on the right side of the page.     You will be asked to enter the access code listed below, as well as some personal information. Please follow the directions to create your username and password.     Your access code is: [email or web address]  Expires: 2017 12:02 AM     Your access code will  in 90 days. If you need help or a new code, please call your [place] clinic or [phone].        Care EveryWhere ID     This is your Care EveryWhere ID. This could be used by other organizations to access your [place] medical records  [id number]        After Visit Summary       This is your record. Keep this with you and show to your community pharmacist(s) and doctor(s) at your next visit.                  "

## 2017-05-11 NOTE — PROGRESS NOTES
PICC Insertion Time-Out   Proceduralist prior to procedure:    Confirmed provider order for procedure    Verified appropriate supplies/equipment are available for procedure    Verified appropriate assessments have been completed     Prior to procedure the proceduralist instituted a 'Cease all activity' to confirm with a second nursing staff member the following:     Confirm patient identifiers using patient name and date of birth    Verify procedure to be performed    Verify consent was signed and witnessed    Verbalize any allergies    Verify code status     [Co-signature verification:  IV Access flowsheet > click any insertion column associated to a note > view Value Information)     Khloe Victor RN

## 2017-05-11 NOTE — ED NOTES
Called Conrad to give Charge Nurse report and instructions about PICC line care.  PICC line care instructions also provided on discharge paperwork.

## 2017-05-11 NOTE — DISCHARGE INSTRUCTIONS
PICC LINE CARE: Apply Warm Moist Pack 4 times daily to upper arm with PICC. Do not apply on top of PICC dressing, 20 minutes, 4 times per day for the first 48 hours post PICC placement.   Change PICC dressing 5/12/2017 before 1600 per facility Central Line Dressing Policy.  Do not use gauze under PICC dressing at any time.          Discharge Instructions  Cellulitis    Cellulitis is an infection of the skin that occurs when bacteria enter the skin.   Symptoms are generally redness, swelling, warmth and pain.  Your infection appeared to be appropriate to treat at home with antibiotics.  However, sometimes your infection may be worse than it seemed at first, or may worsen with time. If you have new or worse symptoms, you may need to be seen again in the Emergency Department or by your primary doctor.    Return to the Emergency Department if:    The redness, pain, or swelling gets a lot worse.  If the red area was marked, return if it is red beyond the marked area.    You are unable to get your antibiotics, or are vomiting them up or you can t take them.    You are feeling more ill, weak or lightheaded.    You start to run a new fever (temperature >101).    Anything else about the infection worries or concerns you.  Treatment:    Start your antibiotics right away, and take them as prescribed. Be sure to finish the whole prescription, even if you are better.    Apply a heating pad, warm packs, or warm water soaks to the infected area for 15 minutes at a time, at least 3 times a day. Do not use a heating pad on your feet or legs if you have diabetes. Do not sleep with a heating pad on, since this can cause burns or skin injury.    Rest your injured area for at least 1-2 days. After that you may start using your extremity again as long as there is not too much pain.     Raise the injured area above the level of your heart as much as possible in the first 1-2 days.    Tylenol  (acetaminophen), Motrin  (ibuprofen), or  "Advil  (ibuprofen) may help may help reduce pain and fever and may help you feel more comfortable. Be sure to read and follow the package directions, and ask your doctor if you have questions.    Follow-up with your doctor:    Re-check in clinic within 2-3 days.  Probiotics: If you have been given an antibiotic, you may want to also take a probiotic pill or eat yogurt with live cultures. Probiotics have \"good bacteria\" to help your intestines stay healthy. Studies have shown that probiotics help prevent diarrhea and other intestine problems (including C. diff infection) when you take antibiotics. You can buy these without a prescription in the pharmacy section of the store.     If you were given a prescription for medicine here today, be sure to read all of the information (including the package insert) that comes with your prescription.  This will include important information about the medicine, its side effects, and any warnings that you need to know about.  The pharmacist who fills the prescription can provide more information and answer questions you may have about the medicine.  If you have questions or concerns that the pharmacist cannot address, please call or return to the Emergency Department.       "

## 2017-05-11 NOTE — PROGRESS NOTES
D: MADDISON responding to MD request.  Mr. Vaz was sent to the ED from [facility] where he is receiving rehab care.  He is also with [facility], spoke with Gary of [facility] ([phone]) regarding treatment needs, Gary gave approval for treatment.  Spoke with RN at [facility], they are able to manage pt's IV antibiotics and ask that the IV line be placed here prior to discharging back to TCU.  Message conveyed to ED RN.

## 2017-05-11 NOTE — ED AVS SNAPSHOT
[facility] Emergency Department    [address]    [facility] [address]    Phone:  [phone]    Fax:  [phone]                                       Karan Vaz   MRN: [id number]    Department:  [facility] Emergency Department   Date of Visit:  5/11/2017           After Visit Summary Signature Page     I have received my discharge instructions, and my questions have been answered. I have discussed any challenges I see with this plan with the nurse or doctor.    ..........................................................................................................................................  Patient/Patient Representative Signature      ..........................................................................................................................................  Patient Representative Print Name and Relationship to Patient    ..................................................               ................................................  Date                                            Time    ..........................................................................................................................................  Reviewed by Signature/Title    ...................................................              ..............................................  Date                                                            Time

## 2017-05-11 NOTE — ED NOTES
A&Ox4. ABC's intact. Pt c/o right foot cellulitis.  Pt arrives via EMS from TCU.  Noted redness and swelling of right foot. 5th toe swollen with sutures in place.  Pt states they are from about 2 weeks ago after he fell and the skin tore away and needed to be sutured back in place.  Pain 8/10 at this time.     States he typically uses a walker for ambulation, but is unable to walk on the right foot at this time.  PT on 3L NC.  EMS stated this is baseline for pt.

## 2017-05-11 NOTE — ED NOTES
Bed: ED24  Expected date: 5/11/17  Expected time: 10:35 AM  Means of arrival: Ambulance  Comments:  BV 1

## 2017-05-11 NOTE — ED PROVIDER NOTES
History     Chief Complaint:  Leg redness    HPI   Karan Vaz is a 84 year old male who presents via EMS from his TCU with leg redness and swelling. Patient was admitted to the hospital last week after weakness and inability to stand after a fall. Today, the patient presents with redness of his right lower leg. He states this began while he was in the hospital last week. There also appears to be some swelling of this leg. He has been unable to walk on the right foot. He denies fevers, chills, or nausea.    Allergies:  The patient has no known drug allergies.    Medications:    Atarax  Roxicodone  Mapap  Cymbalta  Neurontin  Flomax  Ultram  Senokot  Lopressor  Albuterol nebulizer  Spiriva HandiHaler  Formoterol nebulizer  Prilosec  Desyrel   Demadex  Pulmicort  Wellbutrin  Singulair   Proair  Simethicone      Past Medical History:    Bladder neck obstruction  COPD  Chronic insomnia  Elastofibroma thoracic wall, benign  GERD  Moderate persistent asthma  Myoclonus  RLS  Sensory neuropathy   Hypertrophy of prostate with urinary retention     Past Surgical History:   Arthroplasty hip bilateral  Arthroscopy shoulder distal clavicle repair  Revise median carpal tunnel surgery x2  Sigmoidoscopy flexible      Family History:  History reviewed. No significant family history.      Social History:  Relationship status:   The patient formerly smoked (Quit date 1/1/1990).   The patient does not drink alcohol.   The patient lives at home with his wife.     Review of Systems   Cardiovascular: Positive for leg swelling.   Musculoskeletal:        Positive for leg pain.   Skin: Positive for rash.   All other systems reviewed and are negative.      Physical Exam   First Vitals:  BP: 136/69  Heart Rate: 78  Temp: 98  F (36.7  C)  Resp: 20  Weight: 104.3 kg (229 lb 15 oz)  SpO2: 98 %    Physical Exam   General: The patient is alert, in no respiratory distress.    HENT: Mucous membranes moist.    Cardiovascular: Regular rate  and rhythm. Good pulses in all four extremities. Normal capillary refill and skin turgor.     Respiratory: Lungs are clear. No nasal flaring. No retractions. No wheezing, no crackles. Breath sounds are decreased.     Gastrointestinal: Abdomen soft. No guarding, no rebound. No palpable hernias.     Musculoskeletal: No gross deformity.     Skin: Erythema on the dorsum, lateral, and lateral plantar surface of right foot. Suture line on planar surface of right fifth toe is intact. No drainage. Swelling of the toes.    Neurologic: The patient is alert and oriented x3. GCS 15. No testable cranial nerve deficit. Follows commands with clear and appropriate speech. Gives appropriate answers. No gross neurologic deficit. Gross sensation intact. Pupils are round and reactive. No meningismus. Hard of hearing. Some generalized weakness.    Lymphology: No cervical adenopathy. No lower extremity swelling.    Psychiatric: The patient is non-tearful.      Emergency Department Course     Imaging:  Radiographic findings were communicated with the patient who voiced understanding of the findings.    MR Right Foot w/o & w Contrast:  1. Fifth toe proximal phalangeal diaphysis is poorly seen and thought to be fractured. Adjacent marrow edema therefore may be reactive. However, osteomyelitis cannot be entirely excluded, particularly if  there is an adjacent wound or draining sinus.  2. Fifth toe distal phalangeal periarticular marrow edema which also could be degenerative or reactive. Again, osteomyelitis or septic arthritis cannot be excluded, particularly if there is an adjacent wound or draining sinus.  3. Diffuse forefoot dorsal and deeper soft tissue edema. MR cannot distinguish reactive edema from cellulitis. No rim-enhancing soft tissue fluid collection or abscess is demonstrated.    Right Foot XR, per radiology:   Flexion at the toe IP joints, limiting assessment. Diffuse swelling of the fifth toe. Suggestion of an area of  indistinct cortex, possibly erosion, along the medial aspect of the distal phalanx, raising the question of subtle early osteomyelitis.    Chest XR, PA and LAT, per radiology:   Cardiac silhouette is within normal limits. No focal acute airspace disease. No pleural effusion or pneumothorax.    Laboratory:  CBC: WBC 13.7 (H), HGB 10.8 (L),   BMP: Glucose 173 (H), CO2 36 (H), o/w WNL (Creatinine 0.89)  Lactate: 1.7  Blood culture 1: Pending  Blood culture 2: Pending     Interventions:  1210: Zosyn, 3.375 g, IV    Emergency Department Course:  Nursing notes and vitals reviewed.  I performed an exam of the patient as documented above.  The above workup was undertaken.  1230: I rechecked the patient and discussed results.  1324: I discussed the patient with social work, who just spoke with Gary at hospice. The patient will get a chest x-ray.  1534: I discussed the patient with Dr. Huizar of orthopedics. He says to treat as cellulitis based on report.    Findings and plan explained to the Patient. Patient discharged home, status improved, with instructions regarding supportive care, medications, and reasons to return as well as the importance of close follow-up was reviewed.      Impression & Plan      Medical Decision Making:  Karan Vaz is a 84 year old male who was seen previously on 5/3 by me, and sutured his foot after a fall. At that time he was admitted to the hospital. Patient says he has developed redness over foot over past several days, and has cellulitis.The area does not extend around the area of the laceration, which looks quite good. There is some adjacent swelling. I thought this could be cellulitis. I also considered retained foreign body, and/or fracture. X-ray was ordered. There was some question about osteomyelitis, therefore MRI was ordered. I discussed the patient with Dr. Huizar of orthopedics, who did not feel debridement was required . The patient s white count is mildly elevated. He is quite  comfortable and not showing systemic symptoms. PICC line was placed. It was arranged for patient to receive IV antibiotics at his facility. He is in hospice. The family did concur with the assessment. Patient discharged in good condition, without signs of systemic symptoms.     Diagnosis:    ICD-10-CM   1. Cellulitis of right lower extremity L03.115   2. Pain of toe of right foot M79.674     Disposition:  Discharge to TCU with primary care follow up.    Discharge Medications:   PIPERACILLIN SOD-TAZOBACTAM 40.5 (36-4.5) G SOLR    Inject 16.88 mLs (3.375 g) into the vein 4 times daily for 10 days     Patrick PEARCE, dorian serving as a scribe on 5/11/2017 at 10:56 AM to personally document services performed by Sal Eugene MD, based on my observations and the provider's statements to me.    [facility] EMERGENCY DEPARTMENT       Sal Eugene MD  05/12/17 2541

## 2017-05-12 NOTE — PROGRESS NOTES
[facility]  PRIMARY CARE PROVIDER AND CLINIC:  Nidia Mitchell [facility] CARE [address] / [place]*  Chief Complaint   Patient presents with     Hospital F/U       HPI:    Karan Vaz is a 84 year old  (10/7/1932),admitted to the [facility] from Emergency Room  [facility]. Emergency Department visit on 5/11/17.  Admitted to this facility for  rehab, medical management, nursing care and hospice. :          ED Course:     Karan Vaz is a 84 year old male who was seen previously on 5/3 by me, and sutured his foot after a fall. At that time he was admitted to the hospital. Patient says he has developed redness over foot over past several days, and has cellulitis.The area does not extend around the area of the laceration, which looks quite good. There is some adjacent swelling. I thought this could be cellulitis. I also considered retained foreign body, and/or fracture. X-ray was ordered. There was some question about osteomyelitis, therefore MRI was ordered. I discussed the patient with Dr. Huizar of orthopedics, who did not feel debridement was required . The patient s white count is mildly elevated. He is quite comfortable and not showing systemic symptoms. PICC line was placed. It was arranged for patient to receive IV antibiotics at his facility. He is in hospice. The family did concur with the assessment. Patient discharged in good condition, without signs of systemic symptoms    ----------------------  Patient found in room. Alert, calm, NAD. Reports pain to right LE/foot lessened today. Reports breathing easier the last couple of days. Denies SOB, chest pain, fever or chills. Wife Britt present and expressed concern about discharge plan as not certain she can take care of patient at home anymore. Discussion with patient and Britt regarding rehab course and d/c planning. Patient had a care conference sched yesterday but  this was canceled 2/2 ER visit. SS updated.       CODE STATUS/ADVANCE DIRECTIVES DISCUSSION:   DNR / DNI  Patient's living condition: lives with spouse    ALLERGIES:No known allergies  PAST MEDICAL HISTORY:  has a past medical history of Bladder neck obstruction (4/10/2007); Chronic airway obstruction, not elsewhere classified (1/27/2012); Chronic insomnia; elastofibroma thoracic wall, benign (1/9/2007); Gastro-oesophageal reflux disease; Moderate persistent asthma; Myoclonus; Restless legs syndrome (RLS) (3/8/2010); and Sensory neuropathy (H) (6/8/2011). He also has no past medical history of Chronic infection or PONV (postoperative nausea and vomiting).  PAST SURGICAL HISTORY:  has a past surgical history that includes SHOULDER SURG PROC UNLISTED; SHOULDER SURG PROC UNLISTED; SHOULDER SURG PROC UNLISTED; REVISE MEDIAN N/CARPAL TUNNEL SURG (7/01/02); REVISE MEDIAN N/CARPAL TUNNEL SURG (09/24/02); Arthroplasty hip bilateral; Arthroscopy shoulder distal clavicle repair (5/30/2012); ost. microwave thermotx (2012); and Sigmoidoscopy flexible (N/A, 2/16/2016).  FAMILY HISTORY: family history is negative for Colon Cancer.  SOCIAL HISTORY:  reports that he quit smoking about 27 years ago. He has never used smokeless tobacco. He reports that he does not drink alcohol or use illicit drugs.    Post Discharge Medication Reconciliation Status: discharge medications reconciled and changed, per note/orders (see AVS).  Current Outpatient Prescriptions   Medication Sig Dispense Refill     Simethicone 180 MG CAPS Take 180 mg by mouth 2 times daily       piperacillin sod-tazobactam 40.5 (36-4.5) G SOLR Inject 16.88 mLs (3.375 g) into the vein 4 times daily for 10 days 675.2 mL 0     TRAMADOL HCL PO Take 25 mg by mouth nightly as needed for moderate to severe pain       methadone (DOLOPHINE) 5 MG tablet Take 0.5 tablets (2.5 mg) by mouth every 12 hours 10 tablet 0     oxyCODONE (ROXICODONE) 5 MG IR tablet Take 0.5-1 tablets (2.5-5  mg) by mouth every 3 hours as needed for moderate to severe pain 20 tablet 0     hydrOXYzine (ATARAX) 25 MG tablet Take 1 tablet (25 mg) by mouth every 6 hours as needed for other (adjuvant pain) 20 tablet 0     hypromellose-dextran 0.3-0.1% (ARTIFICIAL TEARS) opthalmic solution Place 1 drop into both eyes every hour as needed       atropine 1 % ophthalmic solution Take 2-4 drops by mouth every 2 hours as needed for secretions       bisacodyl (DULCOLAX) 10 MG Suppository Place 10 mg rectally 2 times daily as needed for constipation       ipratropium - albuterol 0.5 mg/2.5 mg/3 mL (DUONEB) 0.5-2.5 (3) MG/3ML neb solution Take 1 vial by nebulization 4 times daily       ipratropium - albuterol 0.5 mg/2.5 mg/3 mL (DUONEB) 0.5-2.5 (3) MG/3ML neb solution Take 1 vial by nebulization every 2 hours as needed for shortness of breath / dyspnea or wheezing       polyethylene glycol (MIRALAX/GLYCOLAX) Packet Take 17 g by mouth daily as needed for constipation       predniSONE (DELTASONE) 10 MG tablet Take 10 mg by mouth daily       DULoxetine (CYMBALTA) 60 MG EC capsule TAKE ONE CAPSULE BY MOUTH ONE TIME DAILY  90 capsule 0     gabapentin (NEURONTIN) 300 MG capsule TAKE TWO CAPSULES BY MOUTH TWICE DAILY  120 capsule 0     tamsulosin (FLOMAX) 0.4 MG capsule Take 1 capsule (0.4 mg) by mouth daily 90 capsule 0     senna-docusate (SENOKOT-S;PERICOLACE) 8.6-50 MG per tablet Take 4 tablets by mouth 2 times daily  100 tablet 3     metoprolol (LOPRESSOR) 25 MG tablet TAKE HALF TABLET BY MOUTH TWICE DAILY  90 tablet 0     albuterol (2.5 MG/3ML) 0.083% neb solution INHALE 1 VIAL BY NEBULIZATION EVERY 2 HOURS AS NEEDED FOR DYSPNEA 540 mL 0     acetaminophen (MAPAP) 500 MG tablet Take 2 tablets (1,000 mg) by mouth 2 times daily 240 tablet 0     lidocaine (XYLOCAINE) 5 % ointment Apply topically 3 times daily as needed for moderate pain 50 g 1     omeprazole (PRILOSEC) 20 MG CR capsule TAKE ONE CAPSULE BY MOUTH ONE TIME DAILY  30 capsule 0  "    torsemide (DEMADEX) 10 MG tablet Take 1 tablet (10 mg) by mouth daily 30 tablet 1     budesonide (PULMICORT) 0.5 MG/2ML neb solution Take 2 mLs (0.5 mg) by nebulization 2 times daily 60 mL 3     buPROPion (WELLBUTRIN XL) 150 MG 24 hr tablet TAKE ONE TABLET BY MOUTH EVERY NIGHT AT BEDTIME 90 tablet 0     montelukast (SINGULAIR) 10 MG tablet TAKE ONE TABLET BY MOUTH EVERY NIGHT AT BEDTIME 90 tablet 0     albuterol (PROAIR HFA, PROVENTIL HFA, VENTOLIN HFA) 108 (90 BASE) MCG/ACT inhaler Inhale 2 puffs into the lungs every 6 hours as needed for shortness of breath / dyspnea 1 Inhaler 6     order for DME Equipment being ordered: Oxygen Titration visit [facility]  Fax # [phone]  0       ROS:  4 point ROS including Respiratory, CV, GI and , other than that noted in the HPI,  is negative    Exam:  /84  Pulse 87  Temp 97.9  F (36.6  C)  Resp 20  Ht 5' 5\" (1.651 m)  Wt 230 lb (104.3 kg)  SpO2 98%  BMI 38.27 kg/m2    Resp: Effort WNL, LS diminished throughout. Supplemental O2 in place per NC  CV: S1-2, no S3-4, no murmurs noted- LE edema 1++ > right  Abd- soft, nontender, BS +  Musc- YADAV  Skin- edema present right foot and LE, erythema top of right foot - small lacerated area medial to 5th phalange- no drainage noted.  Foot slight warm to touch.   Psych- alert, calm, pleasant      Lab/Diagnostic data:    CBC RESULTS:   Recent Labs   Lab Test  05/11/17   1115  05/03/17   0915   WBC  13.7*  8.7   RBC  3.67*  4.05*   HGB  10.8*  11.8*   HCT  34.5*  37.9*   MCV  94  94   MCH  29.4  29.1   MCHC  31.3*  31.1*   RDW  14.2  14.1   PLT  369  291       Last Basic Metabolic Panel:  Recent Labs   Lab Test  05/11/17   1115  05/03/17   0915   NA  137  138   POTASSIUM  4.3  4.2   CHLORIDE  98  97   GITA  9.2  9.3   CO2  36*  34*   BUN  21  25   CR  0.89  0.85   GLC  173*  124*       Liver Function Studies -   Recent Labs   Lab Test  05/03/17   0915  10/11/16   1620   PROTTOTAL  7.4  8.2   ALBUMIN  3.8  4.0 "   BILITOTAL  0.3  0.4   ALKPHOS  88  107   AST  27  33   ALT  23  23       TSH   Date Value Ref Range Status   05/14/2014 1.15 0.40 - 4.50 mIU/L Final   03/30/2011 0.86 0.40 - 4.50 mIU/L Final     Comment:     Test Performed at:  [facility]  [address] [place]  [address]  JAME OROSCO MD       Lab Results   Component Value Date    A1C 6.3 02/19/2016    A1C 5.4 04/07/2013       ASSESSMENT/PLAN:  Right LE cellulitis  Rt 5th toe fx  Right foot laceration  - PIC line cares/assess per IV pharm protocol  - Zosyn QID x 10 days  - daily assessment RLE/clean and wrap with kerlix  - recheck CBC     Acute on chronic diastolic congestive heart failure (H)  Bilateral edema of lower extremity  Weight up and increased SOB this week so Torsemide to 20 mg x two days with resultant decrease back down to 225 from 230 and increased ease of resp  - continue on metoprolol  - continue to follow VS, weights     Chronic obstructive pulmonary disease, unspecified COPD type (H)  - continue on home nebs and O2 regimen. Prednisone   - hospice as of 3 months ago - but rescinded 2/2 fall and desire to do rehab     Slow transit constipation  - continue on sched Senna S and prn bisacodyl  - follow clinically      Information reviewed:  Medications, vital signs, orders, nursing notes, problem list, hospital information. Total time spent with patient visit was 45 min including patient visit and review of past records. Greater than 50% of total time spent with counseling and coordinating care.    Electronically signed by:  EMI Prasad CNP

## 2017-05-14 PROBLEM — S92.501A CLOSED FRACTURE OF PHALANX OF RIGHT FIFTH TOE: Status: ACTIVE | Noted: 2017-01-01

## 2017-05-15 NOTE — PROGRESS NOTES
[place] [facility]    Chief Complaint   Patient presents with     RECHECK       HPI:    Karan Vaz is a 84 year old  (10/7/1932), who is being seen today for an episodic care visit at [facility]. Today's concern is:     Cellulitis of right lower extremity  Acute on chronic diastolic congestive heart failure (H)  Bilateral edema of lower extremity  Chronic obstructive pulmonary disease, unspecified COPD type (H)  Slow transit constipation  Physical deconditioning     Patient reports lessening of pain to foot and breathing improved to baseline. Working with therapy. VS reviewed    ALLERGIES: No known allergies  Past Medical, Surgical, Family and Social History reviewed and updated in EPIC.    Current Outpatient Prescriptions   Medication Sig Dispense Refill     Simethicone 180 MG CAPS Take 180 mg by mouth 2 times daily       piperacillin sod-tazobactam 40.5 (36-4.5) G SOLR Inject 16.88 mLs (3.375 g) into the vein 4 times daily for 10 days 675.2 mL 0     TRAMADOL HCL PO Take 25 mg by mouth nightly as needed for moderate to severe pain       methadone (DOLOPHINE) 5 MG tablet Take 0.5 tablets (2.5 mg) by mouth every 12 hours 10 tablet 0     oxyCODONE (ROXICODONE) 5 MG IR tablet Take 0.5-1 tablets (2.5-5 mg) by mouth every 3 hours as needed for moderate to severe pain 20 tablet 0     hydrOXYzine (ATARAX) 25 MG tablet Take 1 tablet (25 mg) by mouth every 6 hours as needed for other (adjuvant pain) 20 tablet 0     hypromellose-dextran 0.3-0.1% (ARTIFICIAL TEARS) opthalmic solution Place 1 drop into both eyes every hour as needed       atropine 1 % ophthalmic solution Take 2-4 drops by mouth every 2 hours as needed for secretions       bisacodyl (DULCOLAX) 10 MG Suppository Place 10 mg rectally 2 times daily as needed for constipation       ipratropium - albuterol 0.5 mg/2.5 mg/3 mL (DUONEB) 0.5-2.5 (3) MG/3ML neb solution Take 1 vial by nebulization 4 times daily       ipratropium -  albuterol 0.5 mg/2.5 mg/3 mL (DUONEB) 0.5-2.5 (3) MG/3ML neb solution Take 1 vial by nebulization every 2 hours as needed for shortness of breath / dyspnea or wheezing       polyethylene glycol (MIRALAX/GLYCOLAX) Packet Take 17 g by mouth daily as needed for constipation       predniSONE (DELTASONE) 10 MG tablet Take 10 mg by mouth daily       DULoxetine (CYMBALTA) 60 MG EC capsule TAKE ONE CAPSULE BY MOUTH ONE TIME DAILY  90 capsule 0     gabapentin (NEURONTIN) 300 MG capsule TAKE TWO CAPSULES BY MOUTH TWICE DAILY  120 capsule 0     tamsulosin (FLOMAX) 0.4 MG capsule Take 1 capsule (0.4 mg) by mouth daily 90 capsule 0     senna-docusate (SENOKOT-S;PERICOLACE) 8.6-50 MG per tablet Take 4 tablets by mouth 2 times daily  100 tablet 3     metoprolol (LOPRESSOR) 25 MG tablet TAKE HALF TABLET BY MOUTH TWICE DAILY  90 tablet 0     albuterol (2.5 MG/3ML) 0.083% neb solution INHALE 1 VIAL BY NEBULIZATION EVERY 2 HOURS AS NEEDED FOR DYSPNEA 540 mL 0     acetaminophen (MAPAP) 500 MG tablet Take 2 tablets (1,000 mg) by mouth 2 times daily 240 tablet 0     lidocaine (XYLOCAINE) 5 % ointment Apply topically 3 times daily as needed for moderate pain 50 g 1     omeprazole (PRILOSEC) 20 MG CR capsule TAKE ONE CAPSULE BY MOUTH ONE TIME DAILY  30 capsule 0     torsemide (DEMADEX) 10 MG tablet Take 1 tablet (10 mg) by mouth daily 30 tablet 1     budesonide (PULMICORT) 0.5 MG/2ML neb solution Take 2 mLs (0.5 mg) by nebulization 2 times daily 60 mL 3     buPROPion (WELLBUTRIN XL) 150 MG 24 hr tablet TAKE ONE TABLET BY MOUTH EVERY NIGHT AT BEDTIME 90 tablet 0     montelukast (SINGULAIR) 10 MG tablet TAKE ONE TABLET BY MOUTH EVERY NIGHT AT BEDTIME 90 tablet 0     albuterol (PROAIR HFA, PROVENTIL HFA, VENTOLIN HFA) 108 (90 BASE) MCG/ACT inhaler Inhale 2 puffs into the lungs every 6 hours as needed for shortness of breath / dyspnea 1 Inhaler 6     order for DME Equipment being ordered: Oxygen Titration visit [facility]  Fax #  "[phone]  0     Medications reviewed:  Medications reconciled to facility chart and changes were made to reflect current medications as identified as above med list. Below are the changes that were made:   Medications stopped since last EPIC medication reconciliation:   There are no discontinued medications.    Medications started since last [facility] medication reconciliation:  No orders of the defined types were placed in this encounter.      REVIEW OF SYSTEMS:  4 point ROS including Respiratory, CV, GI and , other than that noted in the HPI,  is negative    Physical Exam:  /71  Pulse 74  Temp 98.2  F (36.8  C)  Resp 21  Ht 5' 5\" (1.651 m)  Wt 233 lb 3.2 oz (105.8 kg)  SpO2 98%  BMI 38.81 kg/m2    Resp: Effort WNL, LS diminished - supplemental O2  CV: S1-2, no S3-4, no murmurs noted- LE edema 1++ > right  Abd- soft, nontender, BS +  Musc- YADAV  Skin- right foot and 4-5 toe with less erythema and swelling.  Psych- alert, calm, pleasant      Recent Labs:   CBC RESULTS:   Recent Labs   Lab Test  05/15/17   0700  05/11/17   1115   WBC  11.3*  13.7*   RBC  3.73*  3.67*   HGB  10.8*  10.8*   HCT  35.1*  34.5*   MCV  94  94   MCH  29.0  29.4   MCHC  30.8*  31.3*   RDW  14.4  14.2   PLT  414  369       Last Basic Metabolic Panel:  Recent Labs   Lab Test  05/15/17   0700  05/12/17   0600   NA  140  137   POTASSIUM  4.0  4.3   CHLORIDE  98  98   GITA  9.0  9.1   CO2  35*  34*   BUN  17  22   CR  0.98  0.86   GLC  108*  110*       Liver Function Studies -   Recent Labs   Lab Test  05/03/17   0915  10/11/16   1620   PROTTOTAL  7.4  8.2   ALBUMIN  3.8  4.0   BILITOTAL  0.3  0.4   ALKPHOS  88  107   AST  27  33   ALT  23  23       TSH   Date Value Ref Range Status   05/14/2014 1.15 0.40 - 4.50 mIU/L Final   03/30/2011 0.86 0.40 - 4.50 mIU/L Final     Comment:     Test Performed at:  [facility] [place]  [address] [place]  [address]  JAME OROSCO MD       Lab Results   Component " Value Date    A1C 6.3 02/19/2016    A1C 5.4 04/07/2013       Assessment/Plan:       Right LE cellulitis  Rt 5th toe fx  Right foot laceration  - improving  - PIC line cares/assess per IV pharm protocol  - Zosyn QID x 10 days  - daily assessment RLE/clean and wrap with kerlix  - recheck CBC      Acute on chronic diastolic congestive heart failure (H)  Bilateral edema of lower extremity  Weight up and increased SOB this week so Torsemide to 20 mg x two days with resultant decrease back down to 225 from 230 and increased ease of resp  - continue on metoprolol  - continue to follow VS, weights      Chronic obstructive pulmonary disease, unspecified COPD type (H)  - chronic- stable  - continue on home nebs and O2 regimen. Prednisone   - hospice as of 3 months ago - but rescinded 2/2 fall and desire to do rehab      Slow transit constipation  - continue on sched Senna S and prn bisacodyl  - follow clinically        Electronically signed by  EMI Prasad CNP

## 2017-05-17 NOTE — PROGRESS NOTES
[place] [facility]    Chief Complaint   Patient presents with     Wound Check       HPI:    Karan Vaz is a 84 year old  (10/7/1932), who is being seen today for an episodic care visit at [facility]. Today's concern is:     Acute on chronic diastolic congestive heart failure (H)  Visit for wound check  Foot laceration, right, subsequent encounter  Cellulitis of right foot*    Weight trending up again, reports some worsening SOB/MUNSON- on home O2, denies cx pain.    Per nsg appears not all sutures removed from right foot/5th toe lac. Writer removed remaining sutures. Some maceration in fold noted. Cleansed and applied betadine. Patient tolerated. Overall erythema to right foot much less. Patient continues on IV Zosyn course.         ALLERGIES: No known allergies  Past Medical, Surgical, Family and Social History reviewed and updated in EPIC.    Current Outpatient Prescriptions   Medication Sig Dispense Refill     Simethicone 180 MG CAPS Take 180 mg by mouth 2 times daily       piperacillin sod-tazobactam 40.5 (36-4.5) G SOLR Inject 16.88 mLs (3.375 g) into the vein 4 times daily for 10 days 675.2 mL 0     TRAMADOL HCL PO Take 25 mg by mouth nightly as needed for moderate to severe pain       methadone (DOLOPHINE) 5 MG tablet Take 0.5 tablets (2.5 mg) by mouth every 12 hours 10 tablet 0     oxyCODONE (ROXICODONE) 5 MG IR tablet Take 0.5-1 tablets (2.5-5 mg) by mouth every 3 hours as needed for moderate to severe pain 20 tablet 0     hydrOXYzine (ATARAX) 25 MG tablet Take 1 tablet (25 mg) by mouth every 6 hours as needed for other (adjuvant pain) 20 tablet 0     hypromellose-dextran 0.3-0.1% (ARTIFICIAL TEARS) opthalmic solution Place 1 drop into both eyes every hour as needed       atropine 1 % ophthalmic solution Take 2-4 drops by mouth every 2 hours as needed for secretions       bisacodyl (DULCOLAX) 10 MG Suppository Place 10 mg rectally 2 times daily as needed for  constipation       ipratropium - albuterol 0.5 mg/2.5 mg/3 mL (DUONEB) 0.5-2.5 (3) MG/3ML neb solution Take 1 vial by nebulization 4 times daily       ipratropium - albuterol 0.5 mg/2.5 mg/3 mL (DUONEB) 0.5-2.5 (3) MG/3ML neb solution Take 1 vial by nebulization every 2 hours as needed for shortness of breath / dyspnea or wheezing       polyethylene glycol (MIRALAX/GLYCOLAX) Packet Take 17 g by mouth daily as needed for constipation       predniSONE (DELTASONE) 10 MG tablet Take 10 mg by mouth daily       DULoxetine (CYMBALTA) 60 MG EC capsule TAKE ONE CAPSULE BY MOUTH ONE TIME DAILY  90 capsule 0     gabapentin (NEURONTIN) 300 MG capsule TAKE TWO CAPSULES BY MOUTH TWICE DAILY  120 capsule 0     tamsulosin (FLOMAX) 0.4 MG capsule Take 1 capsule (0.4 mg) by mouth daily 90 capsule 0     senna-docusate (SENOKOT-S;PERICOLACE) 8.6-50 MG per tablet Take 4 tablets by mouth 2 times daily  100 tablet 3     metoprolol (LOPRESSOR) 25 MG tablet TAKE HALF TABLET BY MOUTH TWICE DAILY  90 tablet 0     albuterol (2.5 MG/3ML) 0.083% neb solution INHALE 1 VIAL BY NEBULIZATION EVERY 2 HOURS AS NEEDED FOR DYSPNEA 540 mL 0     acetaminophen (MAPAP) 500 MG tablet Take 2 tablets (1,000 mg) by mouth 2 times daily 240 tablet 0     lidocaine (XYLOCAINE) 5 % ointment Apply topically 3 times daily as needed for moderate pain 50 g 1     omeprazole (PRILOSEC) 20 MG CR capsule TAKE ONE CAPSULE BY MOUTH ONE TIME DAILY  30 capsule 0     torsemide (DEMADEX) 10 MG tablet Take 1 tablet (10 mg) by mouth daily 30 tablet 1     budesonide (PULMICORT) 0.5 MG/2ML neb solution Take 2 mLs (0.5 mg) by nebulization 2 times daily 60 mL 3     buPROPion (WELLBUTRIN XL) 150 MG 24 hr tablet TAKE ONE TABLET BY MOUTH EVERY NIGHT AT BEDTIME 90 tablet 0     montelukast (SINGULAIR) 10 MG tablet TAKE ONE TABLET BY MOUTH EVERY NIGHT AT BEDTIME 90 tablet 0     albuterol (PROAIR HFA, PROVENTIL HFA, VENTOLIN HFA) 108 (90 BASE) MCG/ACT inhaler Inhale 2 puffs into the lungs  "every 6 hours as needed for shortness of breath / dyspnea 1 Inhaler 6     order for DME Equipment being ordered: Oxygen Titration visit [facility]  Fax # [phone]  0     Medications reviewed:  Medications reconciled to facility chart and changes were made to reflect current medications as identified as above med list. Below are the changes that were made:   Medications stopped since last EPIC medication reconciliation:   There are no discontinued medications.    Medications started since last [facility] medication reconciliation:  No orders of the defined types were placed in this encounter.      REVIEW OF SYSTEMS:  4 point ROS including Respiratory, CV, GI and , other than that noted in the HPI,  is negative    Physical Exam:  /74  Pulse 76  Temp 97.4  F (36.3  C)  Resp 20  Ht 5' 5\" (1.651 m)  Wt 233 lb 3.2 oz (105.8 kg)  SpO2 95%  BMI 38.81 kg/m2    Resp: Effort WNL, LS diminished throughout  CV: VS as above, LE edema 1-2 + > right  Abd- soft, nontender, BS +  Musc- YADAV  Psych- alert, calm, pleasant      Recent Labs:   CBC RESULTS:   Recent Labs   Lab Test  05/15/17   0700  05/11/17   1115   WBC  11.3*  13.7*   RBC  3.73*  3.67*   HGB  10.8*  10.8*   HCT  35.1*  34.5*   MCV  94  94   MCH  29.0  29.4   MCHC  30.8*  31.3*   RDW  14.4  14.2   PLT  414  369       Last Basic Metabolic Panel:  Recent Labs   Lab Test  05/15/17   0700  05/12/17   0600   NA  140  137   POTASSIUM  4.0  4.3   CHLORIDE  98  98   GITA  9.0  9.1   CO2  35*  34*   BUN  17  22   CR  0.98  0.86   GLC  108*  110*       Liver Function Studies -   Recent Labs   Lab Test  05/03/17   0915  10/11/16   1620   PROTTOTAL  7.4  8.2   ALBUMIN  3.8  4.0   BILITOTAL  0.3  0.4   ALKPHOS  88  107   AST  27  33   ALT  23  23       TSH   Date Value Ref Range Status   05/14/2014 1.15 0.40 - 4.50 mIU/L Final   03/30/2011 0.86 0.40 - 4.50 mIU/L Final     Comment:     Test Performed at:  [facility] [place]  [address]  [place], " [place]  [address]  JAME OROSCO MD     Lab Results   Component Value Date    A1C 6.3 02/19/2016    A1C 5.4 04/07/2013       Assessment/Plan:  Acute on chronic diastolic congestive heart failure (H)  - increase Torsemide to 10/20 mg QOD  - continue current med/tx and monitoring    Visit for wound check  Foot laceration, right, subsequent encounter  - healing  - sutures out, slight macerated, keep folds clean and dry- monitor    Cellulitis of right foot  - improving  - complete abx course, continue current monitoring/tx  - repeat CBC          Electronically signed by  EMI Prasad CNP

## 2017-05-19 NOTE — PROGRESS NOTES
Chart review. Pt is in [facility] and is receiving services through [facility]. Patient will be closed to care coordination at this time and can be referred upon resuming services with [facility].    ELIZABETH Velasquez  Social Work Care Coordinator  [facility], Mobile Team

## 2017-05-21 NOTE — PROGRESS NOTES
[place] [facility] DISCHARGE SUMMARY    PATIENT'S NAME: Karan Vaz  YOB: 1932  MEDICAL RECORD NUMBER:  [id number]    PRIMARY CARE PROVIDER AND CLINIC RESPONSIBLE AFTER TRANSFER: Nidia Mitchell [facility]ARE [address] / [place]*     CODE STATUS/ADVANCE DIRECTIVES DISCUSSION:   DNR / DNI       Allergies   Allergen Reactions     No Known Allergies        TRANSFERRING PROVIDERS: Elsi Elias MD  DATE OF SNF ADMISSION:  May / 04 / 2017  DATE OF SNF (anticipated) DISCHARGE: May / 25 / 2017  DISCHARGE DISPOSITION: FM Provider   Nursing Facility: [facility] stay 5/3/17 to 5/4/17.     Condition on Discharge:  Improving.  Function:  Transfers and Ambulates SBA <50'   Cognitive Scores: unavailable  Equipment: walker    DISCHARGE DIAGNOSIS:   1. Cellulitis of right lower extremity    2. Foot laceration, right, subsequent encounter    3. Acute on chronic diastolic congestive heart failure (H)    4. Bilateral edema of lower extremity    5. Chronic obstructive pulmonary disease, unspecified COPD type (H)    6. Slow transit constipation    7. Physical deconditioning        TCU Facility Course:  Admitted to [facility] TCU following hospitalization as above for right knee pain, end stage COPD, and CHF.  Please see TCU admit note of 5/5/17 for further details regarding hospitalization.      Issues addressed in TCU:     Right LE cellulitis  Rt 5th toe fx  Right foot laceration  - improving  - PIC line cares/assess per IV pharm protocol  - Zosyn QID x 10 days - done 5/24  - daily assessment RLE/clean and wrap with kerlix  - recheck CBC 5/25      Acute on chronic diastolic congestive heart failure (H)  Bilateral edema of lower extremity  Weight up and some intermittent SOB, edema. Increased Torsemide from 10 - 20 mg daily with some improviment- continue on metoprolol  - follow clinically  -  hospice      Chronic obstructive pulmonary disease, unspecified COPD type (H)  - chronic- stable  - continue on home nebs and O2 regimen. 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 He also has no past medical history of Chronic infection or PONV (postoperative nausea and vomiting).    DISCHARGE MEDICATIONS:  Current Outpatient Prescriptions   Medication Sig Dispense Refill     Simethicone 180 MG CAPS Take 180 mg by mouth 2 times daily       TRAMADOL HCL PO Take 25 mg by mouth nightly as needed for moderate to severe pain       methadone (DOLOPHINE) 5 MG tablet Take 0.5 tablets (2.5 mg) by mouth every 12 hours 10 tablet 0     oxyCODONE (ROXICODONE) 5 MG IR tablet Take 0.5-1 tablets (2.5-5 mg) by mouth every 3 hours as needed for moderate to severe pain 20 tablet 0     hydrOXYzine (ATARAX) 25 MG tablet Take 1 tablet (25 mg) by mouth every 6 hours as needed for other (adjuvant pain) 20 tablet 0     hypromellose-dextran 0.3-0.1% (ARTIFICIAL TEARS) opthalmic solution Place 1 drop into both eyes every hour as needed       atropine 1 % ophthalmic solution Take 2-4 drops by mouth every 2 hours as needed for secretions       bisacodyl (DULCOLAX) 10 MG Suppository Place 10 mg rectally 2 times daily as needed for constipation       ipratropium - albuterol 0.5 mg/2.5 mg/3 mL (DUONEB) 0.5-2.5 (3) MG/3ML neb solution Take 1 vial by nebulization 4 times daily       ipratropium - albuterol 0.5 mg/2.5 mg/3 mL (DUONEB) 0.5-2.5 (3) MG/3ML neb  solution Take 1 vial by nebulization every 2 hours as needed for shortness of breath / dyspnea or wheezing       polyethylene glycol (MIRALAX/GLYCOLAX) Packet Take 17 g by mouth daily as needed for constipation       predniSONE (DELTASONE) 10 MG tablet Take 10 mg by mouth daily       DULoxetine (CYMBALTA) 60 MG EC capsule TAKE ONE CAPSULE BY MOUTH ONE TIME DAILY  90 capsule 0     gabapentin (NEURONTIN) 300 MG capsule TAKE TWO CAPSULES BY MOUTH TWICE DAILY  120 capsule 0     tamsulosin (FLOMAX) 0.4 MG capsule Take 1 capsule (0.4 mg) by mouth daily 90 capsule 0     senna-docusate (SENOKOT-S;PERICOLACE) 8.6-50 MG per tablet Take 4 tablets by mouth 2 times daily  100 tablet 3     metoprolol (LOPRESSOR) 25 MG tablet TAKE HALF TABLET BY MOUTH TWICE DAILY  90 tablet 0     albuterol (2.5 MG/3ML) 0.083% neb solution INHALE 1 VIAL BY NEBULIZATION EVERY 2 HOURS AS NEEDED FOR DYSPNEA 540 mL 0     acetaminophen (MAPAP) 500 MG tablet Take 2 tablets (1,000 mg) by mouth 2 times daily 240 tablet 0     lidocaine (XYLOCAINE) 5 % ointment Apply topically 3 times daily as needed for moderate pain 50 g 1     omeprazole (PRILOSEC) 20 MG CR capsule TAKE ONE CAPSULE BY MOUTH ONE TIME DAILY  30 capsule 0     torsemide (DEMADEX) 10 MG tablet Take 1 tablet (10 mg) by mouth daily 30 tablet 1     budesonide (PULMICORT) 0.5 MG/2ML neb solution Take 2 mLs (0.5 mg) by nebulization 2 times daily 60 mL 3     buPROPion (WELLBUTRIN XL) 150 MG 24 hr tablet TAKE ONE TABLET BY MOUTH EVERY NIGHT AT BEDTIME 90 tablet 0     montelukast (SINGULAIR) 10 MG tablet TAKE ONE TABLET BY MOUTH EVERY NIGHT AT BEDTIME 90 tablet 0     albuterol (PROAIR HFA, PROVENTIL HFA, VENTOLIN HFA) 108 (90 BASE) MCG/ACT inhaler Inhale 2 puffs into the lungs every 6 hours as needed for shortness of breath / dyspnea 1 Inhaler 6     order for DME Equipment being ordered: Oxygen Titration visit [facility]  Fax # [phone]  0       MEDICATION CHANGES/RATIONALE:   As  "above    Controlled medications sent with patient: Medication: methadone - send one day supply then per home script via hospice , oxycodone 5 mg  tabs given to patient at the time of discharge to take home, Medication: send 30 tabs , Tramadol 25 mg  tabs given to patient at the time of discharge to take home and Medication: send 30 tabs , -- tabs given to patient at the time of discharge to take home     ROS:    4 point ROS including Respiratory, CV, GI and , other than that noted in the HPI,  is negative    Physical Exam:   Vitals: /76  Pulse 79  Temp 98  F (36.7  C)  Resp 20  Ht 5' 5\" (1.651 m)  Wt 213 lb 6.4 oz (96.8 kg)  SpO2 96%  BMI 35.51 kg/m2  BMI= Body mass index is 35.51 kg/(m^2).    Resp: Effort WNL, LS decreased throughout - O2 3LNC  CV: S1-2, no S3-4, no murmurs noted- 2+ edema, > right  Abd- soft, nontender, BS +  Musc- YADVA  Skin - laceration to foot healed- skin dry, erythema to foot much lessened  Psych- alert, calm, pleasant      DISCHARGE PLAN:  Hospice, Occupational Therapy, Physical Therapy, Registered Nurse, Home Health Aide and From:  [facility]  Patient instructed to follow-up with:  PCP in 7 days      Current [facility] scheduled appointments:  Future Appointments  Date Time Provider Department Center          6/9/2017 9:45 AM Nidia Mitchell APRN CNP [facility]       MTM referral needed and placed by this provider: No    Pending labs:      labs  CBC RESULTS:   Recent Labs   Lab Test  05/18/17   0635  05/15/17   0700   WBC  9.9  11.3*   RBC  3.70*  3.73*   HGB  10.8*  10.8*   HCT  34.3*  35.1*   MCV  93  94   MCH  29.2  29.0   MCHC  31.5  30.8*   RDW  14.4  14.4   PLT  395  414       Last Basic Metabolic Panel:  Recent Labs   Lab Test  05/18/17   0635  05/15/17   0700   NA  139  140   POTASSIUM  4.3  4.0   CHLORIDE  101  98   GITA  9.0  9.0   CO2  32  35*   BUN  14  17   CR  0.79  0.98   GLC  107*  108*       Liver Function Studies -   Recent Labs   Lab Test  " 05/03/17   0915  10/11/16   1620   PROTTOTAL  7.4  8.2   ALBUMIN  3.8  4.0   BILITOTAL  0.3  0.4   ALKPHOS  88  107   AST  27  33   ALT  23  23       TSH   Date Value Ref Range Status   05/14/2014 1.15 0.40 - 4.50 mIU/L Final   03/30/2011 0.86 0.40 - 4.50 mIU/L Final     Comment:     Test Performed at:  [facility] [address]  [address]  JAME OROSCO MD       Lab Results   Component Value Date    A1C 6.3 02/19/2016    A1C 5.4 04/07/2013     Discharge Treatments:as above    TOTAL DISCHARGE TIME:   Greater than 30 minutes  Electronically signed by:  EMI Prasad CNP

## 2017-05-24 NOTE — PROGRESS NOTES
[facility]    Chief Complaint   Patient presents with     Fall     RECHECK       HPI:    Karan Vaz is a 84 year old  (10/7/1932), who is being seen today for an episodic care visit at [facility].  HPI information obtained from: facility chart records.Today's concern is:     Fall, initial encounter  Acute pain of left shoulder  Lip laceration, initial encounter     Patient attempting to self transfer and fell on to floor on left side. Small laceration to lip and just below nose bleeding. Patient lifted to bed with Aidan. ROM all extremities is baseline. Lip lac cleaned and bandaid applied. Reports some left shoulder pain, again, ROM/CMS intact and no evidence new bruising or open areas. VSS.     Wife now concerned may not be able to manage patient at home with services as they are lined up. Mini care conference convened with SS, wife, patient, daughter and home care. Discharge plan reviewed and home care will be increased to 24/7 ([facility] was the recommendation).         BP 5/17-5/24: 111-174/64-83mmHg  Weight 5/17-5/24: 228.2lbs - 235.6lbs     ALLERGIES: No known allergies  Past Medical, Surgical, Family and Social History reviewed and updated in [facility].    Current Outpatient Prescriptions   Medication Sig Dispense Refill     torsemide (DEMADEX) 10 MG tablet Take 2 tablets (20 mg) by mouth daily 30 tablet 1     Simethicone 180 MG CAPS Take 180 mg by mouth 2 times daily       TRAMADOL HCL PO Take 25 mg by mouth nightly as needed for moderate to severe pain       methadone (DOLOPHINE) 5 MG tablet Take 0.5 tablets (2.5 mg) by mouth every 12 hours 10 tablet 0     oxyCODONE (ROXICODONE) 5 MG IR tablet Take 0.5-1 tablets (2.5-5 mg) by mouth every 3 hours as needed for moderate to severe pain 20 tablet 0     hydrOXYzine (ATARAX) 25 MG tablet Take 1 tablet (25 mg) by mouth every 6 hours as needed for other (adjuvant pain) 20 tablet 0     hypromellose-dextran 0.3-0.1%  (ARTIFICIAL TEARS) opthalmic solution Place 1 drop into both eyes every hour as needed       atropine 1 % ophthalmic solution Take 2-4 drops by mouth every 2 hours as needed for secretions       bisacodyl (DULCOLAX) 10 MG Suppository Place 10 mg rectally daily as needed for constipation        ipratropium - albuterol 0.5 mg/2.5 mg/3 mL (DUONEB) 0.5-2.5 (3) MG/3ML neb solution Take 1 vial by nebulization 4 times daily       ipratropium - albuterol 0.5 mg/2.5 mg/3 mL (DUONEB) 0.5-2.5 (3) MG/3ML neb solution Take 1 vial by nebulization every 2 hours as needed for shortness of breath / dyspnea or wheezing       polyethylene glycol (MIRALAX/GLYCOLAX) Packet Take 17 g by mouth daily as needed for constipation       predniSONE (DELTASONE) 10 MG tablet Take 10 mg by mouth daily       DULoxetine (CYMBALTA) 60 MG EC capsule TAKE ONE CAPSULE BY MOUTH ONE TIME DAILY  90 capsule 0     gabapentin (NEURONTIN) 300 MG capsule TAKE TWO CAPSULES BY MOUTH TWICE DAILY  120 capsule 0     tamsulosin (FLOMAX) 0.4 MG capsule Take 1 capsule (0.4 mg) by mouth daily 90 capsule 0     senna-docusate (SENOKOT-S;PERICOLACE) 8.6-50 MG per tablet Take 4 tablets by mouth 2 times daily  100 tablet 3     metoprolol (LOPRESSOR) 25 MG tablet TAKE HALF TABLET BY MOUTH TWICE DAILY  90 tablet 0     albuterol (2.5 MG/3ML) 0.083% neb solution INHALE 1 VIAL BY NEBULIZATION EVERY 2 HOURS AS NEEDED FOR DYSPNEA 540 mL 0     acetaminophen (MAPAP) 500 MG tablet Take 2 tablets (1,000 mg) by mouth 2 times daily 240 tablet 0     lidocaine (XYLOCAINE) 5 % ointment Apply topically 3 times daily as needed for moderate pain 50 g 1     omeprazole (PRILOSEC) 20 MG CR capsule TAKE ONE CAPSULE BY MOUTH ONE TIME DAILY  30 capsule 0     budesonide (PULMICORT) 0.5 MG/2ML neb solution Take 2 mLs (0.5 mg) by nebulization 2 times daily 60 mL 3     buPROPion (WELLBUTRIN XL) 150 MG 24 hr tablet TAKE ONE TABLET BY MOUTH EVERY NIGHT AT BEDTIME 90 tablet 0     montelukast (SINGULAIR) 10  "MG tablet TAKE ONE TABLET BY MOUTH EVERY NIGHT AT BEDTIME 90 tablet 0     albuterol (PROAIR HFA, PROVENTIL HFA, VENTOLIN HFA) 108 (90 BASE) MCG/ACT inhaler Inhale 2 puffs into the lungs every 6 hours as needed for shortness of breath / dyspnea 1 Inhaler 6     order for DME Equipment being ordered: Oxygen Titration visit [facility]  Fax # [phone]  [phone]     Medications reviewed:  Medications reconciled to facility chart and changes were made to reflect current medications as identified as above med list. Below are the changes that were made:   Medications stopped since last EPIC medication reconciliation:   There are no discontinued medications.    Medications started since last [facility] medication reconciliation:  No orders of the defined types were placed in this encounter.      REVIEW OF SYSTEMS:  4 point ROS including Respiratory, CV, GI and , other than that noted in the HPI,  is negative    Physical Exam:  /71  Pulse 78  Temp 97.8  F (36.6  C)  Resp 20  Ht 5' 5\" (1.651 m)  Wt 230 lb (104.3 kg)  SpO2 95%  BMI 38.27 kg/m2    Resp: Effort WNL, LS diminished throughout. On supplemental O2 3 LNC  CV: VSS as above-   Abd- soft, nontender, BS +  Musc- YADAV- left shoulder with full ROM  Psych- alert, calm, pleasant      Recent Labs:   CBC RESULTS:   Recent Labs   Lab Test  05/18/17   0635  05/15/17   0700   WBC  9.9  11.3*   RBC  3.70*  3.73*   HGB  10.8*  10.8*   HCT  34.3*  35.1*   MCV  93  94   MCH  29.2  29.0   MCHC  31.5  30.8*   RDW  14.4  14.4   PLT  395  414       Last Basic Metabolic Panel:  Recent Labs   Lab Test  05/18/17   0635  05/15/17   0700   NA  139  140   POTASSIUM  4.3  4.0   CHLORIDE  101  98   GITA  9.0  9.0   CO2  32  35*   BUN  14  17   CR  0.79  0.98   GLC  107*  108*       Liver Function Studies -   Recent Labs   Lab Test  05/03/17   0915  10/11/16   1620   PROTTOTAL  7.4  8.2   ALBUMIN  3.8  4.0   BILITOTAL  0.3  0.4   ALKPHOS  88  107   AST  27  33   ALT  23  23       TSH "   Date Value Ref Range Status   05/14/2014 1.15 0.40 - 4.50 mIU/L Final   03/30/2011 0.86 0.40 - 4.50 mIU/L Final     Comment:     Test Performed at:  [facility] [address]  [address]  JAME OROSCO MD     Lab Results   Component Value Date    A1C 6.3 02/19/2016    A1C 5.4 04/07/2013     Assessment/Plan:  Fall, initial encounter  - requires assist with transfers, remind/cue patient  - d/c plan reviewed and services increased to 24/7    Acute pain of left shoulder  - likely soft tissue injury  - treat with prn acetaminophen  - follow clinically    Lip laceration, initial encounter  - cleanse and apply bacitracin and bandaid until healed          Total time spent with patient visit at the skilled nursing facility was 45 min including patient visit, review of past records and care conference as above. Greater than 50% of total time spent with counseling and coordinating care due to as above    Electronically signed by  EMI Prasad CNP

## 2017-05-30 NOTE — TELEPHONE ENCOUNTER
"The patient was in the ED on 5/11/17 and was discharged back to [facility]. Per chart review, the pt was going to be discharged home on hospice, but he had a fall on 5/24, with a laceration to his lip.  His wife was then concerned she would not be able to manage his care at home, so they increased home care services to 24/7, per note.    Spoke with Gary, pt's  Hospice RN. Gary said pt discharged home on Fri, 5/26, and he saw him that day. Gary said the family is currently paying [facility] for 24/7 care, \"which is what he needs\". He said the plan is that the family is going to reassess the pt's situation and care today. Gary has concerns that they may cancel the care due to the cost, but said he does not think pt's wife can care for him at home alone.    Per Gary, the pt's symptoms are currently managed and he sees him mostly to set up his medications.    Called the patient's wife, Britt, and she said things are going pretty well. She said that having the caregiver there is nice, but they are concerned about money. They are meeting today with Juan Manuel Johnson and the  Hospice SW to discuss ongoing care.    Hilaria Mitchell NP, is scheduled to see the patient on 6/9/17. Will route to her for her information.    Jocelyn Mireles RN        "

## 2017-05-30 NOTE — TELEPHONE ENCOUNTER
Patient was seen for cellulitis of right lower extremity. Patient is scheduled to see Nidia Mitchell on 6/9/2017. Will route to nurse to follow up with patient.

## 2017-05-31 NOTE — TELEPHONE ENCOUNTER
Mirela called in regards to pt. Requesting orders for skilled nursing for wound care, PT and OT eval and treat.     Mirela can be reached with verbal

## 2017-05-31 NOTE — TELEPHONE ENCOUNTER
Returned Mirela's call with nesha for home care orders below, per Hilaria Mitchell NP.    Jocelyn Mireles RN

## 2017-06-01 NOTE — TELEPHONE ENCOUNTER
Message from HAYDEN [facility] requesting orders and giving update.      Patient has fell and split open his toe.  Since returning home from the hospital and rehab she notes wound to be 2 cm x 0.3 cm with crusty yellow, crunchy looking drainage.  Family has been putting bacitracin on wound.  Rosana has taught family to clean, bacitracin and cover with gauze.    Requesting orders for    SN 2w3 with WOC to eval   PT/OT eval and treat.    Gave verbal ok on orders requested per RN protocol.    Parvin Cardenas RN

## 2017-06-06 NOTE — PROGRESS NOTES
Message sent to GEORGINA Huizar [facility] giving verbal ok on orders requested per RN protocol.    Parvin Cardenas RN

## 2017-06-06 NOTE — PROGRESS NOTES
[facility] now requests orders and shares plan of care/discharge summaries for some patients through [facility].  Please REPLY TO THIS MESSAGE in order to give authorization for orders when needed.  This is considered a verbal order, you will still receive a faxed copy of orders for signature.  Thank you for your assistance in improving collaboration for our patients.    Pt. can benefit from a few visits from PT for strengthening/gait/balance training.    ORDER    PT 2W3 for instruction with HEP, gait/transfers training, with CGer training to assist as needed.      Yamileth Anaya, PT  [email or web address]  [phone]

## 2017-06-09 NOTE — MR AVS SNAPSHOT
After Visit Summary   6/9/2017    Karan Vaz    MRN: [id number]           Patient Information     Date Of Birth          10/7/1932        Visit Information        Provider Department      6/9/2017 9:45 AM Nidia Mitchell APRN CNP [facility]        Today's Diagnoses     Acute on chronic diastolic congestive heart failure (H)    -  1    Closed fracture of phalanx of right fifth toe        ACP (advance care planning)        Insomnia due to medical condition          Care Instructions    Today's visit:      Monitor toe for redness, warm to touch, any signs of infection. Monitor temp. Please call the clinic right away if there is a change in the toe.      We will be back 7/14 at 9:45 am.    Increase Trazodone 75 mg at bedtime. I added half a pill to your pill box.    I spoke with Hospice RN Gary. A hospice RN will come out to see you tomorrow.     They will also set up your medications.           Follow-ups after your visit        Your next 10 appointments already scheduled     Jul 14, 2017  9:45 AM CDT   Return Visit with EMI Orta CNP   [facility]    [address]  [address]  [facility] [address]   [phone]            Jul 14, 2017  9:45 AM CDT   Return Visit with TORSTEN Bolanos   [facility] ([facility])    [address]  [address]  [facility] [address]   [phone]            Aug 11, 2017  9:45 AM CDT   Return Visit with EMI Orta CNP   [facility] ([facility])    [address]  [address]  [facility] [address]   [phone]            Aug 11, 2017  9:45 AM CDT   [facility] Visit with TORSTEN Bolanos   [facility]    [address]  [address]  [facility] [address]   [phone]            Sep 15, 2017  9:45 AM  "CDT   Return Visit with EMI Orta CNP   [facility]    [address]  [address]  [facility] [address] [phone]              Who to contact     If you have questions or need follow up information about today's clinic visit or your schedule please contact [facility] directly at [phone].  Normal or non-critical lab and imaging results will be communicated to you by [employer]art, letter or phone within 4 business days after the clinic has received the results. If you do not hear from us within 7 days, please contact the clinic through [employer] or phone. If you have a critical or abnormal lab result, we will notify you by phone as soon as possible.  Submit refill requests through [employer] or call your pharmacy and they will forward the refill request to us. Please allow 3 business days for your refill to be completed.          Additional Information About Your Visit        [employer]ner Information     [employer] lets you send messages to your doctor, view your test results, renew your prescriptions, schedule appointments and more. To sign up, go to [email or web address] . Click on \"Log in\" on the left side of the screen, which will take you to the Welcome page. Then click on \"Sign up Now\" on the right side of the page.     You will be asked to enter the access code listed below, as well as some personal information. Please follow the directions to create your username and password.     Your access code is: [email or web address]  Expires: 2017 10:22 AM     Your access code will  in 90 days. If you need help or a new code, please call your [facility] clinic or [phone].        Care EveryWhere ID     This is your Care EveryWhere ID. This could be used by other organizations to access your [facility] medical records  [id number]        Your Vitals Were     Pulse Temperature Respirations Pulse Oximetry          66 97.7  F (36.5  C) " (Oral) 18 96%         Blood Pressure from Last 3 Encounters:   06/09/17 112/65   05/24/17 159/71   05/21/17 134/76    Weight from Last 3 Encounters:   05/24/17 230 lb (104.3 kg)   05/21/17 213 lb 6.4 oz (96.8 kg)   05/17/17 233 lb 3.2 oz (105.8 kg)              Today, you had the following     No orders found for display       Primary Care Provider Office Phone # Fax #    Nidia EMI Esqueda [facility] [phone]       [facility] [address] [facility] [address]  [facility] [address]        Thank you!     Thank you for choosing [facility]  for your care. Our goal is always to provide you with excellent care. Hearing back from our patients is one way we can continue to improve our services. Please take a few minutes to complete the written survey that you may receive in the mail after your visit with us. Thank you!             Your Updated Medication List - Protect others around you: Learn how to safely use, store and throw away your medicines at www.disposemymeds.org.          This list is accurate as of: 6/9/17 11:59 PM.  Always use your most recent med list.                   Brand Name Dispense Instructions for use    * albuterol 108 (90 BASE) MCG/ACT Inhaler    PROAIR HFA/PROVENTIL HFA/VENTOLIN HFA    1 Inhaler    Inhale 2 puffs into the lungs every 6 hours as needed for shortness of breath / dyspnea       * albuterol (2.5 MG/3ML) 0.083% neb solution     540 mL    INHALE 1 VIAL BY NEBULIZATION EVERY 2 HOURS AS NEEDED FOR DYSPNEA       atropine 1 % ophthalmic solution      Take 2-4 drops by mouth every 2 hours as needed for secretions       bisacodyl 10 MG Suppository    DULCOLAX     Place 10 mg rectally daily as needed for constipation       budesonide 0.5 MG/2ML neb solution    PULMICORT    60 mL    Take 2 mLs (0.5 mg) by nebulization 2 times daily       buPROPion 150 MG 24 hr tablet    WELLBUTRIN XL    90 tablet    TAKE ONE TABLET BY MOUTH EVERY NIGHT AT BEDTIME        DULoxetine 60 MG EC capsule    CYMBALTA    90 capsule    TAKE ONE CAPSULE BY MOUTH ONE TIME DAILY       gabapentin 300 MG capsule    NEURONTIN    120 capsule    TAKE TWO CAPSULES BY MOUTH TWICE DAILY       hydrOXYzine 25 MG tablet    ATARAX    20 tablet    Take 1 tablet (25 mg) by mouth every 6 hours as needed for other (adjuvant pain)       hypromellose-dextran 0.3-0.1% opthalmic solution      Place 1 drop into both eyes every hour as needed       * ipratropium - albuterol 0.5 mg/2.5 mg/3 mL 0.5-2.5 (3) MG/3ML neb solution    DUONEB     Take 1 vial by nebulization 4 times daily       * ipratropium - albuterol 0.5 mg/2.5 mg/3 mL 0.5-2.5 (3) MG/3ML neb solution    DUONEB     Take 1 vial by nebulization every 2 hours as needed for shortness of breath / dyspnea or wheezing       lidocaine 5 % ointment    XYLOCAINE    50 g    Apply topically 3 times daily as needed for moderate pain       MAPAP 500 MG tablet   Generic drug:  acetaminophen     240 tablet    Take 2 tablets (1,000 mg) by mouth 2 times daily       methadone 5 MG tablet    DOLOPHINE    10 tablet    Take 0.5 tablets (2.5 mg) by mouth every 12 hours       metoprolol 25 MG tablet    LOPRESSOR    90 tablet    TAKE HALF TABLET BY MOUTH TWICE DAILY       montelukast 10 MG tablet    SINGULAIR    90 tablet    TAKE ONE TABLET BY MOUTH EVERY NIGHT AT BEDTIME       omeprazole 20 MG CR capsule    priLOSEC    30 capsule    TAKE ONE CAPSULE BY MOUTH ONE TIME DAILY       order for DME      Equipment being ordered: Oxygen Titration visit [facility] Fax # [phone]       oxyCODONE 5 MG IR tablet    ROXICODONE    20 tablet    Take 0.5-1 tablets (2.5-5 mg) by mouth every 3 hours as needed for moderate to severe pain       polyethylene glycol Packet    MIRALAX/GLYCOLAX     Take 17 g by mouth daily as needed for constipation       predniSONE 10 MG tablet    DELTASONE     Take 10 mg by mouth daily       senna-docusate 8.6-50 MG per tablet    SENOKOT-S;PERICOLACE    100  tablet    Take 4 tablets by mouth 2 times daily       Simethicone 180 MG Caps      Take 180 mg by mouth 2 times daily       tamsulosin 0.4 MG capsule    FLOMAX    90 capsule    Take 1 capsule (0.4 mg) by mouth daily       * torsemide 10 MG tablet    DEMADEX    30 tablet    Take 2 tablets (20 mg) by mouth daily       * torsemide 10 MG tablet    DEMADEX    90 tablet    TAKE ONE TABLET BY MOUTH ONE TIME DAILY       TRAMADOL HCL PO      Take 25 mg by mouth nightly as needed for moderate to severe pain       * Notice:  This list has 6 medication(s) that are the same as other medications prescribed for you. Read the directions carefully, and ask your doctor or other care provider to review them with you.

## 2017-06-09 NOTE — MR AVS SNAPSHOT
After Visit Summary   6/9/2017    Karan Vaz    MRN: [id number]           Patient Information     Date Of Birth          10/7/1932        Visit Information        Provider Department      [facility]/2017 9:45 [facility]        Today's Diagnoses     Major depressive disorder, recurrent episode, moderate (H)    -  1       Follow-ups after your visit        Your next 10 appointments already scheduled     Jul 14, 2017  9:45 AM CDT   [facility]    [address]  [address]  [facility] [address]   [phone]            Jul 14, 2017  9:45 AM CDT   [facility]   [facility]    [address]  [address]  [facility] [address] [phone]            Aug 11, 2017  9:45 AM CDT   [facility]    [address]  [address]  [facility] [address] [phone]            Aug 11, 2017  9:45 AM CDT   [facility]   [facility]    [address]  [address]  [facility] [address] [phone]            Sep 15, 2017  9:45 AM CDT   [facility]    [address]  [address]  [facility] [address] [phone]              Who to contact     If you have questions or need follow up information about today's clinic visit or your schedule please contact [facility] directly at [phone].  Normal or non-critical lab and imaging results will be communicated to you by MyChart, letter or phone within 4 business days after the clinic has received the results. If you do  "not hear from us within 7 days, please contact the clinic through [employer] or phone. If you have a critical or abnormal lab result, we will notify you by phone as soon as possible.  Submit refill requests through [employer] or call your pharmacy and they will forward the refill request to us. Please allow 3 business days for your refill to be completed.          Additional Information About Your Visit        Medical [employer] Information     [employer] lets you send messages to your doctor, view your test results, renew your prescriptions, schedule appointments and more. To sign up, go to [email or web address] . Click on \"Log in\" on the left side of the screen, which will take you to the Welcome page. Then click on \"Sign up Now\" on the right side of the page.     You will be asked to enter the access code listed below, as well as some personal information. Please follow the directions to create your username and password.     Your access code is: [email or web address]  Expires: 2017 10:22 AM     Your access code will  in 90 days. If you need help or a new code, please call your [place] clinic or [phone].        Care EveryWhere ID     This is your Care EveryWhere ID. This could be used by other organizations to access your [place] medical records  [id number]         Blood Pressure from Last 3 Encounters:   17 112/65   17 159/71   17 134/76    Weight from Last 3 Encounters:   17 230 lb (104.3 kg)   17 213 lb 6.4 oz (96.8 kg)   17 233 lb 3.2 oz (105.8 kg)              Today, you had the following     No orders found for display       Primary Care Provider Office Phone # Fax #    EMI Orta [facility] [phone]       [facility] [address] [facility] [address]  [facility] [address]        Thank you!     Thank you for choosing [facility]  for your care. Our goal is always to provide you with excellent care. Hearing back from our patients " is one way we can continue to improve our services. Please take a few minutes to complete the written survey that you may receive in the mail after your visit with us. Thank you!             Your Updated Medication List - Protect others around you: Learn how to safely use, store and throw away your medicines at www.disposemymeds.org.          This list is accurate as of: 6/9/17 10:22 AM.  Always use your most recent med list.                   Brand Name Dispense Instructions for use    * albuterol 108 (90 BASE) MCG/ACT Inhaler    PROAIR HFA/PROVENTIL HFA/VENTOLIN HFA    1 Inhaler    Inhale 2 puffs into the lungs every 6 hours as needed for shortness of breath / dyspnea       * albuterol (2.5 MG/3ML) 0.083% neb solution     540 mL    INHALE 1 VIAL BY NEBULIZATION EVERY 2 HOURS AS NEEDED FOR DYSPNEA       atropine 1 % ophthalmic solution      Take 2-4 drops by mouth every 2 hours as needed for secretions       bisacodyl 10 MG Suppository    DULCOLAX     Place 10 mg rectally daily as needed for constipation       budesonide 0.5 MG/2ML neb solution    PULMICORT    60 mL    Take 2 mLs (0.5 mg) by nebulization 2 times daily       buPROPion 150 MG 24 hr tablet    WELLBUTRIN XL    90 tablet    TAKE ONE TABLET BY MOUTH EVERY NIGHT AT BEDTIME       DULoxetine 60 MG EC capsule    CYMBALTA    90 capsule    TAKE ONE CAPSULE BY MOUTH ONE TIME DAILY       gabapentin 300 MG capsule    NEURONTIN    120 capsule    TAKE TWO CAPSULES BY MOUTH TWICE DAILY       hydrOXYzine 25 MG tablet    ATARAX    20 tablet    Take 1 tablet (25 mg) by mouth every 6 hours as needed for other (adjuvant pain)       hypromellose-dextran 0.3-0.1% opthalmic solution      Place 1 drop into both eyes every hour as needed       * ipratropium - albuterol 0.5 mg/2.5 mg/3 mL 0.5-2.5 (3) MG/3ML neb solution    DUONEB     Take 1 vial by nebulization 4 times daily       * ipratropium - albuterol 0.5 mg/2.5 mg/3 mL 0.5-2.5 (3) MG/3ML neb solution    DUONEB     Take 1  vial by nebulization every 2 hours as needed for shortness of breath / dyspnea or wheezing       lidocaine 5 % ointment    XYLOCAINE    50 g    Apply topically 3 times daily as needed for moderate pain       MAPAP 500 MG tablet   Generic drug:  acetaminophen     240 tablet    Take 2 tablets (1,000 mg) by mouth 2 times daily       methadone 5 MG tablet    DOLOPHINE    10 tablet    Take 0.5 tablets (2.5 mg) by mouth every 12 hours       metoprolol 25 MG tablet    LOPRESSOR    90 tablet    TAKE HALF TABLET BY MOUTH TWICE DAILY       montelukast 10 MG tablet    SINGULAIR    90 tablet    TAKE ONE TABLET BY MOUTH EVERY NIGHT AT BEDTIME       omeprazole 20 MG CR capsule    priLOSEC    30 capsule    TAKE ONE CAPSULE BY MOUTH ONE TIME DAILY       order for DME      Equipment being ordered: Oxygen Titration visit [facility] Fax # [phone]       oxyCODONE 5 MG IR tablet    ROXICODONE    20 tablet    Take 0.5-1 tablets (2.5-5 mg) by mouth every 3 hours as needed for moderate to severe pain       polyethylene glycol Packet    MIRALAX/GLYCOLAX     Take 17 g by mouth daily as needed for constipation       predniSONE 10 MG tablet    DELTASONE     Take 10 mg by mouth daily       senna-docusate 8.6-50 MG per tablet    SENOKOT-S;PERICOLACE    100 tablet    Take 4 tablets by mouth 2 times daily       Simethicone 180 MG Caps      Take 180 mg by mouth 2 times daily       tamsulosin 0.4 MG capsule    FLOMAX    90 capsule    Take 1 capsule (0.4 mg) by mouth daily       * torsemide 10 MG tablet    DEMADEX    30 tablet    Take 2 tablets (20 mg) by mouth daily       * torsemide 10 MG tablet    DEMADEX    90 tablet    TAKE ONE TABLET BY MOUTH ONE TIME DAILY       TRAMADOL HCL PO      Take 25 mg by mouth nightly as needed for moderate to severe pain       * Notice:  This list has 6 medication(s) that are the same as other medications prescribed for you. Read the directions carefully, and ask your doctor or other care provider to  review them with you.

## 2017-06-09 NOTE — PROGRESS NOTES
"[facility] - Complex Care Mobile  June 9, 2017    Behavioral Health Clinician Progress Note    Patient Name: Karan Vaz           Service Type: Individual      Service Location:   Face to Face in Home / Community     Session Start Time: 9:35 a.m.  Session End Time: 10:25 a.m.      Session Length: 38 - 52      Attendees: Patient, PCP, Spouse / Significant Other and MA    Visit Activities (Refresh list every visit): [facility]    Diagnostic Assessment Date: 12/8/16  Treatment Plan Review Date: 7/14/17  See Flowsheets for today's PHQ-9 and SIDNEY-7 results  Previous PHQ-9:   PHQ-9 SCORE 7/2/2012 10/12/2015 10/25/2016   Total Score 19 - -   Total Score - 18 22     Previous SIDNEY-7:   SIDNYE-7 SCORE 10/25/2016   Total Score 18       TANNER LEVEL:  TANNER Score (Last Two) 3/25/2016   TANNER Raw Score 0   Activation Score 0   TANNER Level 1       DATA  Extended Session (60+ minutes): No  Interactive Complexity: No  Crisis: No    Treatment Objective(s) Addressed in This Session:  Target Behavior(s): disease management/lifestyle changes depression    Depressed Mood: Increase interest, engagement, and pleasure in doing things  Decrease frequency and intensity of feeling down, depressed, hopeless  Improve quantity and quality of night time sleep / decrease daytime naps  Feel less tired and more energy during the day   Identify negative self-talk and behaviors: challenge core beliefs, myths, and actions  Decrease thoughts that you'd be better off dead or of suicide / self-harm    Current Stressors / Issues:  [facility] co-visit with patient, his wife, PCP, and MA. Patient appeared very fatigued and states he is \"tired\". Patient reports no significant concerns other than pain in his legs and feet.  Patient's wife continues to be overwhelmed with caring for him, though is uncertain about moving him to assisted living or getting more help at home. She reports cost is a barrier, and that if services aren't covered by insurance, they cannot afford " them. Patient is receiving home health care to treat a wound on his toe, and continues to receive hospice care. Suggested working with hospice to coordinate respite care, as patient's wife states she would like to get out of the house once in awhile.  She has been talking with their daughter about ongoing care needs and options, and will continue to think about what's best for herself and patient.  Patient's mood is somewhat improved. Anxiety symptoms have decreased and he is generally restful and calm. He eats consistently and is sleeping much of the day. He continues to wake a few times at night and reports he is not sure why he has difficulty sleeping. PCP reviewed medications for sleep.    Progress on Treatment Objective(s) / Homework:  Stable - MAINTENANCE (Working to maintain change, with risk of relapse); Intervened by continuing to positively reinforce healthy behavior choice     Motivational Interviewing    MI Intervention: Co-Developed Goal: review/update psychiatric medications, Expressed Empathy/Understanding, Supported Autonomy, Collaboration, Evocation, Permission to raise concern or advise, Open-ended questions and Reflections: simple and complex     Change Talk Expressed by the Patient: Desire to change Ability to change Reasons to change Need to change    Provider Response to Change Talk: E - Evoked more info from patient about behavior change, A - Affirmed patient's thoughts, decisions, or attempts at behavior change, R - Reflected patient's change talk and S - Summarized patient's change talk statements      Care Plan review completed: No    Medication Review:  Changes to psychiatric medications, see updated Medication List in EPIC. - PCP may adjust trazodone to improve sleep.     Medication Compliance:  Yes    Changes in Health Issues:  Please see medical note by PCP EMI Gordillo, CNP    Chemical Use Review:   Substance Use: Chemical use reviewed, no active concerns identified       Tobacco Use: No current tobacco use.      Assessment: Current Emotional / Mental Status (status of significant symptoms):  Risk status (Self / Other harm or suicidal ideation)  Patient denies a history of suicidal ideation, suicide attempts, self-injurious behavior, homicidal ideation, homicidal behavior and and other safety concerns  Patient denies current fears or concerns for personal safety.  Patient denies current or recent suicidal ideation or behaviors.  Patient denies current or recent homicidal ideation or behaviors.  Patient denies current or recent self injurious behavior or ideation.  Patient denies other safety concerns.  A safety and risk management plan has not been developed at this time, however patient was encouraged to call [place] [phone] should there be a change in any of these risk factors.      Appearance:   Disheveled - seated in wheelchair, fatigued  Eye Contact:   Fair   Psychomotor Behavior: Normal   Attitude:   Cooperative   Orientation:   All  Speech   Rate / Production: Slow    Volume:  Normal   Mood:    Depressed  Fatigued   Affect:    Flat  Lethargic   Thought Content:  Clear   Thought Form:  Coherent   Insight:    Fair     Diagnoses:  1. Major depressive disorder, recurrent episode, moderate (H)        Collateral Reports Completed:  Not Applicable    Plan: (Homework, other):  Patient was given information about behavioral services and will schedule a follow up visit as needed. He was also given information about mental health symptoms and treatment options , deep Breathing Strategies to practice when experiencing anxiety and depression and sleep hygiene strategies.  CD Recommendations: No indications of CD issues.       TORSTEN Patel, [facility]                                                    Treatment Plan    Client's Name: Karan Vaz  YOB: 1932    Date Updated: 4/14/17    DSM-V Diagnoses: 296.32 Major Depressive Disorder, Recurrent Episode, Moderate  With anxious distress  Psychosocial / Contextual Factors: patient lives at home with his wife, who is also his primary caregiver. It is increasingly difficult for her to care for him, and they are open to home care if it's covered by insurance. Patient's primary goal is to remain in his home.  WHODAS: 58    Referral / Collaboration:  Was/were discussed and client will pursue: [facility] referral    Anticipated number of session or this episode of care: 12-24 per year      MeasurableTreatment Goal(s) related to diagnosis / functional impairment(s)  Goal 1: Client will manage anxiety and depression by taking medications as prescribed and practicing adaptive coping skills to manage SOB.    I will know I've met my goal when I can sleep and not worry so much.      Objective #A (Client Action)    Client will Decrease frequency and intensity of feeling down, depressed, hopeless  Improve quantity and quality of night time sleep / decrease daytime naps  Feel less tired and more energy during the day   Identify negative self-talk and behaviors: challenge core beliefs, myths, and actions  Decrease thoughts that you'd be better off dead or of suicide / self-harm.  Status: Continued - Date(s): 4/14/17     Intervention(s)  Therapist will teach mindful breathing and meditation to induce relaxation.    Objective #B  Client will Increase interest, engagement, and pleasure in doing things  Decrease frequency and intensity of feeling down, depressed, hopeless  Feel less tired and more energy during the day   Identify negative self-talk and behaviors: challenge core beliefs, myths, and actions.  Status: Continued - Date(s): 4/14/17     Intervention(s)  Therapist will provide educational materials on sleep hygiene.    Client has reviewed and agreed to the above plan.      ATILIO Patel, LICSW  Reviewed/Revised: 4/14/17

## 2017-06-09 NOTE — PATIENT INSTRUCTIONS
Today's visit:      Monitor toe for redness, warm to touch, any signs of infection. Monitor temp. Please call the clinic right away if there is a change in the toe.      We will be back 7/14 at 9:45 am.    Increase Trazodone 75 mg at bedtime. I added half a pill to your pill box.    I spoke with Hospice RN Gary. A hospice RN will come out to see you tomorrow.     They will also set up your medications.

## 2017-07-11 NOTE — TELEPHONE ENCOUNTER
Received call from HAYDEN [facility] with update on patient condition.  He remains in hospice and homecare has been in also for wound care.  Patient toe laceration improved.  Patient had a fall last week resulting in a skin tear to his hand.  Wounds now manageable so Rosana plans to discontinue homecare episode no later than next Monday.      Hospice is planning to see patient today.  Spouse is uncertain if she can care for patient anymore.  Provider and [facility] scheduled to see patient on Friday this week.  Sent message to hospice team requesting update after their next visit.    Parvin Cardenas RN

## 2017-07-12 NOTE — TELEPHONE ENCOUNTER
Sent message to [facility] Del[facility] who made visit with patient yesterday.  Requested return response with update on patient.    Parvin Cardenas RN

## 2017-07-13 NOTE — TELEPHONE ENCOUNTER
Received message from HAYDEN Delong [facility] who gave last two visit summaries from hospice for update:    7/5/17  patient was sleeping in his bed.  he appeared comfortable.  focus of visit was support for the wife d/t she is dealing with her family who has been trying to get involved to make decisions yet they do not get involved with their dads care.  she states taht they are pushing her to sell the house to move in to assisted living and that perhaps the patient should go into assisted living.  however, there is a snf that the patient an go to [facility].  this would save the money and would allow the wife to stay in the home where she wants to stay.  this writer encouraged the wife to verbalize to the family to stop pushing her on decisions unless they are going to become more physically involved with the care of their father.  the wife was in tears today    7/11/17 RN visit  Pt sleeping in bed for most of visit. Awakened at end and in process of getting up for day. SOB with cough noted. Doing neb tx. Wife requests pt have suppository as no bm x 3 or 4 days. Has smear of stool on glove. Hired caregiver present. Wife is trying to work in yard, but fatigued due to heat.  [facility]eds [facility] up in [facility].  Ordered refills for simethecone, trazadone and wellbutrin. Call to [facility] to refill gabapentin and tamsulosin, informed wife she can  at [facility] today.    The patient was very sob upon waking up around 1130 am and was doing a nebulizer treatment and he did not talk much during my visit. I don t know them well, but the wife did say she feels the family is pushing her to make decisions she is not ready to make. She feels overwhelmed with patient cares at times despite having a hired caregiver to assist.    Routing to provider and [facility] as FYI.  Provider and C scheduled to see patient 7/14/17.    Parvin Cardenas RN

## 2017-07-14 NOTE — MR AVS SNAPSHOT
After Visit Summary   7/14/2017    Karan Vaz    MRN: [id number]           Patient Information     Date Of Birth          10/7/1932        Visit Information        Provider Department      7/14/2017 9:45 AM Nidia Mitchell APRN CNP [facility]        Today's Diagnoses     Acute on chronic diastolic congestive heart failure (H)    -  1    Cough          Care Instructions    Start Guaifenesin 400 mg every 4 hrs PRN for cough    Start using Morphine more often, every 2 hrs for pain or SOB.     [facility]          Follow-ups after your visit        Your next 10 appointments already scheduled     Aug 11, 2017  9:45 AM CDT   Return Visit with EMI Orta CNP   [facility] ([facility]    [address]  [address]  [facility] [place] [id number] [phone]            Aug 11, 2017  9:45 AM CDT   Return Visit with TORSTEN Bolanos   [facility] ([facility])    [address]  [address]  [facility] [place] [id number] [phone]            Sep 15, 2017  9:45 AM CDT   Return Visit with EMI Orta CNP   [facility] ([facility])    [address]  [address] [place] [id number] [phone]              Who to contact     If you have questions or need follow up information about today's clinic visit or your schedule please contact [facility] directly at [phone].  Normal or non-critical lab and imaging results will be communicated to you by MyChart, letter or phone within 4 business days after the clinic has received the results. If you do not hear from us within 7 days, please contact the clinic through MyChart or phone. If you have a critical or abnormal lab result, we will notify you by phone as soon as possible.  Submit refill requests through [employer] or  "call your pharmacy and they will forward the refill request to us. Please allow 3 business days for your refill to be completed.          Additional Information About Your Visit        MyChart Information     [employer] lets you send messages to your doctor, view your test results, renew your prescriptions, schedule appointments and more. To sign up, go to [email or web address] . Click on \"Log in\" on the left side of the screen, which will take you to the Welcome page. Then click on \"Sign up Now\" on the right side of the page.     You will be asked to enter the access code listed below, as well as some personal information. Please follow the directions to create your username and password.     Your access code is: [email or web address]  Expires: 2017 10:22 AM     Your access code will  in 90 days. If you need help or a new code, please call your [facility] clinic or [phone].        Care EveryWhere ID     This is your [facility] EveryWhere ID. This could be used by other organizations to access your [facility] medical records  [id number]        Your Vitals Were     Pulse Pulse Oximetry                82 98%           Blood Pressure from Last 3 Encounters:   17 112/65   17 159/71   17 134/76    Weight from Last 3 Encounters:   17 230 lb (104.3 kg)   17 213 lb 6.4 oz (96.8 kg)   17 233 lb 3.2 oz (105.8 kg)              Today, you had the following     No orders found for display       Primary Care Provider Office Phone # Fax #    Nidia Mitchell, EMI [facility] [phone]       [facility] [address] [facility] [address] [place] [id number]        Equal Access to Services     NADIA BHATTI : Isela Garcia, derik be, elke kaalmada estephania, dustin osman. So [facility] [phone].    ATENCIÓN: Si habla español, tiene a bliss disposición servicios gratuitos de asistencia lingüística. Llame al [phone].    We comply " with applicable federal civil rights laws and [place] laws. We do not discriminate on the basis of race, color, national origin, age, disability sex, sexual orientation or gender identity.            Thank you!     Thank you for choosing [facility]  for your care. Our goal is always to provide you with excellent care. Hearing back from our patients is one way we can continue to improve our services. Please take a few minutes to complete the written survey that you may receive in the mail after your visit with us. Thank you!             Your Updated Medication List - Protect others around you: Learn how to safely use, store and throw away your medicines at www.disposemymeds.org.          This list is accurate as of: 7/14/17 11:59 PM.  Always use your most recent med list.                   Brand Name Dispense Instructions for use Diagnosis    * albuterol 108 (90 BASE) MCG/ACT Inhaler    PROAIR HFA/PROVENTIL HFA/VENTOLIN HFA    1 Inhaler    Inhale 2 puffs into the lungs every 6 hours as needed for shortness of breath / dyspnea    Mild intermittent asthma without complication       * albuterol (2.5 MG/3ML) 0.083% neb solution     540 mL    INHALE 1 VIAL BY NEBULIZATION EVERY 2 HOURS AS NEEDED FOR DYSPNEA    Moderate persistent asthma without complication       atropine 1 % ophthalmic solution      Take 2-4 drops by mouth every 2 hours as needed for secretions        bisacodyl 10 MG Suppository    DULCOLAX     Place 10 mg rectally daily as needed for constipation        budesonide 0.5 MG/2ML neb solution    PULMICORT    60 mL    Take 2 mLs (0.5 mg) by nebulization 2 times daily    Chronic obstructive pulmonary disease, unspecified COPD type (H)       buPROPion 150 MG 24 hr tablet    WELLBUTRIN XL    90 tablet    TAKE ONE TABLET BY MOUTH EVERY NIGHT AT BEDTIME    Depression, major, recurrent, moderate (H)       DULoxetine 60 MG EC capsule    CYMBALTA    90 capsule    TAKE ONE CAPSULE BY MOUTH ONE TIME  DAILY    Depression, major, recurrent, moderate (H)       gabapentin 300 MG capsule    NEURONTIN    120 capsule    TAKE TWO CAPSULES BY MOUTH TWICE DAILY    Gastroesophageal reflux disease without esophagitis       guaiFENesin 200 MG Tabs tablet    ORGANIDIN    60 tablet    Take 2 tablets (400 mg) by mouth every 4 hours as needed for cough    Cough       hydrOXYzine 25 MG tablet    ATARAX    20 tablet    Take 1 tablet (25 mg) by mouth every 6 hours as needed for other (adjuvant pain)    Acute pain of right knee       hypromellose-dextran 0.3-0.1% opthalmic solution      Place 1 drop into both eyes every hour as needed        * ipratropium - albuterol 0.5 mg/2.5 mg/3 mL 0.5-2.5 (3) MG/3ML neb solution    DUONEB     Take 1 vial by nebulization 4 times daily        * ipratropium - albuterol 0.5 mg/2.5 mg/3 mL 0.5-2.5 (3) MG/3ML neb solution    DUONEB     Take 1 vial by nebulization every 2 hours as needed for shortness of breath / dyspnea or wheezing        lidocaine 5 % ointment    XYLOCAINE    50 g    Apply topically 3 times daily as needed for moderate pain    Herpes zoster without complication       MAPAP 500 MG tablet   Generic drug:  acetaminophen     240 tablet    Take 2 tablets (1,000 mg) by mouth 2 times daily    Other chronic pain       methadone 5 MG tablet    DOLOPHINE    10 tablet    Take 0.5 tablets (2.5 mg) by mouth every 12 hours    Pain of finger of left hand       metoprolol 25 MG tablet    LOPRESSOR    90 tablet    TAKE HALF TABLET BY MOUTH TWICE DAILY    Essential hypertension       montelukast 10 MG tablet    SINGULAIR    90 tablet    TAKE ONE TABLET BY MOUTH EVERY NIGHT AT BEDTIME    Allergic rhinitis due to pollen, unspecified rhinitis seasonality       omeprazole 20 MG CR capsule    priLOSEC    30 capsule    TAKE ONE CAPSULE BY MOUTH ONE TIME DAILY    Gastroesophageal reflux disease without esophagitis       order for DME      Equipment being ordered: Oxygen Titration visit [facility] Fax  # [phone]    COPD exacerbation (H), Acute on chronic respiratory failure (H), Acute on chronic diastolic congestive heart failure (H)       oxyCODONE 5 MG IR tablet    ROXICODONE    20 tablet    Take 0.5-1 tablets (2.5-5 mg) by mouth every 3 hours as needed for moderate to severe pain    Acute pain of right knee       polyethylene glycol Packet    MIRALAX/GLYCOLAX     Take 17 g by mouth daily as needed for constipation        predniSONE 10 MG tablet    DELTASONE     Take 10 mg by mouth daily        senna-docusate 8.6-50 MG per tablet    SENOKOT-S;PERICOLACE    100 tablet    Take 4 tablets by mouth 2 times daily    Constipation, unspecified constipation type       Simethicone 180 MG Caps      Take 180 mg by mouth 2 times daily        tamsulosin 0.4 MG capsule    FLOMAX    90 capsule    Take 1 capsule (0.4 mg) by mouth daily    Hypertrophy of prostate with urinary retention       * torsemide 10 MG tablet    DEMADEX    30 tablet    Take 2 tablets (20 mg) by mouth daily    Obstructive chronic bronchitis with exacerbation (H)       * torsemide 10 MG tablet    DEMADEX    90 tablet    TAKE ONE TABLET BY MOUTH ONE TIME DAILY    Obstructive chronic bronchitis with exacerbation (H)       TRAMADOL HCL PO      Take 25 mg by mouth nightly as needed for moderate to severe pain        * Notice:  This list has 6 medication(s) that are the same as other medications prescribed for you. Read the directions carefully, and ask your doctor or other care provider to review them with you.

## 2017-07-14 NOTE — PROGRESS NOTES
[facility]  July 14, 2017    Behavioral Health Clinician Progress Note    Patient Name: Karan Vaz           Service Type: Individual      Service Location:   Face to Face in Home / Community     Session Start Time: 9:35 a.m.  Session End Time: 10:25 a.m.      Session Length: 38 - 52      Attendees: Patient, PCP and Spouse / Significant Other    Visit Activities (Refresh list every visit): [facility]    Diagnostic Assessment Date: 12/8/16  Treatment Plan Review Date: 7/14/17  See Flowsheets for today's PHQ-9 and SIDNEY-7 results  Previous PHQ-9:   PHQ-9 SCORE 7/2/2012 10/12/2015 10/25/2016   Total Score 19 - -   Total Score - 18 22     Previous SIDNEY-7:   SIDNEY-7 SCORE 10/25/2016   Total Score 18       TANNER LEVEL:  TANNER Score (Last Two) 3/25/2016   TANNER Raw Score 0   Activation Score 0   TANNER Level 1       DATA  Extended Session (60+ minutes): No  Interactive Complexity: No  Crisis: No    Treatment Objective(s) Addressed in This Session:  Target Behavior(s): disease management/lifestyle changes depression    Depressed Mood: Increase interest, engagement, and pleasure in doing things  Decrease frequency and intensity of feeling down, depressed, hopeless  Improve quantity and quality of night time sleep / decrease daytime naps  Feel less tired and more energy during the day   Identify negative self-talk and behaviors: challenge core beliefs, myths, and actions  Decrease thoughts that you'd be better off dead or of suicide / self-harm    Current Stressors / Issues:  [facility] co-visit with patient, PCP, and patient's wife in their home. Upon arrival, patient's wife appears overwhelmed with the process of getting patient cleaned and out of bed. She becomes tearful explaining she is tired and has decided she can no longer care for patient at home. She has been working with a / who is assisting her in finding a nursing home for pt, [facility]  "Patient asserts that he wishes to die at home, while also acknowledging how difficult it is for his wife to continue providing all cares. Patient's wife processed thoughts and feelings about patient's decline, and states she is hopeful he will be able to move soon. She would like to re-establish their relationship as  and wife, rather than patient and caregiver. Patient continues to have in-home help during the day, though his wife states it is not enough.  Patient reports his mood is \"good\" and that he is sleeping better.   No appetite changes reported, though patient's wife states it takes him a long time to eat due to difficulty breathing.    Progress on Treatment Objective(s) / Homework:  Stable - MAINTENANCE (Working to maintain change, with risk of relapse); Intervened by continuing to positively reinforce healthy behavior choice     Motivational Interviewing    MI Intervention: Co-Developed Goal: review/update psychiatric medications, Expressed Empathy/Understanding, Supported Autonomy, Collaboration, Evocation, Permission to raise concern or advise, Open-ended questions and Reflections: simple and complex     Change Talk Expressed by the Patient: Desire to change Ability to change Reasons to change Need to change    Provider Response to Change Talk: E - Evoked more info from patient about behavior change, A - Affirmed patient's thoughts, decisions, or attempts at behavior change, R - Reflected patient's change talk and S - Summarized patient's change talk statements      Care Plan review completed: No    Medication Review:  No changes to current psychiatric medication(s)     Medication Compliance:  Yes    Changes in Health Issues:  Please see medical note by PCP EMI Gordillo, CNP. Recent fall, increased weakness.    Chemical Use Review:   Substance Use: Chemical use reviewed, no active concerns identified      Tobacco Use: No current tobacco use.      Assessment: Current Emotional / Mental " Status (status of significant symptoms):  Risk status (Self / Other harm or suicidal ideation)  Patient denies a history of suicidal ideation, suicide attempts, self-injurious behavior, homicidal ideation, homicidal behavior and and other safety concerns  Patient denies current fears or concerns for personal safety.  Patient denies current or recent suicidal ideation or behaviors.  Patient denies current or recent homicidal ideation or behaviors.  Patient denies current or recent self injurious behavior or ideation.  Patient denies other safety concerns.  A safety and risk management plan has not been developed at this time, however patient was encouraged to call [place] [phone] should there be a change in any of these risk factors.      Appearance:   Disheveled - seated in wheelchair, fatigued  Eye Contact:   Fair   Psychomotor Behavior: Normal   Attitude:   Cooperative   Orientation:   All  Speech   Rate / Production: Slow    Volume:  Normal   Mood:    Depressed   Affect:    Flat   Thought Content:  Clear   Thought Form:  Coherent   Insight:    Fair     Diagnoses:  1. Major depressive disorder, recurrent episode, moderate (H)        Collateral Reports Completed:  Not Applicable    Plan: (Homework, other):  Patient was given information about behavioral services and will schedule a follow up visit as needed. He was also given information about mental health symptoms and treatment options , deep Breathing Strategies to practice when experiencing anxiety and depression and sleep hygiene strategies.  CD Recommendations: No indications of CD issues.       TORSTEN Patel, [facility]                                                    Treatment Plan    Client's Name: Karan Vaz  YOB: 1932    Date Updated: 4/14/17    DSM-V Diagnoses: 296.32 Major Depressive Disorder, Recurrent Episode, Moderate With anxious distress  Psychosocial / Contextual Factors: patient lives at home with his wife, who is also his  primary caregiver. It is increasingly difficult for her to care for him, and they are open to home care if it's covered by insurance. Patient's primary goal is to remain in his home.  WHODAS: 58    Referral / Collaboration:  Was/were discussed and client will pursue: [facility] referral    Anticipated number of session or this episode of care: 12-24 per year      MeasurableTreatment Goal(s) related to diagnosis / functional impairment(s)  Goal 1: Client will manage anxiety and depression by taking medications as prescribed and practicing adaptive coping skills to manage SOB.    I will know I've met my goal when I can sleep and not worry so much.      Objective #A (Client Action)    Client will Decrease frequency and intensity of feeling down, depressed, hopeless  Improve quantity and quality of night time sleep / decrease daytime naps  Feel less tired and more energy during the day   Identify negative self-talk and behaviors: challenge core beliefs, myths, and actions  Decrease thoughts that you'd be better off dead or of suicide / self-harm.  Status: Continued - Date(s): 4/14/17     Intervention(s)  Therapist will teach mindful breathing and meditation to induce relaxation.    Objective #B  Client will Increase interest, engagement, and pleasure in doing things  Decrease frequency and intensity of feeling down, depressed, hopeless  Feel less tired and more energy during the day   Identify negative self-talk and behaviors: challenge core beliefs, myths, and actions.  Status: Continued - Date(s): 4/14/17     Intervention(s)  Therapist will provide educational materials on sleep hygiene.    Client has reviewed and agreed to the above plan.      ATILIO Patel, LICSW  Reviewed/Revised: 4/14/17

## 2017-07-14 NOTE — MR AVS SNAPSHOT
After Visit Summary   7/14/2017    Karan Vaz    MRN: [id number]           Patient Information     Date Of Birth          10/7/1932        Visit Information        Provider Department      7/14/2017 9:45 AM Roshan Powell LICSW [facility]        Today's Diagnoses     Major depressive disorder, recurrent episode, moderate (H)    -  1       Follow-ups after your visit        Your next 10 appointments already scheduled     Aug 11, 2017  9:45 AM CDT   Return Visit with EMI Orta CNP   [facility]    [address]  [address]  [facility] [address]   [phone]            Aug 11, 2017  9:45 AM CDT   Return Visit with TORSTEN Bolanos   [facility]    [address]  [address]  [facility] [address]   [phone]            Sep 15, 2017  9:45 AM CDT   Return Visit with EMI Orta CNP   [facility]    [address]  [address]  [facility] [address] [phone]              Who to contact     If you have questions or need follow up information about today's clinic visit or your schedule please contact [facility] directly at [phone].  Normal or non-critical lab and imaging results will be communicated to you by MyChart, letter or phone within 4 business days after the clinic has received the results. If you do not hear from us within 7 days, please contact the clinic through MyChart or phone. If you have a critical or abnormal lab result, we will notify you by phone as soon as possible.  Submit refill requests through [employer] or call your pharmacy and they will forward the refill request to us. Please allow 3 business days for your refill to be completed.          Additional Information About Your Visit        MyChart Information     [employer]  "lets you send messages to your doctor, view your test results, renew your prescriptions, schedule appointments and more. To sign up, go to [email or web address] . Click on \"Log in\" on the left side of the screen, which will take you to the Welcome page. Then click on \"Sign up Now\" on the right side of the page.     You will be asked to enter the access code listed below, as well as some personal information. Please follow the directions to create your username and password.     Your access code is: [email or web address]  Expires: 2017 10:22 AM     Your access code will  in 90 days. If you need help or a new code, please call your [facility] clinic or [phone].        Care EveryWhere ID     This is your Care EveryWhere ID. This could be used by other organizations to access your [facility] medical records  [id number]         Blood Pressure from Last 3 Encounters:   17 112/65   17 159/71   17 134/76    Weight from Last 3 Encounters:   17 230 lb (104.3 kg)   17 213 lb 6.4 oz (96.8 kg)   17 233 lb 3.2 oz (105.8 kg)              Today, you had the following     No orders found for display       Primary Care Provider Office Phone # Fax #    Nidia EMI Esqueda [facility] [phone]       [address] [address]        Equal Access to Services     NADIA BHATTI : Hadii jimbo ku hadasho Soomaali, waaxda luqadaha, qaybta kaalmada adeegyakayla, dustin osman. So [facility] [phone].    ATENCIÓN: Si habla español, tiene a lbiss disposición servicios gratuitos de asistencia lingüística. Llame al [phone].    We comply with applicable federal civil rights laws and [place] laws. We do not discriminate on the basis of race, color, national origin, age, disability sex, sexual orientation or gender identity.            Thank you!     Thank you for choosing [facility]  for your care. Our " goal is always to provide you with excellent care. Hearing back from our patients is one way we can continue to improve our services. Please take a few minutes to complete the written survey that you may receive in the mail after your visit with us. Thank you!             Your Updated Medication List - Protect others around you: Learn how to safely use, store and throw away your medicines at www.disposemymeds.org.          This list is accurate as of: 7/14/17 11:59 PM.  Always use your most recent med list.                   Brand Name Dispense Instructions for use Diagnosis    * albuterol 108 (90 BASE) MCG/ACT Inhaler    PROAIR HFA/PROVENTIL HFA/VENTOLIN HFA    1 Inhaler    Inhale 2 puffs into the lungs every 6 hours as needed for shortness of breath / dyspnea    Mild intermittent asthma without complication       * albuterol (2.5 MG/3ML) 0.083% neb solution     540 mL    INHALE 1 VIAL BY NEBULIZATION EVERY 2 HOURS AS NEEDED FOR DYSPNEA    Moderate persistent asthma without complication       atropine 1 % ophthalmic solution      Take 2-4 drops by mouth every 2 hours as needed for secretions        bisacodyl 10 MG Suppository    DULCOLAX     Place 10 mg rectally daily as needed for constipation        budesonide 0.5 MG/2ML neb solution    PULMICORT    60 mL    Take 2 mLs (0.5 mg) by nebulization 2 times daily    Chronic obstructive pulmonary disease, unspecified COPD type (H)       buPROPion 150 MG 24 hr tablet    WELLBUTRIN XL    90 tablet    TAKE ONE TABLET BY MOUTH EVERY NIGHT AT BEDTIME    Depression, major, recurrent, moderate (H)       DULoxetine 60 MG EC capsule    CYMBALTA    90 capsule    TAKE ONE CAPSULE BY MOUTH ONE TIME DAILY    Depression, major, recurrent, moderate (H)       gabapentin 300 MG capsule    NEURONTIN    120 capsule    TAKE TWO CAPSULES BY MOUTH TWICE DAILY    Gastroesophageal reflux disease without esophagitis       guaiFENesin 200 MG Tabs tablet    ORGANIDIN    60 tablet    Take 2  tablets (400 mg) by mouth every 4 hours as needed for cough    Cough       hydrOXYzine 25 MG tablet    ATARAX    20 tablet    Take 1 tablet (25 mg) by mouth every 6 hours as needed for other (adjuvant pain)    Acute pain of right knee       hypromellose-dextran 0.3-0.1% opthalmic solution      Place 1 drop into both eyes every hour as needed        * ipratropium - albuterol 0.5 mg/2.5 mg/3 mL 0.5-2.5 (3) MG/3ML neb solution    DUONEB     Take 1 vial by nebulization 4 times daily        * ipratropium - albuterol 0.5 mg/2.5 mg/3 mL 0.5-2.5 (3) MG/3ML neb solution    DUONEB     Take 1 vial by nebulization every 2 hours as needed for shortness of breath / dyspnea or wheezing        lidocaine 5 % ointment    XYLOCAINE    50 g    Apply topically 3 times daily as needed for moderate pain    Herpes zoster without complication       MAPAP 500 MG tablet   Generic drug:  acetaminophen     240 tablet    Take 2 tablets (1,000 mg) by mouth 2 times daily    Other chronic pain       methadone 5 MG tablet    DOLOPHINE    10 tablet    Take 0.5 tablets (2.5 mg) by mouth every 12 hours    Pain of finger of left hand       metoprolol 25 MG tablet    LOPRESSOR    90 tablet    TAKE HALF TABLET BY MOUTH TWICE DAILY    Essential hypertension       montelukast 10 MG tablet    SINGULAIR    90 tablet    TAKE ONE TABLET BY MOUTH EVERY NIGHT AT BEDTIME    Allergic rhinitis due to pollen, unspecified rhinitis seasonality       omeprazole 20 MG CR capsule    priLOSEC    30 capsule    TAKE ONE CAPSULE BY MOUTH ONE TIME DAILY    Gastroesophageal reflux disease without esophagitis       order for DME      Equipment being ordered: Oxygen Titration visit [facility] Fax # [phone]    COPD exacerbation (H), Acute on chronic respiratory failure (H), Acute on chronic diastolic congestive heart failure (H)       oxyCODONE 5 MG IR tablet    ROXICODONE    20 tablet    Take 0.5-1 tablets (2.5-5 mg) by mouth every 3 hours as needed for moderate to  severe pain    Acute pain of right knee       polyethylene glycol Packet    MIRALAX/GLYCOLAX     Take 17 g by mouth daily as needed for constipation        predniSONE 10 MG tablet    DELTASONE     Take 10 mg by mouth daily        senna-docusate 8.6-50 MG per tablet    SENOKOT-S;PERICOLACE    100 tablet    Take 4 tablets by mouth 2 times daily    Constipation, unspecified constipation type       Simethicone 180 MG Caps      Take 180 mg by mouth 2 times daily        tamsulosin 0.4 MG capsule    FLOMAX    90 capsule    Take 1 capsule (0.4 mg) by mouth daily    Hypertrophy of prostate with urinary retention       * torsemide 10 MG tablet    DEMADEX    30 tablet    Take 2 tablets (20 mg) by mouth daily    Obstructive chronic bronchitis with exacerbation (H)       * torsemide 10 MG tablet    DEMADEX    90 tablet    TAKE ONE TABLET BY MOUTH ONE TIME DAILY    Obstructive chronic bronchitis with exacerbation (H)       TRAMADOL HCL PO      Take 25 mg by mouth nightly as needed for moderate to severe pain        * Notice:  This list has 6 medication(s) that are the same as other medications prescribed for you. Read the directions carefully, and ask your doctor or other care provider to review them with you.

## 2017-07-14 NOTE — PROGRESS NOTES
SUBJECTIVE:                                                    Karan Vaz is a 84 year old male with significant for CHF, major depression, insomnia, glaucoma, panlobular emphysema, acute on chronic respiratory failure is being followed by the complex care program and is seen in the home today for the following health issues:     Patient is hospice enrolled. He is declining and showing progressive weakness. He had another fall last week. He was ambulating with his walker to the bathroom. He has a small abrasion on his left knuckle.    He also complains of worsened cough with secretions. Unable to clear. His cough is worse at night. Can last up to 30 mins. Has been taking nebulizers every 4 hrs PRN helping some.   He's taking methadone 5 mg in the AM and 7.5 mg in the PM and has breakthrough morphine 10 mg every 2 hrs PRN averaging 30 MME/daily. He reports continued pain in his right foot and left hand. He's also having trouble with constipation. Says that his last BM was 3 days ago.     Spoke with hospice RN today during the appointment. Was last seen 7/11/2017.       Constipation  Patient reports  that he hasn't had a BM in 3 days, but per wife he had a large BM last night and this morning. Has bowel regiment that is working.       Heart Failure Follow-up    Symptoms:    Shortness of breath: worsened. happens with rest and exertion. oxygen dependent 3 L.    Lower extremity edema: stable     Chest pain: No    Using more pillows than normal: No    Cough at night: yes     Weight:    Checking weight daily: No    Weight change: none    Cardiology visits, ER/UC, or hospital admissions since last visit: None    Medication side effects: none     Insomnia  Worsened on account of cough.     Caregiver burden  Patient lives at home with his aging wife who is primary care giver. She has help from 11-5 and at bedtime.  As we arrive at the door today wife is tearful saying that she can't handle it anymore. Apparently wife  has [facility]. Working with Angi WASHBURN.     Problem list and histories reviewed & adjusted, as indicated.  Additional history: as documented    Patient Active Problem List   Diagnosis     Myoclonus     History of peptic ulcer disease     elastofibroma thoracic wall, benign     Restless legs syndrome (RLS)     Sensory neuropathy (H)     Health Care Home     Hypertrophy of prostate with urinary retention     Carpal tunnel syndrome     Glaucoma     Degeneration of cervical intervertebral disc     Anal sphincter incompetence     ACP (advance care planning)     Primary insomnia     Panlobular emphysema (H)     Primary osteoarthritis of right shoulder     Bilateral edema of lower extremity     Essential hypertension     Acute on chronic respiratory failure (H)     Acute on chronic diastolic congestive heart failure (H)     Dysphagia     Gastroesophageal reflux disease, esophagitis presence not specified     Major depressive disorder, recurrent episode, moderate (H)     Abnormality of gait and mobility     Advanced directives, counseling/discussion     Frequent falls     Closed fracture of phalanx of right fifth toe     Past Surgical History:   Procedure Laterality Date     ARTHROPLASTY HIP BILATERAL       ARTHROSCOPY SHOULDER DISTAL CLAVICLE REPAIR  5/30/2012    Procedure:ARTHROSCOPY SHOULDER DISTAL CLAVICLE RESECTION; Left shoulder arthroscopy, Bicep tenontomy, debridement of partial thickness cuff tear, Subacromial Decompression.; Surgeon:MONIE MENDIETA; Location:[facility] OR     C SHOULDER SURG PROC UNLISTED      Shoulder     C SHOULDER SURG PROC UNLISTED      Shoulder     C SHOULDER SURG PROC UNLISTED      Shoulder     HC REVISE MEDIAN N/CARPAL TUNNEL SURG  7/01/02    simonet, left     HC REVISE MEDIAN N/CARPAL TUNNEL SURG  09/24/02    Dr Montiel left     PROST. MICROWAVE THERMOTX  2012    dr Sin      SIGMOIDOSCOPY FLEXIBLE N/A 2/16/2016    Procedure: SIGMOIDOSCOPY FLEXIBLE;   Surgeon: Ky Small MD;  Location:  GI       Social History   Substance Use Topics     Smoking status: Former Smoker     Quit date: 1/1/1990     Smokeless tobacco: Never Used      Comment: 20 years ago     Alcohol use No     Family History   Problem Relation Age of Onset     Colon Cancer No family hx of          SH:    Amount of exercise or physical activity: None    Problems taking medications regularly: No    Medication side effects: none  Diet: regular (no restrictions)    Reviewed and updated as needed this visit by clinical staff       Reviewed and updated as needed this visit by Provider         ROS:  Constitutional, HEENT, cardiovascular, pulmonary, GI, , musculoskeletal, neuro, skin, endocrine and psych systems are negative, except as otherwise noted.      ROS:  Constitutional, HEENT, cardiovascular, pulmonary, GI, , musculoskeletal, neuro, skin, endocrine and psych systems are negative, except as otherwise noted.    OBJECTIVE:                                                    Pulse 82  SpO2 98%  There is no height or weight on file to calculate BMI.  GENERAL: pale, frail, elderly and fatigued sitting up in WC in bedroom.   EYES: PERRL   HENT: normal cephalic/atraumatic  RESP: clear to auscultation.   CV: regular rates and rhythm, normal S1 S2, no S3 or S4 and no murmur.   ABDOMEN: soft, nontender, protuberant.   MS: Lower extremity edema, L pitting +1/ right trace amount.   SKIN: Right LE hardened pink skin.   NEURO: Normal strength and tone  PSYCH: somewhat confused. Oriented to person and place.     Diagnostic Test Results:  none        ASSESSMENT/PLAN:                                                    1. Acute on chronic diastolic congestive heart failure (H)  Hospice enrolled appropriately. Showing progressive weakness and decline. Pain needs improvement. Dicussed increasing PRN morphine and if this fails will speak with hospice about increasing methadone dose in AM.   Wife unable to care  for patient high level needs in home. She is burnt out. [facility].     2. Cough  New issue. Spoke with hospice RN and ordered guaifenesin.   - guaiFENesin (ORGANIDIN) 200 MG TABS tablet; Take 2 tablets (400 mg) by mouth every 4 hours as needed for cough  Dispense: 60 tablet; Refill: 0    [facility]  60 min spent in direct face to face time with this patient and wife in the home, greater than 50% in counseling fall, CHF meds  as stated above and coordination of care with hospice  as stated above.

## 2017-07-14 NOTE — PATIENT INSTRUCTIONS
Start Guaifenesin 400 mg every 4 hrs PRN for cough    Start using Morphine more often, every 2 hrs for pain or SOB.     Let us know once [facility]

## 2017-07-25 NOTE — TELEPHONE ENCOUNTER
Reason for call:  Other   Patient called regarding (reason for call): Pt moved to a SNF  Additional comments: Received call from Gary RN with  Hospice. He states that the pt has permanently moved to a SNF and would no longer need visits from [facility]      Gary can be reached at [phone] with any questions.

## 2017-08-29 PROBLEM — Z71.89 ADVANCED DIRECTIVES, COUNSELING/DISCUSSION: Status: RESOLVED | Noted: 2017-01-01 | Resolved: 2017-01-01

## 2017-11-29 NOTE — PATIENT INSTRUCTIONS
Chief Complaint   Patient presents with   • Hospital Follow-up       HISTORY OF PRESENT ILLNESS: Patient is a 89 y.o. male established patient here today for the following concerns:      Bulmaro is here brought in by his son and caregiver Christiano.  He was again hospitalized with weakness and syncope.  He was found to have another UTI after just finishing treatment with PICC line for urosepsis.  This time Enterococcus was isolated and he was discharged home on Augmentin.  He was also given amlodipine in the hospital but reports that his pressures have been low 100-110/70-80s at home so far.  No fevers, chills.  Still with low appetite.  Now on salt tabs for chronic hyponatremia.  Due for recheck       Past Medical, Social, and Family history reviewed and updated in EPIC    Allergies:Patient has no known allergies.    Current Outpatient Prescriptions   Medication Sig Dispense Refill   • D-Mannose Powder Take 1 g by mouth every day. 30 g 11   • lisinopril (PRINIVIL) 5 MG Tab Take 1 Tab by mouth every day. 30 Tab 0   • amoxicillin-clavulanate (AUGMENTIN) 875-125 MG Tab Take 1 Tab by mouth every 12 hours for 7 days. 14 Tab 0   • Oral Electrolytes (BUFFERED SALT) 482 MG Tab Take 1 Tab by mouth 4 times a day. 120 Tab 3   • sodium bicarbonate (SODIUM BICARBONATE) 650 MG Tab Take 1 Tab by mouth 2 times a day. 60 Tab 1   • metformin (GLUCOPHAGE) 500 MG Tab 1000 mg in am and 500 in  Tab 0   • clopidogrel (PLAVIX) 75 MG Tab Take 1 Tab by mouth every day. 30 Tab 0   • latanoprost (XALATAN) 0.005 % Solution Place 1 Drop in both eyes every day. 1 Bottle 0   • atorvastatin (LIPITOR) 10 MG Tab Take 0.5 Tabs by mouth every day. 45 Tab 3   • Omega-3 Fatty Acids (FISH OIL) 1000 MG Cap capsule Take 1,000 mg by mouth every day.     • aspirin EC (ECOTRIN) 81 MG Tablet Delayed Response Take 81 mg by mouth every day.     • B Complex Vitamins (VITAMIN B COMPLEX PO) Take 1 Tab by mouth every evening.     • Multiple Vitamins-Minerals (CENTRUM  No medication changes    Please do not take more than 3000 mg of Tylenol in 24 hrs    Stay on trazodone 25 mg at bedtime. We had a long discussion about good sleep hygiene. No eating past 7 pm, limit fluids to avoid having to go to the bathroom, limit TV and reading. Take trazodone 30 mins before bedtime.     For left sided ear pain we reviewed all of your imaging studies and there was nothing abnormal that was found.Try to practice relaxation techniques, and avoiding stress.  WE can consider trying a muscle relaxant to help your muscles around your jaw relax. Karla will be out next week.        "SILVER PO) Take 1 Tab by mouth every day.     • Cholecalciferol (VITAMIN D PO) Take 1 Cap by mouth every day.       No current facility-administered medications for this visit.          ROS:  Review of Systems   Constitutional: Negative for fever, chills, weight loss and malaise/fatigue.   HENT: Negative for ear pain, nosebleeds, congestion, sore throat and neck pain.    Eyes: Negative for blurred vision.   Respiratory: Negative for cough, sputum production, shortness of breath and wheezing.    Cardiovascular: Negative for chest pain, palpitations,  and leg swelling.   Gastrointestinal: Negative for heartburn, nausea, vomiting, diarrhea and abdominal pain.   Genitourinary: Negative for dysuria, urgency and frequency.   Musculoskeletal: Negative for myalgias, back pain and joint pain.   Skin: Negative for rash and itching.   Neurological: Negative for dizziness, tingling, tremors, sensory change, focal weakness and headaches.   Endo/Heme/Allergies: Does not bruise/bleed easily.   Psychiatric/Behavioral: Negative for depression, anxiety, suicidal ideas, insomnia and memory loss.      Exam:  Blood pressure 110/58, pulse 66, temperature 36.2 °C (97.2 °F), resp. rate 14, height 1.727 m (5' 8\"), weight 72.6 kg (160 lb), SpO2 98 %.    General:  Well nourished, well developed in NAD  Head is grossly normal.  Neck: Supple without JVD   Pulmonary:  Normal effort.   Cardiovascular: Regular rate  Extremities: no clubbing, cyanosis, or edema.  Psych: affect appropriate      Please note that this dictation was created using voice recognition software. I have made every reasonable attempt to correct obvious errors, but I expect that there are errors of grammar and possibly content that I did not discover before finalizing the note.    Assessment/Plan:  1. Hyponatremia  - BASIC METABOLIC PANEL; Future  Continue salt tabs, recheck in 2 weeks    2. Acute pyelonephritis  Repeat urine testing.   - URINE CULTURE(NEW); Future    3. " Recurrent UTI  Finish antibiotics completely.    - D-Mannose Powder; Take 1 g by mouth every day.  Dispense: 30 g; Refill: 11  - URINALYSIS; Future  - URINE CULTURE(NEW); Future    2 week follow up

## 2020-05-03 NOTE — TELEPHONE ENCOUNTER
Per my last note:  Helped look for lower cost alternatives to current regimen. Could switch to a combination anticholinergic+LABA and reviewed formulary but none are covered. Sent Rx for Anoro Ellipta to see what cost would be. Called pharmacy to follow-up, cost is >$400 without pursuing a PA. At this time I don't see any other options to reduce cost other than switching to QID Duoneb, however wife was reluctant to do this considering the amount of time it would take out of the day for administration. Will continue with current regimen.       OK for pt to take his usual regimen if desired, since it was working better.  Previously was taking Spiriva once a day, Perforomist (or Brovana) neb BID and budesonide neb BID.  Anoro Ellipta was a replacement for Spiriva and Perforomist; would not make sense to use Anoro Ellipta along with his other inhalers.    Karla Palafox, PharmD, BCACP    normal sinus rhythm

## 2024-07-17 NOTE — PROGRESS NOTES
SUBJECTIVE:                                                    Karan Vaz is a 84 year old male with significant for CHF, major depression, insomnia, glaucoma, panlobular emphysema, acute on chronic respiratory failure is being followed by the complex care program and is seen in the home today for the following health issues:     Patient is hospice enrolled. RN was out this week.  His condition is declining. He is very fatigued, and worsened shortness of breath today. Hospice increased morphine to 10 mg PRN for pain and dyspnea which has been effective for both dyspnea and pain.  His sleep is still very poor. Call out to hospice RN to discuss his medications.       Patient had recent hospital admission at [facility] from 05/03/2017-05/04/2017 for fall and RLE cellulitis. He was discharged to TCU and stayed 3 weeks and discharged on 05/22/2017.       Right 5th toe cellulitis follow up       Duration: 3 weeks     Description  Location: 5th toe proximal phalange laceration and swelling to toe.     Intensity:  moderate    Accompanying signs and symptoms: swelling    History  Previous similar problem: YES- he was admitted to hospital for cellulitis and was on ABX.   Previous evaluation:  x-ray, MRI    Precipitating or alleviating factors:  Trauma or overuse: YES  Aggravating factors include: none    Therapies tried and outcome: wound care and ABX  IMPRESSION:  1. Fifth toe proximal phalangeal diaphysis is poorly seen and thought  to be fractured. Adjacent marrow edema therefore may be reactive.  However, osteomyelitis cannot be entirely excluded, particularly if  there is an adjacent wound or draining sinus.  2. Fifth toe distal phalangeal periarticular marrow edema which also  could be degenerative or reactive. Again, osteomyelitis or septic  arthritis cannot be excluded, particularly if there is an adjacent  wound or draining sinus.  3. Diffuse forefoot dorsal and deeper soft tissue edema. MR cannot  distinguish  Zetia was stopped because cholesterol was so low.  Continue current meds and follow-up   reactive edema from cellulitis. No rim-enhancing soft  tissue fluid collection or abscess is demonstrated.     GAURAV TAYLOR MD     Heart Failure Follow-up    Symptoms:    Shortness of breath: worsened. happens with rest and exertion. oxygen dependent 3 L.    Lower extremity edema: stable     Chest pain: No    Using more pillows than normal: No    Cough at night: No    Weight:    Checking weight daily: No    Weight change: none    Cardiology visits, ER/UC, or hospital admissions since last visit: None    Medication side effects: none     Insomnia  Worsened. Is currently taking Trazodone 50 mg HS.     Caregiver burden  Patient lives at home with his aging wife who is primary care giver. She has help from 11-5 and at bedtime but she is worried about the cost and wants to lower this. She also says that patient wants to die at home as respite care was offered but she declined it.     Problem list and histories reviewed & adjusted, as indicated.  Additional history: as documented    Patient Active Problem List   Diagnosis     Myoclonus     History of peptic ulcer disease     elastofibroma thoracic wall, benign     Restless legs syndrome (RLS)     Sensory neuropathy (H)     Health Care Home     Hypertrophy of prostate with urinary retention     Carpal tunnel syndrome     Glaucoma     Degeneration of cervical intervertebral disc     Anal sphincter incompetence     ACP (advance care planning)     Primary insomnia     Panlobular emphysema (H)     Primary osteoarthritis of right shoulder     Bilateral edema of lower extremity     Essential hypertension     Acute on chronic respiratory failure (H)     Acute on chronic diastolic congestive heart failure (H)     Dysphagia     Gastroesophageal reflux disease, esophagitis presence not specified     Major depressive disorder, recurrent episode, moderate (H)     Abnormality of gait and mobility     Advanced directives, counseling/discussion     Frequent falls     Closed fracture of  phalanx of right fifth toe     Past Surgical History:   Procedure Laterality Date     ARTHROPLASTY HIP BILATERAL       ARTHROSCOPY SHOULDER DISTAL CLAVICLE REPAIR  5/30/2012    Procedure:ARTHROSCOPY SHOULDER DISTAL CLAVICLE RESECTION; Left shoulder arthroscopy, Bicep tenontomy, debridement of partial thickness cuff tear, Subacromial Decompression.; Surgeon:MONIE MENDIETA; Location:[facility] OR     C SHOULDER SURG PROC UNLISTED      Shoulder     C SHOULDER SURG PROC UNLISTED      Shoulder     C SHOULDER SURG PROC UNLISTED      Shoulder     HC REVISE MEDIAN N/CARPAL TUNNEL SURG  7/01/02    simonet, left     HC REVISE MEDIAN N/CARPAL TUNNEL SURG  09/24/02    Dr Montiel left     PROST. MICROWAVE THERMOTX  2012    dr Sin      SIGMOIDOSCOPY FLEXIBLE N/A 2/16/2016    Procedure: SIGMOIDOSCOPY FLEXIBLE;  Surgeon: Ky Small MD;  Location:  GI       Social History   Substance Use Topics     Smoking status: Former Smoker     Quit date: 1/1/1990     Smokeless tobacco: Never Used      Comment: 20 years ago     Alcohol use No     Family History   Problem Relation Age of Onset     Colon Cancer No family hx of          SH:    Amount of exercise or physical activity: None    Problems taking medications regularly: No    Medication side effects: none  Diet: regular (no restrictions)    Reviewed and updated as needed this visit by clinical staff       Reviewed and updated as needed this visit by Provider         ROS:  Constitutional, HEENT, cardiovascular, pulmonary, GI, , musculoskeletal, neuro, skin, endocrine and psych systems are negative, except as otherwise noted.      ROS:  Constitutional, HEENT, cardiovascular, pulmonary, GI, , musculoskeletal, neuro, skin, endocrine and psych systems are negative, except as otherwise noted.    OBJECTIVE:                                                    /65 (BP Location: Right arm, Cuff Size: Adult Regular)  Pulse 66  Resp 18  SpO2 96%  There is no height or weight on file to  calculate BMI.  GENERAL: pale, frail, elderly and fatigued  EYES: PERRL and left eye glazed.   HENT: normal cephalic/atraumatic  RESP: fine crackles heard at bases bilaterally.   CV: regular rates and rhythm, normal S1 S2, no S3 or S4 and no murmur, click or rub  ABDOMEN: soft, nontender, without hepatosplenomegaly or masses and bowel sounds normal  MS: Right 5th toe swollen, no redness or warmth. Laceration between web of toes.   SKIN: Right LE hardened pink skin.   NEURO: Normal strength and tone  PSYCH: confused    Diagnostic Test Results:  none        ASSESSMENT/PLAN:                                                      1. Acute on chronic diastolic congestive heart failure (H)  Advanced disease. Oxygen dependent. Generalized decline. Hospice enrolled. Called hospice RN to discuss declining condition and worsenenig sleep. Advised to increase Trazodone 75 mg HS. Hospice RN will be out tomorrow to see patient.     2. Closed fracture of phalanx of right fifth toe  Swollen with laceration in web of toe has RN wound care weekly. Was treated with IV ABX during hospitalization for cellulitis. Does not appear infected however is quite swollen today. Wife will monitor symptoms and call clinic if worsens.     3. ACP (advance care planning)  End stage disease. Wife is primary care giver and very concerned about increasing home care services on account of financial burden it will cause. Respite discussed again and wife says patient wants to die at home and therefore she does not want respite. For now they will remain with the day and night PCA services.     4. Insomnia due to medical condition  Worsened. Trazodone increased to 75mg and changed in pill box for tonight. Hospice RN will adjust pill box tomorrow.       [facility]  60 min spent in direct face to face time with this patient and wife in the home, greater than 50% in counseling fall, CHF meds  as stated above and  coordination of care as stated above.